# Patient Record
Sex: FEMALE | Race: BLACK OR AFRICAN AMERICAN
[De-identification: names, ages, dates, MRNs, and addresses within clinical notes are randomized per-mention and may not be internally consistent; named-entity substitution may affect disease eponyms.]

---

## 2019-06-21 ENCOUNTER — HOSPITAL ENCOUNTER (EMERGENCY)
Dept: HOSPITAL 97 - ER | Age: 24
LOS: 1 days | Discharge: HOME | End: 2019-06-22
Payer: SELF-PAY

## 2019-06-21 DIAGNOSIS — H10.9: ICD-10-CM

## 2019-06-21 DIAGNOSIS — J06.9: Primary | ICD-10-CM

## 2019-06-21 DIAGNOSIS — Z88.0: ICD-10-CM

## 2019-06-21 DIAGNOSIS — E86.9: ICD-10-CM

## 2019-06-21 DIAGNOSIS — D64.9: ICD-10-CM

## 2019-06-21 LAB — UA COMPLETE W REFLEX CULTURE PNL UR: (no result)

## 2019-06-21 PROCEDURE — 87804 INFLUENZA ASSAY W/OPTIC: CPT

## 2019-06-21 PROCEDURE — 96372 THER/PROPH/DIAG INJ SC/IM: CPT

## 2019-06-21 PROCEDURE — 81015 MICROSCOPIC EXAM OF URINE: CPT

## 2019-06-21 PROCEDURE — 81003 URINALYSIS AUTO W/O SCOPE: CPT

## 2019-06-21 PROCEDURE — 87086 URINE CULTURE/COLONY COUNT: CPT

## 2019-06-21 PROCEDURE — 81025 URINE PREGNANCY TEST: CPT

## 2019-06-21 PROCEDURE — 96374 THER/PROPH/DIAG INJ IV PUSH: CPT

## 2019-06-21 PROCEDURE — 85025 COMPLETE CBC W/AUTO DIFF WBC: CPT

## 2019-06-21 PROCEDURE — 96361 HYDRATE IV INFUSION ADD-ON: CPT

## 2019-06-21 PROCEDURE — 36415 COLL VENOUS BLD VENIPUNCTURE: CPT

## 2019-06-21 PROCEDURE — 87081 CULTURE SCREEN ONLY: CPT

## 2019-06-21 PROCEDURE — 99283 EMERGENCY DEPT VISIT LOW MDM: CPT

## 2019-06-21 PROCEDURE — 87088 URINE BACTERIA CULTURE: CPT

## 2019-06-21 PROCEDURE — 87070 CULTURE OTHR SPECIMN AEROBIC: CPT

## 2019-06-21 PROCEDURE — 80048 BASIC METABOLIC PNL TOTAL CA: CPT

## 2019-06-21 NOTE — XMS REPORT
Clinical Summary

 Created on:2019



Patient:Jayde Mcduffie

Sex:Female

:1995

External Reference #:[id number]





Demographics







 Address  [address] [place] [address]



   [address] [place] [address]

 

 Home Phone  [phone]

 

 Mobile Phone  [phone]

 

 Home Phone  [phone]

 

 Email Address  [email or web address]

 

 Preferred Language  English

 

 Marital Status  Single

 

 Worship Affiliation  Unknown

 

 Race  Black or 

 

 Ethnic Group  Not  or 









Author







 Organization  [facility]

 

 Address  [address] [place] [address]









Support







 Name  Relationship  Address  Phone

 

 Patt Peng  Unavailable  [address] [place] [address]  [phone]



     [address] [place] [address]  









Care Team Providers







 Name  Role  Phone

 

 Asked, No Pcp  Primary Care Provider  Unavailable









Allergies







 Active Allergy  Reactions  Severity  Noted Date  Comments

 

 Penicillins  Shortness Of Breath  High  2018  







Medications







 Medication  Sig  Dispensed  Refills  Start Date  End Date  Status

 

 acetaminophen-codein  Take 1 tablet by  12 tablet  0  2018  




 e (TYLENOL WITH  mouth every 6          



 CODEINE #3) 300-30  (six) hours as          



 mg per tablet  needed for          



   moderate pain for          



   up to 12 doses.          

 

 acetaminophen-codein  Take 1 tablet by  12 tablet  0  2018  




 e (TYLENOL WITH  mouth every 6          



 CODEINE #3) 300-30  (six) hours as          



 mg per tablet  needed for          



   moderate pain for          



   up to 12 doses.          







Active Problems

Not on file



Encounters







 Date  Type  Specialty  Care Team  Description

 

 2018  Emergency  Emergency Medicine  Venancio Damon,  Sprain of 
medial



       MD  collateral ligament of



         right knee, initial



         encounter (Primary Dx)



after 2018



Social History







 Tobacco Use  Types  Packs/Day  Years Used  Date

 

 Never Smoker        









 Smokeless Tobacco: Never Used      









 Alcohol Use  Drinks/Week  oz/Week  Comments

 

 Yes      Occasionally









 Sex Assigned at Birth  Date Recorded

 

 Not on file  









 Job Start Date  Occupation  Industry

 

 Not on file  Not on file  Not on file









 Travel History  Travel Start  Travel End









 No recent travel history available.







Last Filed Vital Signs







 Vital Sign  Reading  Time Taken

 

 Blood Pressure  125/68  2018 10:13 PM CDT

 

 Pulse  101  2018 10:13 PM CDT

 

 Temperature  37.1 C (98.7 F)  2018 10:13 PM CDT

 

 Respiratory Rate  18  2018 10:13 PM CDT

 

 Oxygen Saturation  99%  2018 10:13 PM CDT

 

 Inhaled Oxygen Concentration  -  -

 

 Weight  -  -

 

 Height  149.9 cm (4' 11")  2018  6:05 PM CDT

 

 Body Mass Index  -  -







Plan of Treatment







 Health Maintenance  Due Date  Last Done  Comments

 

 INFLUENZA VACCINE  2019    







Procedures







 Procedure Name  Priority  Date/Time  Associated  Comments



       Diagnosis  

 

 SPLINT APPLICATION  Routine  2018  9:40    Results for this



     PM CDT    procedure are in



         the results



         section.

 

 XR KNEE 4+ VW RIGHT  STAT  2018  6:51    Results for this



     PM CDT    procedure are in



         the results



         section.

 

 HCG QUALITATIVE,  Routine  2018  6:20    Results for this



 URINE SCREEN    PM CDT    procedure are in



         the results



         section.



after 2018



Results

SPLINT APPLICATION (2018  9:40 PM CDT)





 Narrative  Performed At

 

 Venancio Damon MD 20187:40 PM



  



 Splint Application



  



 Performed by: NEO BAUTISTA



  



 Authorized by: VENANCIO DAMON 



  



  



  



 Consent: 



  



 Consent obtained:Verbal



  



 Consent given by:Patient



  



 Risks discussed:Pain



  



 Alternatives discussed:No treatment



  



 Pre-procedure details: 



  



 Sensation:Normal



  



 Procedure details: 



  



 Laterality:Right



  



 Location:Knee



  



 Knee:R knee



  



 Splint type:Knee immobilizer



  



 Supplies:Prefabricated splint



  



 Post-procedure details: 



  



 Pain:Unchanged



  



 Sensation:Normal



  



 Patient tolerance of procedure:Tolerated well, no immediate 



  



 complications  



XR Knee 4+ Vw Right (2018  6:51 PM CDT)





 Specimen

 

 









 Narrative  Performed At

 

 EXAMINATION:XR KNEE 4VW RIGHT



  [facility]



  



  



 CLINICAL HISTORY:knee pain



  



  



  



 COMPARISON:None.



  



  



  



 FINDINGS:



  



  



  



 The bones appear intact. There is narrowing the medial and  



 patellofemoral compartments. There are no fractures visualized. There is  



 no fluid noted about the joint.



  



  



  



 IMPRESSION:



  



  



  



 There is narrowing the medial and patellofemoral joint compartments no  



 acute finding is visualized.



  



  



  



 [id number]



  



   









 Procedure Note

 

 Hm Interface, Radiology Results Incoming - 2018  7:00 PM CDT



EXAMINATION:  XR KNEE 4  VW RIGHT



 



 CLINICAL HISTORY:  knee pain



 



 COMPARISON:  None.



 



 FINDINGS:



 



 The bones appear intact. There is narrowing the medial and patellofemoral 
compartments. There are no fractures visualized. There is no fluid noted about 
the joint.



 



 IMPRESSION:



 



 There is narrowing the medial and patellofemoral joint compartments no acute 
finding is visualized.



 



 [id number]









 Performing Organization  Address  City/State/Zipcode  Phone Number

 

 NATALI KENNEDY  [address] [place] [address]  



hCG qualitative, urine screen (2018  6:20 PM CDT)





 Component  Value  Ref Range  Performed At  Pathologist



         Signature

 

 hCG qualitative,  NegativeComment:    [facility] DEPARTMENT OF  



 urine  Sensitivity of HCG    PATHOLOGY AND  



   test: 25 mIU/mL    GENOMIC MEDICINE  









 Specimen

 

 Urine









 Performing Organization  Address  City[facility]  Phone Number

 

 [facility] DEPARTMENT OF PATHOLOGY AND  [address] [place] [address]  



 GENOMIC MEDICINE      



after 2018



Insurance







 Payer  Benefit Plan /  Subscriber ID  Effective Dates  Phone  Address  Type



   Group          

 

 CVCP  CVCP BCBS  xxxxxxxxxxxx  2016-Present     [address] [facility], [address]



  



           [place] [address]  









 Guarantor Name  Account Type  Relation to  Date of Birth  Phone  Billing



     Patient      Address

 

 Jayde Mcduffie  Personal/Family  Self  1995  [phone]  [address] [place] [address]



         (Home)  [address] [place] [address] [address] [place] [address]



           [id number]







Advance Directives

Patient has advance care planning documents on file. For more information, 
please contact:[address] [place] [address]

## 2019-06-22 LAB
BUN BLD-MCNC: 11 MG/DL (ref 7–18)
GLUCOSE SERPLBLD-MCNC: 116 MG/DL (ref 74–106)
HCT VFR BLD CALC: 34.2 % (ref 36–45)
LYMPHOCYTES # SPEC AUTO: 1.3 K/UL (ref 0.7–4.9)
PMV BLD: 8.4 FL (ref 7.6–11.3)
POTASSIUM SERPL-SCNC: 3.6 MMOL/L (ref 3.5–5.1)
RBC # BLD: 3.69 M/UL (ref 3.86–4.86)

## 2019-06-22 NOTE — ER
Nurse's Notes                                                                                     

 [facility]                                                                 

Name: Jayde Mcduffie                                                                               

Age: 24 yrs                                                                                       

Sex: Female                                                                                       

: 1995                                                                                   

MRN: [id number]                                                                                   

Arrival Date: 2019                                                                          

Time: 22:22                                                                                       

Account#: [id number]                                                                            

Bed 16                                                                                            

Private MD:                                                                                       

Diagnosis: Acute upper respiratory infection, unspecified;Conjunctivitis;Volume depletion         

                                                                                                  

Presentation:                                                                                     

                                                                                             

22:29 Presenting complaint:  states: "She has had a headache for 3 days as well as     jd3 

      pink eye. today she started having cold sweats and fever off and on as well as a            

      worsening headache and sinus pressure.". Transition of care: patient was not received       

      from another setting of care. Onset of symptoms was 2019. Risk Assessment: Do      

      you want to hurt yourself or someone else? Patient reports no desire to harm self or        

      others. Initial Sepsis Screen: Does the patient meet any 2 criteria? HR > 90 bpm. No.       

      Patient's initial sepsis screen is negative. Does the patient have a suspected source       

      of infection? No. Patient's initial sepsis screen is negative. Care prior to arrival:       

      None.                                                                                       

22:29 Method Of Arrival: Ambulatory                                                           [facility] 

22:29 Acuity: ADRIAN 3                                                                           jd3 

                                                                                                  

Triage Assessment:                                                                                

                                                                                             

01:56 Headache History: The patient has had previous headaches and this one is different than jd3 

      previous episodes, and this one is more severe than previous episodes.                      

01:57 Pain: Pain began gradually, Also complains of no other associated symptoms.             jd3 

                                                                                                  

OB/GYN:                                                                                           

01:58 LMP N/A - Irregular menses                                                              jd3 

                                                                                                  

Historical:                                                                                       

- Allergies:                                                                                      

                                                                                             

22:33 PENICILLINS;                                                                            jd3 

- Home Meds:                                                                                      

22:33 Prednisone Oral [Active]; Ferrex 150 oral oral [Active]; Omeprazole Oral [Active];      jd3 

- PMHx:                                                                                           

22:33 Lupus; Anemia;                                                                          jd3 

- PSHx:                                                                                           

22:33 kidney; elbow; heart;                                                                   jd3 

                                                                                                  

- Immunization history:: Adult Immunizations up to date.                                          

- Social history:: Smoking status: Patient/guardian denies using tobacco.                         

- Ebola Screening: : Patient negative for fever greater than or equal to 101.5 degrees            

  Fahrenheit, and additional compatible Ebola Virus Disease symptoms.                             

                                                                                                  

                                                                                                  

Screenin/22                                                                                             

01:56 Abuse screen: Denies threats or abuse. Nutritional screening: No deficits noted.        jd3 

      Tuberculosis screening: No symptoms or risk factors identified. Fall Risk Ambulatory        

      Aid- None/Bed Rest/Nurse Assist (0 pts). Gait- Normal/Bed Rest/Wheelchair (0 pts)           

      Mental Status- Oriented to own ability (0 pts). Total Porter Fall Scale indicates No         

      Risk (0-24 pts).                                                                            

                                                                                                  

Assessment:                                                                                       

                                                                                             

22:30 General: Appears in no apparent distress. uncomfortable, Behavior is calm, cooperative, jb4 

      appropriate for age. Pain: Complains of pain in left eye, headache, face Pain does not      

      radiate. Pain currently is 8 out of 10 on a pain scale. Quality of pain is described as     

      stabbing, throbbing. Neuro: Level of Consciousness is awake, alert, obeys commands,         

      Oriented to person, place, time, situation. Cardiovascular: Patient's skin is warm and      

      dry. Respiratory: Airway is patent Respiratory effort is even, unlabored, Respiratory       

      pattern is regular, symmetrical. GI: No deficits noted. : No deficits noted. EENT:        

      Sclera/Cornea are reddened in outer aspect of conjuctiva of left eye, iris of left eye      

      and inner aspect of conjunctiva of left eye. Derm: Skin is intact, Skin is pink, warm \T\   

      dry. Musculoskeletal: Circulation, motion, and sensation intact.                            

23:33 Reassessment: Patient appears in no apparent distress at this time. Patient and/or      jb4 

      family updated on plan of care and expected duration. Pain level reassessed. Patient is     

      alert, oriented x 3, equal unlabored respirations, skin warm/dry/pink.                      

                                                                                             

01:00 Reassessment: Patient appears in no apparent distress at this time. Patient and/or      jb4 

      family updated on plan of care and expected duration. Pain level reassessed. Patient is     

      alert, oriented x 3, equal unlabored respirations, skin warm/dry/pink. Patient states       

      feeling better.                                                                             

01:55 Reassessment: Patient appears in no apparent distress at this time. Patient and/or      jd3 

      family updated on plan of care and expected duration. Pain level reassessed. Patient is     

      alert, oriented x 3, equal unlabored respirations, skin warm/dry/pink. Patient states       

      feeling better.                                                                             

                                                                                                  

Vital Signs:                                                                                      

                                                                                             

22:33  / 89; Pulse 113; Resp 17 S; Temp 98.4(O); Pulse Ox 98% on R/A; Weight 65.77 kg   jd3 

      (R); Height 4 ft. 11 in. (149.86 cm) (R); Pain 10/10;                                       

23:33  / 77; Pulse 110; Resp 16; Pulse Ox 99% on R/A;                                   jb4 

                                                                                             

01:00  / 70; Pulse 89; Resp 18; Pulse Ox 100% on R/A;                                   jb4 

                                                                                             

22:33 Body Mass Index 29.29 (65.77 kg, 149.86 cm)                                             j 

                                                                                                  

ED Course:                                                                                        

                                                                                             

22:22 Patient arrived in ED.                                                                  es  

22:23 Humberto Villalobos, RN is Primary Nurse.                                                     jb4 

22:28 Jeannine Madsen FNP-C is PHCP.                                                        snw 

22:28 Inocente Palumbo MD is Attending Physician.                                           snw 

22:31 Triage completed.                                                                       jd3 

22:33 Arm band placed on.                                                                     jd3 

                                                                                             

01:56 Patient has correct armband on for positive identification. Bed in low position. Call   j 

      light in reach. Side rails up X 1. Adult w/ patient.                                        

01:57 No provider procedures requiring assistance completed. IV discontinued, intact,         jd3 

      bleeding controlled, No redness/swelling at site. Pressure dressing applied.                

                                                                                                  

Administered Medications:                                                                         

                                                                                             

23:05 Not Given (other intervention used): Tobramycin Drops (0.3 %) 2 drops Ophthalmic once;  snw 

      OS                                                                                          

23:10 Not Given (patient request/): Norco 5 mg-325 mg 1 tabs PO once                          snw 

23:10 Drug: Phenergan 25 mg Route: PO;                                                        jb4 

                                                                                             

00:10 Follow up: Response: No adverse reaction                                                jd3 

                                                                                             

23:10 Drug: Gentamicin Drops 0.3 % 2 drops Route: Ophthalmic; Site: left eye;                 jb4 

                                                                                             

00:10 Follow up: Response: No adverse reaction                                                jd3 

                                                                                             

23:24 Drug: TORadol 30 mg Route: IM; Site: left gluteus;                                      jb4 

                                                                                             

00:20 Follow up: Response: No adverse reaction                                                jd3 

00:25 Drug: NS 0.9% 1000 ml Route: IV; Rate: 1 bolus; Site: left antecubital;                 jb4 

01:58 Follow up: Response: No adverse reaction; IV Status: Completed infusion; IV Intake:     jd3 

      1000ml                                                                                      

01:06 Drug: Benadryl 12.5 mg Route: IVP; Site: left antecubital;                              jb4 

01:58 Follow up: Response: No adverse reaction                                                jd3 

                                                                                                  

                                                                                                  

Intake:                                                                                           

01:58 IV: 1000ml; Total: 1000ml.                                                              jd3 

                                                                                                  

Outcome:                                                                                          

01:21 Discharge ordered by MD.                                                                delaney 

01:57 Discharged to home ambulatory, with family.                                             jd3 

01:57 Condition: stable                                                                           

01:57 Discharge instructions given to patient, family, Instructed on discharge instructions,      

      follow up and referral plans. medication usage, Demonstrated understanding of               

      instructions, follow-up care, medications, Prescriptions given X 2.                         

02:00 Patient left the ED.                                                                    jd3 

                                                                                                  

Signatures:                                                                                       

Jeannine Madsen, FNP-C                 FNP-CsnJanina Hunt James, RN                       RN   jb4                                                  

Cosme Potter RN                    RN   jd3                                                  

                                                                                                  

**************************************************************************************************

## 2019-06-22 NOTE — EDPHYS
Physician Documentation                                                                           

 [facility]                                                                 

Name: Jayde Mcduffie                                                                               

Age: 24 yrs                                                                                       

Sex: Female                                                                                       

: 1995                                                                                   

MRN: [id number]                                                                                   

Arrival Date: 2019                                                                          

Time: 22:22                                                                                       

Account#: [id number]                                                                            

Bed 16                                                                                            

Private MD:                                                                                       

ED Physician Inocente Palumbo                                                                    

HPI:                                                                                              

                                                                                             

23:06 This 24 yrs old Black Female presents to ER via Ambulatory with complaints of Nasal     snw 

      Congestion, Headache, Difficulty Swallowing.                                                

23:06 The patient or guardian reports cough, flu symptoms, low-grade fever, myalgias, no      snw 

      appetite. Onset: The symptoms/episode began/occurred suddenly, 3 day(s) ago, and became     

      persistent. Severity of symptoms: At their worst the symptoms were moderate. Associated     

      signs and symptoms: Pertinent positives: fever, rhinorrhea, sore throat. It is unknown      

      whether or not the patient has had similar symptoms in the past. It is unknown whether      

      or not the patient has recently seen a physician. taking dayquil/nyquil and using neti      

      pot.                                                                                        

                                                                                                  

OB/GYN:                                                                                           

                                                                                             

01:58 LMP N/A - Irregular menses                                                              jd3 

                                                                                                  

Historical:                                                                                       

- Allergies:                                                                                      

                                                                                             

22:33 PENICILLINS;                                                                            jd3 

- Home Meds:                                                                                      

22:33 Prednisone Oral [Active]; Ferrex 150 oral oral [Active]; Omeprazole Oral [Active];      jd3 

- PMHx:                                                                                           

22:33 Lupus; Anemia;                                                                          jd3 

- PSHx:                                                                                           

22:33 kidney; elbow; heart;                                                                   jd3 

                                                                                                  

- Immunization history:: Adult Immunizations up to date.                                          

- Social history:: Smoking status: Patient/guardian denies using tobacco.                         

- Ebola Screening: : Patient negative for fever greater than or equal to 101.5 degrees            

  Fahrenheit, and additional compatible Ebola Virus Disease symptoms.                             

                                                                                                  

                                                                                                  

ROS:                                                                                              

23:05 ENT: Negative for injury, pain, and discharge, Neck: Negative for injury, pain, and     snw 

      swelling, Cardiovascular: Negative for chest pain, palpitations, and edema,                 

      Respiratory: Negative for shortness of breath, cough, wheezing, and pleuritic chest         

      pain, Abdomen/GI: Negative for abdominal pain, nausea, vomiting, diarrhea, and              

      constipation, Back: Negative for injury and pain, : Negative for injury, bleeding,        

      discharge, and swelling, MS/Extremity: Negative for injury and deformity, Skin:             

      Negative for injury, rash, and discoloration.                                               

23:05 Constitutional: Positive for body aches, fatigue, fever, malaise.                           

23:05 Eyes: Positive for discharge, matting, redness, of the outer aspect of conjuctiva of        

      left eye and inner aspect of conjunctiva of left eye.                                       

23:05 Neuro: Positive for headache, pressure around top of head.                                  

                                                                                                  

Exam:                                                                                             

23:03 Constitutional:  This is a well developed, well nourished patient who is awake, alert,  snw 

      and in no acute distress. Head/Face:  Normocephalic, atraumatic.                            

23:03 ENT:  Nares patent. No nasal discharge, no septal abnormalities noted.  Tympanic            

      membranes are normal and external auditory canals are clear.  Oropharynx with no            

      redness, swelling, or masses, exudates, or evidence of obstruction, uvula midline.          

      Mucous membranes moist. Neck:  Trachea midline, no thyromegaly or masses palpated, and      

      no cervical lymphadenopathy.  Supple, full range of motion without nuchal rigidity, or      

      vertebral point tenderness.  No Meningismus. Chest/axilla:  Normal chest wall               

      appearance and motion.  Nontender with no deformity.  No lesions are appreciated.           

23:03 Respiratory:  Lungs have equal breath sounds bilaterally, clear to auscultation and         

      percussion.  No rales, rhonchi or wheezes noted.  No increased work of breathing, no        

      retractions or nasal flaring. Abdomen/GI:  Soft, non-tender, with normal bowel sounds.      

      No distension or tympany.  No guarding or rebound.  No evidence of tenderness               

      throughout. Back:  No spinal tenderness.  No costovertebral tenderness.  Full range of      

      motion. Skin:  Warm, dry with normal turgor.  Normal color with no rashes, no lesions,      

      and no evidence of cellulitis. MS/ Extremity:  Pulses equal, no cyanosis.                   

      Neurovascular intact.  Full, normal range of motion.                                        

23:03 Eyes: Periorbital structures: appear normal, Pupils: no acute changes, Extraocular          

      movements: no acute changes, Conjunctiva: exudate, in the left eye, injected, in the        

      left eye, Corneas: are normal, Sclera: no appreciated abnormality.                          

23:03 Cardiovascular: Rate: tachycardic, Rhythm: regular.                                         

23:03 Neuro: Orientation: appropriate for stated age, Mentation: is normal, Sensation: is         

      normal, seizure activity, is not displayed by the patient.                                  

                                                                                                  

Vital Signs:                                                                                      

22:33  / 89; Pulse 113; Resp 17 S; Temp 98.4(O); Pulse Ox 98% on R/A; Weight 65.77 kg   jd3 

      (R); Height 4 ft. 11 in. (149.86 cm) (R); Pain 10/10;                                       

23:33  / 77; Pulse 110; Resp 16; Pulse Ox 99% on R/A;                                   jb4 

                                                                                             

01:00  / 70; Pulse 89; Resp 18; Pulse Ox 100% on R/A;                                   jb4 

                                                                                             

22:33 Body Mass Index 29.29 (65.77 kg, 149.86 cm)                                             jd3 

                                                                                                  

MDM:                                                                                              

                                                                                             

22:29 Patient medically screened.                                                             w 

                                                                                             

01:25 Data reviewed: vital signs, nurses notes, lab test result(s). Data interpreted: Pulse   snw 

      oximetry: on room air is 100 %. Interpretation: normal. Counseling: I had a detailed        

      discussion with the patient and/or guardian regarding: the historical points, exam          

      findings, and any diagnostic results supporting the discharge/admit diagnosis, lab          

      results, the need for outpatient follow up, to return to the emergency department if        

      symptoms worsen or persist or if there are any questions or concerns that arise at          

      home. Response to treatment: the patient's symptoms have markedly improved after            

      treatment. Special discussion: Based on the history and exam findings, there is no          

      indication for further emergent testing or inpatient evaluation. I discussed with the       

      patient/guardian the need to see the primary care provider for further evaluation of        

      the symptoms.                                                                               

                                                                                                  

                                                                                             

22:52 Order name: Flu; Complete Time: 23:57                                                   snw 

                                                                                             

22:52 Order name: Strep; Complete Time: 23:57                                                 snw 

                                                                                             

22:52 Order name: Urine Culture                                                               snw 

                                                                                             

22:52 Order name: Urine Microscopic Only; Complete Time: 00:29                                snw 

                                                                                             

23:21 Order name: Urine Dipstick--Ancillary (enter results); Complete Time: 23:57             ar5 

                                                                                             

23:21 Order name: Urine Pregnancy--Ancillary (enter results); Complete Time: 23:57            ar5 

                                                                                             

23:53 Order name: Throat Culture                                                              EDMS

                                                                                             

00:03 Order name: CBC with Diff; Complete Time: 00:44                                         jb4 

                                                                                             

00:03 Order name: Basic Metabolic Panel; Complete Time: 00:44                                 4 

                                                                                             

22:52 Order name: Urine Pregnancy Test (obtain specimen); Complete Time: 23:29                snw 

                                                                                             

22:52 Order name: Urine Dipstick-Ancillary (obtain specimen); Complete Time: 23:29            snw 

                                                                                             

00:03 Order name: IV Saline Lock; Complete Time: 00:27                                        4 

                                                                                             

00:03 Order name: Labs collected and sent; Complete Time: 00:25                               [facility] 

                                                                                                  

Administered Medications:                                                                         

                                                                                             

23:05 Not Given (other intervention used): Tobramycin Drops (0.3 %) 2 drops Ophthalmic once;  snw 

      OS                                                                                          

23:10 Not Given (patient request/): Norco 5 mg-325 mg 1 tabs PO once                          snw 

23:10 Drug: Phenergan 25 mg Route: PO;                                                        [facility] 

                                                                                             

00:10 Follow up: Response: No adverse reaction                                                [facility] 

                                                                                             

23:10 Drug: Gentamicin Drops 0.3 % 2 drops Route: Ophthalmic; Site: left eye;                 [facility] 

                                                                                             

00:10 Follow up: Response: No adverse reaction                                                [facility] 

                                                                                             

23:24 Drug: TORadol 30 mg Route: IM; Site: left gluteus;                                      [facility] 

                                                                                             

00:20 Follow up: Response: No adverse reaction                                                j 

00:25 Drug: NS 0.9% 1000 ml Route: IV; Rate: 1 bolus; Site: left antecubital;                 4 

01:58 Follow up: Response: No adverse reaction; IV Status: Completed infusion; IV Intake:     jd3 

      1000ml                                                                                      

01:06 Drug: Benadryl 12.5 mg Route: IVP; Site: left antecubital;                              4 

01:58 Follow up: Response: No adverse reaction                                                jd3 

                                                                                                  

                                                                                                  

Disposition:                                                                                      

02:59 Co-signature as Attending Physician, Inocente Palumbo MD.                               ma2 

                                                                                                  

Disposition:                                                                                      

19 01:21 Discharged to Home. Impression: Acute upper respiratory infection, unspecified,    

  Conjunctivitis, Volume depletion.                                                               

- Condition is Stable.                                                                            

- Discharge Instructions: Bacterial Conjunctivitis, Upper Respiratory Infection, Adult,           

  Cool Mist Vaporizer, Rehydration, Adult.                                                        

- Prescriptions for Nasonex 50 mcg/actuation Nasal spray,non- aerosol - spray 2 spray             

  by INTRANASAL route once daily; 1 Cartridge. Polytrim 10,000 unit- 1 mg/mL Ophthalmic           

  drops - instill 1 drop by OPHTHALMIC route every 4 hours; 1 bottle.                             

- Work release form, Medication Reconciliation Form, Thank You Letter, Antibiotic                 

  Education, Prescription Opioid Use form.                                                        

- Follow up: Private Physician; When: 2 - 3 days; Reason: Recheck today's complaints,             

  Continuance of care, Re-evaluation by your physician. Follow up: Emergency                      

  Department; When: As needed; Reason: Worsening of condition.                                    

                                                                                                  

                                                                                                  

                                                                                                  

Signatures:                                                                                       

Dispatcher [employer]                           EDMS                                                 

Jeannine Madsen, GRETEL-C                 FNP-Csnw                                                  

Humberto Villalobos, RN                       RN   jb4                                                  

Cosme Potter RN                    RN   jd3                                                  

Inocente Palumbo MD MD   ma2                                                  

                                                                                                  

Corrections: (The following items were deleted from the chart)                                    

02:00 01:21 2019 01:21 Discharged to Home. Impression: Acute upper respiratory          jd3 

      infection, unspecified; Conjunctivitis; Volume depletion. Condition is Stable. Forms        

      are Medication Reconciliation Form, Thank You Letter, Antibiotic Education,                 

      Prescription Opioid Use. Follow up: Private Physician; When: 2 - 3 days; Reason:            

      Recheck today's complaints, Continuance of care, Re-evaluation by your physician.           

      Follow up: Emergency Department; When: As needed; Reason: Worsening of condition. snw       

                                                                                                  

**************************************************************************************************

## 2020-07-20 ENCOUNTER — HOSPITAL ENCOUNTER (INPATIENT)
Dept: HOSPITAL 97 - ER | Age: 25
LOS: 9 days | Discharge: HOME | DRG: 683 | End: 2020-07-29
Attending: FAMILY MEDICINE | Admitting: FAMILY MEDICINE
Payer: SELF-PAY

## 2020-07-20 VITALS — BODY MASS INDEX: 26.9 KG/M2

## 2020-07-20 DIAGNOSIS — E83.51: ICD-10-CM

## 2020-07-20 DIAGNOSIS — E87.2: ICD-10-CM

## 2020-07-20 DIAGNOSIS — N18.6: ICD-10-CM

## 2020-07-20 DIAGNOSIS — Z20.828: ICD-10-CM

## 2020-07-20 DIAGNOSIS — M32.9: ICD-10-CM

## 2020-07-20 DIAGNOSIS — N17.9: Primary | ICD-10-CM

## 2020-07-20 DIAGNOSIS — M32.14: ICD-10-CM

## 2020-07-20 DIAGNOSIS — N25.81: ICD-10-CM

## 2020-07-20 DIAGNOSIS — R41.82: ICD-10-CM

## 2020-07-20 DIAGNOSIS — D63.1: ICD-10-CM

## 2020-07-20 DIAGNOSIS — R05: ICD-10-CM

## 2020-07-20 DIAGNOSIS — Z88.0: ICD-10-CM

## 2020-07-20 DIAGNOSIS — D69.6: ICD-10-CM

## 2020-07-20 DIAGNOSIS — R56.9: ICD-10-CM

## 2020-07-20 DIAGNOSIS — D63.8: ICD-10-CM

## 2020-07-20 DIAGNOSIS — I12.0: ICD-10-CM

## 2020-07-20 DIAGNOSIS — T78.3XXA: ICD-10-CM

## 2020-07-20 DIAGNOSIS — R74.0: ICD-10-CM

## 2020-07-20 DIAGNOSIS — R06.02: ICD-10-CM

## 2020-07-20 LAB
ALBUMIN SERPL BCP-MCNC: 2.7 G/DL (ref 3.4–5)
ALP SERPL-CCNC: 57 U/L (ref 45–117)
ALT SERPL W P-5'-P-CCNC: 11 U/L (ref 12–78)
ANISOCYTOSIS BLD QL: (no result)
AST SERPL W P-5'-P-CCNC: 15 U/L (ref 15–37)
BUN BLD-MCNC: 79 MG/DL (ref 7–18)
GLUCOSE SERPLBLD-MCNC: 99 MG/DL (ref 74–106)
HCT VFR BLD CALC: 9 % (ref 36–45)
INR BLD: 0.97
LYMPHOCYTES # SPEC AUTO: 1.5 K/UL (ref 0.7–4.9)
MAGNESIUM SERPL-MCNC: 1.7 MG/DL (ref 1.8–2.4)
MORPHOLOGY BLD-IMP: (no result)
NT-PROBNP SERPL-MCNC: 8398 PG/ML (ref ?–125)
PMV BLD: 8.2 FL (ref 7.6–11.3)
POLYCHROMASIA BLD QL SMEAR: (no result)
POTASSIUM SERPL-SCNC: 4.3 MMOL/L (ref 3.5–5.1)
RBC # BLD: < 1.05 M/UL (ref 3.86–4.86)
TROPONIN (EMERG DEPT USE ONLY): < 0.02 NG/ML (ref 0–0.04)
TSH SERPL DL<=0.05 MIU/L-ACNC: 2.19 UIU/ML (ref 0.36–3.74)
UA COMPLETE W REFLEX CULTURE PNL UR: (no result)
URATE SERPL-MCNC: 11.4 MG/DL (ref 2.6–6)

## 2020-07-20 PROCEDURE — 70551 MRI BRAIN STEM W/O DYE: CPT

## 2020-07-20 PROCEDURE — 83880 ASSAY OF NATRIURETIC PEPTIDE: CPT

## 2020-07-20 PROCEDURE — 86706 HEP B SURFACE ANTIBODY: CPT

## 2020-07-20 PROCEDURE — 95816 EEG AWAKE AND DROWSY: CPT

## 2020-07-20 PROCEDURE — 86850 RBC ANTIBODY SCREEN: CPT

## 2020-07-20 PROCEDURE — 87081 CULTURE SCREEN ONLY: CPT

## 2020-07-20 PROCEDURE — 81015 MICROSCOPIC EXAM OF URINE: CPT

## 2020-07-20 PROCEDURE — 71045 X-RAY EXAM CHEST 1 VIEW: CPT

## 2020-07-20 PROCEDURE — 93306 TTE W/DOPPLER COMPLETE: CPT

## 2020-07-20 PROCEDURE — 87389 HIV-1 AG W/HIV-1&-2 AB AG IA: CPT

## 2020-07-20 PROCEDURE — 81003 URINALYSIS AUTO W/O SCOPE: CPT

## 2020-07-20 PROCEDURE — 84156 ASSAY OF PROTEIN URINE: CPT

## 2020-07-20 PROCEDURE — 82805 BLOOD GASES W/O2 SATURATION: CPT

## 2020-07-20 PROCEDURE — 86038 ANTINUCLEAR ANTIBODIES: CPT

## 2020-07-20 PROCEDURE — 83605 ASSAY OF LACTIC ACID: CPT

## 2020-07-20 PROCEDURE — 82746 ASSAY OF FOLIC ACID SERUM: CPT

## 2020-07-20 PROCEDURE — 86225 DNA ANTIBODY NATIVE: CPT

## 2020-07-20 PROCEDURE — 86021 WBC ANTIBODY IDENTIFICATION: CPT

## 2020-07-20 PROCEDURE — 94760 N-INVAS EAR/PLS OXIMETRY 1: CPT

## 2020-07-20 PROCEDURE — 30233N1 TRANSFUSION OF NONAUTOLOGOUS RED BLOOD CELLS INTO PERIPHERAL VEIN, PERCUTANEOUS APPROACH: ICD-10-PCS

## 2020-07-20 PROCEDURE — 85610 PROTHROMBIN TIME: CPT

## 2020-07-20 PROCEDURE — 84165 PROTEIN E-PHORESIS SERUM: CPT

## 2020-07-20 PROCEDURE — 83615 LACTATE (LD) (LDH) ENZYME: CPT

## 2020-07-20 PROCEDURE — 84439 ASSAY OF FREE THYROXINE: CPT

## 2020-07-20 PROCEDURE — 86430 RHEUMATOID FACTOR TEST QUAL: CPT

## 2020-07-20 PROCEDURE — 80069 RENAL FUNCTION PANEL: CPT

## 2020-07-20 PROCEDURE — 86900 BLOOD TYPING SEROLOGIC ABO: CPT

## 2020-07-20 PROCEDURE — 82550 ASSAY OF CK (CPK): CPT

## 2020-07-20 PROCEDURE — 83970 ASSAY OF PARATHORMONE: CPT

## 2020-07-20 PROCEDURE — 99285 EMERGENCY DEPT VISIT HI MDM: CPT

## 2020-07-20 PROCEDURE — 86922 COMPATIBILITY TEST ANTIGLOB: CPT

## 2020-07-20 PROCEDURE — 80048 BASIC METABOLIC PNL TOTAL CA: CPT

## 2020-07-20 PROCEDURE — 96360 HYDRATION IV INFUSION INIT: CPT

## 2020-07-20 PROCEDURE — 82728 ASSAY OF FERRITIN: CPT

## 2020-07-20 PROCEDURE — 83735 ASSAY OF MAGNESIUM: CPT

## 2020-07-20 PROCEDURE — 86704 HEP B CORE ANTIBODY TOTAL: CPT

## 2020-07-20 PROCEDURE — 36430 TRANSFUSION BLD/BLD COMPNT: CPT

## 2020-07-20 PROCEDURE — 87070 CULTURE OTHR SPECIMN AEROBIC: CPT

## 2020-07-20 PROCEDURE — 76770 US EXAM ABDO BACK WALL COMP: CPT

## 2020-07-20 PROCEDURE — 82607 VITAMIN B-12: CPT

## 2020-07-20 PROCEDURE — 84550 ASSAY OF BLOOD/URIC ACID: CPT

## 2020-07-20 PROCEDURE — 87340 HEPATITIS B SURFACE AG IA: CPT

## 2020-07-20 PROCEDURE — 84100 ASSAY OF PHOSPHORUS: CPT

## 2020-07-20 PROCEDURE — 85025 COMPLETE CBC W/AUTO DIFF WBC: CPT

## 2020-07-20 PROCEDURE — 84484 ASSAY OF TROPONIN QUANT: CPT

## 2020-07-20 PROCEDURE — 86901 BLOOD TYPING SEROLOGIC RH(D): CPT

## 2020-07-20 PROCEDURE — 70450 CT HEAD/BRAIN W/O DYE: CPT

## 2020-07-20 PROCEDURE — 83520 IMMUNOASSAY QUANT NOS NONAB: CPT

## 2020-07-20 PROCEDURE — 83540 ASSAY OF IRON: CPT

## 2020-07-20 PROCEDURE — 86870 RBC ANTIBODY IDENTIFICATION: CPT

## 2020-07-20 PROCEDURE — 82652 VIT D 1 25-DIHYDROXY: CPT

## 2020-07-20 PROCEDURE — 36415 COLL VENOUS BLD VENIPUNCTURE: CPT

## 2020-07-20 PROCEDURE — 87086 URINE CULTURE/COLONY COUNT: CPT

## 2020-07-20 PROCEDURE — 82570 ASSAY OF URINE CREATININE: CPT

## 2020-07-20 PROCEDURE — 82274 ASSAY TEST FOR BLOOD FECAL: CPT

## 2020-07-20 PROCEDURE — 93005 ELECTROCARDIOGRAM TRACING: CPT

## 2020-07-20 PROCEDURE — 76000 FLUOROSCOPY <1 HR PHYS/QHP: CPT

## 2020-07-20 PROCEDURE — 80053 COMPREHEN METABOLIC PANEL: CPT

## 2020-07-20 PROCEDURE — 87088 URINE BACTERIA CULTURE: CPT

## 2020-07-20 PROCEDURE — 86160 COMPLEMENT ANTIGEN: CPT

## 2020-07-20 PROCEDURE — 80076 HEPATIC FUNCTION PANEL: CPT

## 2020-07-20 PROCEDURE — 85044 MANUAL RETICULOCYTE COUNT: CPT

## 2020-07-20 PROCEDURE — 96361 HYDRATE IV INFUSION ADD-ON: CPT

## 2020-07-20 PROCEDURE — 84443 ASSAY THYROID STIM HORMONE: CPT

## 2020-07-20 PROCEDURE — 84466 ASSAY OF TRANSFERRIN: CPT

## 2020-07-20 PROCEDURE — 81025 URINE PREGNANCY TEST: CPT

## 2020-07-20 PROCEDURE — 90935 HEMODIALYSIS ONE EVALUATION: CPT

## 2020-07-20 PROCEDURE — 87522 HEPATITIS C REVRS TRNSCRPJ: CPT

## 2020-07-20 PROCEDURE — 83010 ASSAY OF HAPTOGLOBIN QUANT: CPT

## 2020-07-20 PROCEDURE — 86317 IMMUNOASSAY INFECTIOUS AGENT: CPT

## 2020-07-20 RX ADMIN — HEPARIN SODIUM SCH UNIT: 5000 INJECTION, SOLUTION INTRAVENOUS; SUBCUTANEOUS at 18:43

## 2020-07-20 RX ADMIN — Medication SCH ML: at 21:48

## 2020-07-20 RX ADMIN — SODIUM CHLORIDE SCH: 0.9 INJECTION, SOLUTION INTRAVENOUS at 16:00

## 2020-07-20 NOTE — XMS REPORT
Clinical Summary

                            Created on:2020



Patient:Jayde Mcduffie

Sex:Female

:1995

External Reference #:[id number]





Demographics







                          Address                   [address] 



                                                    [place] [address]

 

                          Home Phone                [phone]

 

                          Work Phone                [phone]

 

                          Mobile Phone              [phone]

 

                          Preferred Language        ENG

 

                          Marital Status            Single

 

                          Islam Affiliation     Unknown

 

                          Race                      Unknown

 

                          Ethnic Group              Unknown









Author







                          Organization              [facility]

ict

 

                          Address                   [facility] [place] [address]









Support







                Name            Relationship    Address         Phone

 

                Patt Peng    Unavailable     [address] [facility] [address]  [phone]



                                                [place] [address] 









Care Team Providers







                    Name                Role                Phone

 

                    Unavailable         Primary Care Provider Unavailable









Allergies







             Active Allergy Reactions    Severity     Noted Date   Comments

 

             Amoxicillin-Pot Clavulanate                           2009   

 

             Penicillins                            2009   







Medications







          Medication Sig       Dispensed Refills   Start Date End Date  Status

 

          hydroxychloroquine Take 200 mg by           0                         

    Active



          (PLAQUENIL) 200 mg tablet mouth daily.                                

         

 

          ferrous sulfate 325 mg Take 325 mg by           0                     

        Active



          (65 mg iron) tablet mouth daily                                       

  



                    (with                                             



                    breakfast).                                         

 

          aspirin (ASPIRIN) 81 mg Chew and            0                         

    Active



          chewable tablet swallow 81 mg                                         



                    by mouth                                          



                    daily.                                            

 

          sertraline (ZOLOFT) 50 mg Take 50 mg by           0                   

          Active



          tablet    mouth daily.                                         

 

          atovaquone (MEPRON) 750 Take 10 mL by 210 mL    0         10/08/2017  

         Active



          mg/5 mL oral suspension mouth daily.                                  

       

 

          folic acid (FOLVITE) 1 mg Take 1 tablet 90 tablet 3         10/08/2017

           Active



          tablet    by mouth                                          



                    daily.                                            

 

          sennosides-docusate Take 1 tablet 30 tablet 3         10/08/2017      

     Active



          sodium (SENNA PLUS) by mouth                                          



          8.6-50 mg tablet daily.                                            

 

          hydroxychloroquine Take 1 tablet 30 tablet 3         10/08/2017       

    Active



          (PLAQUENIL) 200 mg tablet by mouth                                    

      



                    daily.                                            

 

          omeprazole (PRILOSEC) 20 Take 2    60 capsule 3         10/08/2017    

       Active



          mg delayed release capsules by                                        

 



          capsule   mouth daily.                                         

 

          mycophenolate (CELLCEPT) Take 3 tablets 90 tablet 3         10/08/2017

           Active



          500 mg tabletIndications: by mouth 2                                  

       



          Acquired hemolytic times daily.                                       

  



          anemia, Systemic lupus                                                

   



          erythematosus with                                                   



          glomerular disease,                                                   



          unspecified SLE type                                                  

 

 

                          predniSONE (DELTASONE) 20 Take 3 tablets by mouth alma

y Take 60mg (3 tablets) 

daily for 4 weeks 90 tablet    2            10/08/2017                Active



          mg tablet Take 50mg (2.5 tablets) daily for 2 weeks                   

                      



                    Then take 40mg (2 tablets) daily until seen in clinic.      

                                   

 

          ondansetron (ZOFRAN) 4 mg Take 1 tablet 20 tablet 0         10/08/2017

           Active



          tablet    by mouth every                                         



                    8 hours as                                         



                    needed for up                                         



                    to 5 doses for                                         



                    Nausea.                                           







Active Problems







                          Problem                   Noted Date

 

                          Thyroid lesion            10/01/2017

 

                          Lymphadenopathy, cervical 2017

 

                          Anemia                    2017

 

                          Systemic lupus erythematosus with glomerular disease 0

2017

 

                          Cold agglutinin disease   2017

 

                          Menorrhagia with regular cycle 

 

                          Uterine leiomyoma         

 

                          Acquired hemolytic anemia 

 

                          Current use of steroid medication 

 

                          High risk medication use  

 

                          Lupus nephritis           

 

                          Proteinuria               







Family History







                Medical History Relation        Name            Comments

 

                Heart failure   Father                          

 

                Diabetes        Maternal Grandfather                 

 

                Heart failure   Maternal Grandfather                 

 

                Stomach cancer  Maternal Grandmother                 

 

                Cerebral aneurysm Mother                          

 

                Heart failure   Mother                          

 

                Hyperthyroidism Mother                          









                Relation        Name            Status          Comments

 

                Father                                          

 

                Maternal Grandfather                                 

 

                Maternal Grandmother                                 

 

                Mother                                          







Social History







             Tobacco Use  Types        Packs/Day    Years Used   Date

 

             Never Smoker                                        









                Smokeless Tobacco: Never Used                                 









                Alcohol Use     Drinks/Week     oz/Week         Comments

 

                No                                              









                          Sex Assigned at Birth     Date Recorded

 

                          Not on file               









                    Job Start Date      Occupation          Industry

 

                    Not on file         Not on file         Not on file









                    Travel History      Travel Start        Travel End









                                        No recent travel history available.







Last Filed Vital Signs

Not on file



Plan of Treatment







                Health Maintenance Due Date        Last Done       Comments

 

                Cervical Cancer Scrn (3 Yrs) 2015                      

 

                IMM Influenza Seasonal Oct to March (>/= 19 yrs) 10/01/2020     

                 







Results

Not on fileafter 2019



Insurance







          Payer     Benefit Plan / Subscriber ID Effective Dates Phone     Addre

ss   Type



                    Group                                             

 

          [facility]      SELF-PAY  xxxxxxxxx 2017-Prese [phone] [id number] 



           SELF-PAY   UNSCREENED            [address]          [place], [place] 



                                                            [id number]     









           Guarantor Name Account Type Relation to Date of    Phone      Billing



                                 Patient    Birth                 Address

 

           Jayde Mcduffie Personal/Family Self       1995 [phone] [address]









             [address]                                             (Home)



                                        [place], [place]



                                                       [phone]



                                                       (Work)     







Advance Directives







                Code Status     Date Activated  Date Inactivated Comments

 

                Full Code       2017 12:28 PM 10/8/2017  8:31 PM

## 2020-07-20 NOTE — P.HP
Certification for Inpatient


With expected LOS: >2 Midnights


Patient will require the following post-hospital care: None


Practitioner: I am a practitioner with admitting privileges, knowledge of 

patient current condition, hospital course, and medical plan of care.


Services: Services provided to patient in accordance with Admission requirements

found in Title 42 Section 412.3 of the Code of Federal Regulations





<Patrick Stiles - Last Filed: 07/20/20 15:47>





Patient History


Date of Service: 07/20/20


Primary Care Provider: [facility]


Reason for admission: Acute renal failure/lupus/anemia


History of Present Illness: 





25-year-old  female with past medical history of lupus, anemia 

that presents to the emergency room complaining of shortness of breath, sore 

throat, low-grade fever that started approximately a month ago and has 

progressively worsened.  Patient states that she also has been having an inter

mittent cough.  Patient denies any contact with a known Covid positive person.  

Patient states that in 2008 she was diagnosed with lupus.  At that time she was 

also told she had congestive heart failure and underwent a pericardial window.  

She was seeing a rheumatologist in [facility] but moved to [place] several 

years ago and has not followed up with anyone.  States that she has been on 

prednisone 10 mg daily for the last 10-12 years.  She also had a kidney biopsy-

date unknown. 





In the emergency room patient is found to be in acute renal failure and with 

severe anemia with a creatinine level of 9.55, BUN of 79, an H&H of 3.3/9.  Her 

proBNP is 8398.  She is not requiring O2 support at this time.  She is afebrile 

with a temperature reading of 98.6 on arrival to ED.  Vitals were also stable 

with a blood pressure of 117/68, a pulse of 103, respiratory rate of 18 and 

oxygen saturations of 100% on room air.  Her pregnancy test is negative.  Urine 

is negative.





On physical exam patient is calm.  She is in no distress. Is not having cough, 

does not feel febrile and is doing well after some IV fluids and initial 

treatment in the ED.  ED will initiate 2 units of PRBC blood transfusion.  P

atient will be admitted to hospital and further evaluated.


Home medications list reviewed: No





- Past Medical/Surgical History


Diabetic: No


-: Lupus


-: Acute renal failure


-: Congestive heart failure status post pericardial window in 2008


-: Anemia


-: Right kidney biopsy


-: Pericardial window


Psychosocial/ Personal History: .  Lives at home with .  No 

children





- Family History


  ** Mother


-: Heart disease, Kidney disease





- Social History


Smoking Status: Never smoker


Alcohol use: No


CD- Drugs: No


Caffeine use: No


Place of Residence: Home





<RoberthmaximomayurPatrick sapp - Last Filed: 07/20/20 15:47>


Date of Service: 07/20/20





<YiimFreddie Malachi - Last Filed: 07/20/20 16:44>





Review of Systems


General: Fever, Malaise, As per HPI


Eyes: Vision Change (Complaining of decreased visual acuity after watching TV or

computer screen for a few hr.)


ENT: Unremarkable


Respiratory: Cough, Shortness of Breath, SOB with Excertion


Cardiovascular: Unremarkable


Gastrointestinal: Nausea, Vomiting


Genitourinary: Unremarkable


Musculoskeletal: Unremarkable


Integumentary: Unremarkable


Neurological: Weakness





<RoberthmaximoPatrick vera - Last Filed: 07/20/20 15:47>





Physical Examination





- Vital Signs


Temperature: 98.6 F


Blood Pressure: 103/67


Pulse: 100


Respirations: 18


Pulse Ox (%): 98 (RA)





- Physical Exam


General: Alert, In no apparent distress, Oriented x3


HEENT: Atraumatic, Normocephalic, PERRLA


Neck: Supple, No Thyromegaly, Other (Trachea midline)


Respiratory: Clear to auscultation bilaterally, Normal air movement


Cardiovascular: No edema, Normal pulses, Normal S1 S2


Capillary refill: <2 Seconds


Gastrointestinal: Normal bowel sounds, Soft and benign, Non-distended


Musculoskeletal: No clubbing, No swelling, No contractures


Integumentary: No rashes, No breakdown, No significant lesion, No 

tenderness/swelling


Neurological: Normal gait, Normal speech, Normal strength at 5/5 x4 extr, Normal

tone


Other Physical/Emotional Findings: Pleasant and cooperative





- Studies


Laboratory Data (last 24 hrs)





07/20/20 11:57: PT 11.5, INR 0.97


07/20/20 11:57: WBC 3.9 L, Hgb 3.3 L*, Hct 9.0 L*, Plt Count 125 L


07/20/20 11:57: Sodium 141, Potassium 4.3, BUN 79 H, Creatinine 9.55 H*, Glucose

99, Magnesium 1.7 L, Total Bilirubin 1.3 H, AST 15, ALT 11 L, Alkaline 

Phosphatase 57





Microbiology Data (last 24 hrs): 








07/20/20 12:02   Throat   Group A Streptococcus Rapid Screen - Final








<Patrick Stiles - Last Filed: 07/20/20 15:47>





- Studies


Laboratory Data (last 24 hrs)





07/20/20 11:57: PT 11.5, INR 0.97


07/20/20 11:57: WBC 3.9 L, Hgb 3.3 L*, Hct 9.0 L*, Plt Count 125 L


07/20/20 11:57: Sodium 141, Potassium 4.3, BUN 79 H, Creatinine 9.55 H*, Glucose

99, Magnesium 1.7 L, Total Bilirubin 1.3 H, AST 15, ALT 11 L, Alkaline 

Phosphatase 57





Microbiology Data (last 24 hrs): 








07/20/20 12:02   Throat   Group A Streptococcus Rapid Screen - Final








<Freddie Geller - Last Filed: 07/20/20 16:44>





Assessment and Plan





- Plan





Impression:


Acute kidney failure complicated by a history of lupus, chronic anemia and 

possible nephritis:


History of lupus:


Severe Anemia of chronic disease:


Congestive heart failure status post pericardial window in 2008:





Plan:


Acute kidney failure complicated by a history of lupus, chronic anemia and 

possible nephritis:  Patient noted to have a significantly elevated creatinine 

level of 9.5 on admission.  This is likely secondary to a history of lupus, 

chronic anemia and nephritis.  Patient has not seen a rheumatologist and few 

years.  States she has been taking prednisone 10 mg daily dose for greater than 

10 years.  Prescription given by PCP at the [facility].  UA showing 3+ 

protein and 1+ blood consistent with nephritis.  Patient already started on IV 

fluids.  Will consult Nephrology Dr. Longoria.  Will monitor daily labs and 

await recommendations.


History of lupus:  The patient was diagnosed with lupus in 2008. She was seeing 

a rheumatologist in [facility] but moved to [facility] and has not seen a 

rheumatologist in many years.  Patient will likely benefit following up with the

rheumatologist on discharge.


Severe Anemia of chronic disease:  Patient noted to have the H&H of 3.3/9.0 on 

admission.  ER started patient on 2 units PRBCs.  Will continue monitoring H&H. 

Patient denies GI bleeding and there is no indication that the patient is 

bleeding.  Admits to a long history of chronic anemia secondary to her lupus. 


Congestive heart failure status post pericardial window in 2008:  Patient states

that she was told she had congestive heart failure in 2008 when she was 

diagnosed with lupus.  States that at that time she was noted to have a 

pericardial effusion and underwent pericardial window which resolve her issue.  

On admission it is noted that her BNP is elevated at 8398.  Will order 

echocardiogram for further evaluation.  Will consider cardiology consultation 

based on findings of echocardiogram.  Will place patient on telemetry and 

monitor. 





Discharge Plan: Home


Plan to discharge in: 72 Hours





- Advance Directives


Does patient have a Living Will: No


Does patient have a Durable POA for Healthcare: No





- Code Status/Comfort Care


Code Status Assessed: Yes


Time Spent Managing Pts Care (In Minutes): 55





<Patrick Stiles - Last Filed: 07/20/20 15:47>

## 2020-07-20 NOTE — EDPHYS
Physician Documentation                                                                           

 [facility] [place]                                                                 

Name: Jayde Mcduffie                                                                               

Age: 25 yrs                                                                                       

Sex: Female                                                                                       

: 1995                                                                                   

MRN: [id number]                                                                                   

Arrival Date: 2020                                                                          

Time: 09:45                                                                                       

Account#: [id number]                                                                            

Bed 13                                                                                            

Private MD:                                                                                       

ED Physician Camron Paris                                                                         

HPI:                                                                                              

                                                                                             

12:40 This 25 yrs old Black Female presents to ER via Ambulatory with complaints of Shortness snw 

      Of Breath, Eye Pain, Sore Throat.                                                           

12:40 The patient has shortness of breath at rest. Onset: The symptoms/episode began/occurred snw 

      1 month(s) ago, and became worse 1 week(s) ago, and became persistent. Duration: The        

      symptoms are continuous, and are steadily getting worse. Associated signs and symptoms:     

      Pertinent positives: palpitations. Severity of symptoms: At their worst the symptoms        

      were moderate in the emergency department the symptoms are unchanged. It is unknown         

      whether or not the patient has had similar symptoms in the past. The patient has been       

      recently seen by a physician: with similar presenting complaints. pt has Lupus and          

      thought she was having s/s of that. She does not see an endo and has been taking 10mg       

      prednisone daily for 10+ years.                                                             

                                                                                                  

OB/GYN:                                                                                           

10:05 LMP 2020                                                                           em  

                                                                                                  

Historical:                                                                                       

- Allergies:                                                                                      

10:05 PENICILLINS;                                                                            em  

- PMHx:                                                                                           

10:05 Lupus; Anemia;                                                                          em  

- PSHx:                                                                                           

10:05 elbow;                                                                                  em  

10:05 kidney biopsy;                                                                          em  

                                                                                                  

- Immunization history:: Adult Immunizations up to date.                                          

- Social history:: Smoking status: Patient denies any tobacco usage or history of.                

                                                                                                  

                                                                                                  

ROS:                                                                                              

12:44 Neck: Negative for injury, pain, and swelling.                                          snw 

12:44 Abdomen/GI: Negative for abdominal pain, nausea, vomiting, diarrhea, and constipation,      

      Back: Negative for injury and pain, : Negative for injury, bleeding, discharge, and       

      swelling, MS/Extremity: Negative for injury and deformity, Skin: Negative for injury,       

      rash, and discoloration, Neuro: Negative for headache, weakness, numbness, tingling,        

      and seizure.                                                                                

12:44 Constitutional: Positive for body aches, fatigue, malaise.                                  

12:44 Eyes: Positive for pain.                                                                    

12:44 ENT: Positive for sore throat.                                                              

12:44 Cardiovascular: Positive for palpitations.                                                  

12:44 Respiratory: Positive for dyspnea on exertion, shortness of breath.                         

                                                                                                  

Exam:                                                                                             

12:39 Head/Face:  Normocephalic, atraumatic.                                                  snw 

12:39 ENT:  Nares patent. No nasal discharge, no septal abnormalities noted.  Tympanic            

      membranes are normal and external auditory canals are clear.  Oropharynx with no            

      redness, swelling, or masses, exudates, or evidence of obstruction, uvula midline.          

      Mucous membranes moist. Neck:  Trachea midline, no thyromegaly or masses palpated, and      

      no cervical lymphadenopathy.  Supple, full range of motion without nuchal rigidity, or      

      vertebral point tenderness.  No Meningismus. Chest/axilla:  Normal chest wall               

      appearance and motion.  Nontender with no deformity.  No lesions are appreciated.           

12:39 Abdomen/GI:  Soft, non-tender, with normal bowel sounds.  No distension or tympany.  No     

      guarding or rebound.  No evidence of tenderness throughout. Back:  No spinal                

      tenderness.  No costovertebral tenderness.  Full range of motion. Skin:  Warm, dry with     

      normal turgor.  Normal color with no rashes, no lesions, and no evidence of cellulitis.     

      MS/ Extremity:  Pulses equal, no cyanosis.  Neurovascular intact.  Full, normal range       

      of motion. Neuro:  Awake and alert, GCS 15, oriented to person, place, time, and            

      situation.  Cranial nerves II-XII grossly intact.  Motor strength 5/5 in all                

      extremities.  Sensory grossly intact.  Cerebellar exam normal.  Normal gait. Psych:         

      Awake, alert, with orientation to person, place and time.  Behavior, mood, and affect       

      are within normal limits.                                                                   

12:39 Constitutional: The patient appears alert, awake, pale.                                     

12:39 Eyes: Conjunctiva: pale, bilaterally.                                                       

12:39 Cardiovascular: Rate: tachycardic, Rhythm: regular.                                         

12:39 Respiratory: the patient does not display signs of respiratory distress,  Respirations:     

      shallow respirations, tachypnea, Breath sounds: are clear throughout.                       

                                                                                                  

Vital Signs:                                                                                      

10:01  / 68; Pulse 103; Resp 18; Temp 98.6; Pulse Ox 100% on R/A; Weight 60.33 kg;      em  

      Height 4 ft. 11 in. (149.86 cm); Pain 0/10;                                                 

12:20  / 71; Pulse 111; Resp 20 S; Pulse Ox 100% on R/A;                                ca1 

13:10  / 67; Pulse 100; Resp 18 S; Pulse Ox 98% on R/A;                                 ca1 

13:59  / 64; Pulse 91; Resp 18 S; Pulse Ox 100% on R/A;                                 ca1 

15:14  / 60; Pulse 103; Resp 18 S; Pulse Ox 100% on R/A;                                ca1 

16:00 BP 94 / 55; Pulse 99; Resp 18 S; Pulse Ox 100% on R/A;                                  ca1 

16:29  / 55; Pulse 100; Resp 19 S; Pulse Ox 100% on R/A;                                ca1 

10:01 Body Mass Index 26.86 (60.33 kg, 149.86 cm)                                             em  

                                                                                                  

MDM:                                                                                              

11:28 Patient medically screened.                                                             snw 

13:39 Data reviewed: vital signs, nurses notes. Data interpreted: Pulse oximetry: on room air snw 

      is 98 %. Interpretation: normal. Counseling: I had a detailed discussion with the           

      patient and/or guardian regarding: the historical points, exam findings, and any            

      diagnostic results supporting the discharge/admit diagnosis, lab results, the need for      

      further work-up and treatment in the hospital. Physician consultation: Patrick KO[facility] was called at 13:41, was contacted at 13:41, regarding admission, to the telemetry       

      unit. would like consultation with Dr. Longoria.                                            

                                                                                                  

                                                                                             

11:27 Order name: Basic Metabolic Panel; Complete Time: 13:18                                 snw 

                                                                                             

11:27 Order name: CBC with Diff; Complete Time: 13:58                                         snw 

                                                                                             

11:27 Order name: LFT's; Complete Time: 13:18                                                 snw 

                                                                                             

11:27 Order name: Magnesium; Complete Time: 13:18                                             snw 

                                                                                             

11:27 Order name: NT PRO-BNP; Complete Time: 13:18                                            snw 

                                                                                             

11:27 Order name: PT-INR; Complete Time: 12:32                                                snw 

                                                                                             

11:27 Order name: Troponin (emerg Dept Use Only); Complete Time: 13:18                        snw 

                                                                                             

11:27 Order name: TSH; Complete Time: 13:18                                                   snw 

                                                                                             

11:27 Order name: TS                                                                          snw 

                                                                                             

11:27 Order name: Strep; Complete Time: 12:50                                                 snw 

                                                                                             

11:27 Order name: Urine Culture                                                               snw 

                                                                                             

11:27 Order name: Urine Microscopic Only; Complete Time: 12:14                                [facility] 

                                                                                             

11:28 Order name: Lactate; Complete Time: 12:32                                               [facility] 

                                                                                             

11:36 Order name: Urine Dipstick--Ancillary (enter results); Complete Time: 12:14             mt  

                                                                                             

11:27 Order name: XRAY Chest (1 view); Complete Time: 12:50                                   [facility] 

                                                                                             

11:27 Order name: EKG; Complete Time: 11:29                                                   [facility] 

                                                                                             

11:27 Order name: Cardiac monitoring; Complete Time: 12:08                                    [facility] 

                                                                                             

11:36 Order name: Urine Pregnancy--Ancillary (enter results); Complete Time: 12:14            mt  

                                                                                             

12:47 Order name: Bb Add On                                                                   [facility] 

                                                                                             

12:49 Order name: Throat Culture                                                              [facility]

                                                                                             

13:16 Order name: Packed RBC Leukored                                                         [facility]

                                                                                             

13:43 Order name: Manual Differential; Complete Time: 13:58                                   [facility]

                                                                                             

15:29 Order name: CONS Physician Consult                                                      [facility]

                                                                                             

16:29 Order name: NT PRO-BNP; Complete Time: 16:31                                            [facility]

                                                                                             

16:38 Order name: T4 Free; Complete Time: 16:45                                               [facility]

                                                                                             

11:27 Order name: EKG - Nurse/Tech; Complete Time: 12:08                                      [facility] 

                                                                                             

11:27 Order name: IV Saline Lock; Complete Time: 12:09                                        [facility] 

                                                                                             

11:27 Order name: Labs collected and sent; Complete Time: 12:09                               [facility] 

                                                                                             

11:27 Order name: O2 Per Protocol; Complete Time: 12:09                                       [facility] 

                                                                                             

11:27 Order name: O2 Sat Monitoring; Complete Time: 12:09                                     [facility] 

                                                                                             

11:27 Order name: Urine Pregnancy Test (obtain specimen); Complete Time: 11:34                [facility] 

                                                                                             

11:27 Order name: Urine Dipstick-Ancillary (obtain specimen); Complete Time: 11:34            [facility] 

                                                                                             

14:42 Order name: Urine Pregnancy Test (obtain specimen); Complete Time: 15:03                snw 

                                                                                                  

Administered Medications:                                                                         

      Discontinued: NS 0.9% 1000 ml IV at 125 ml/hr continuous                                    

11:50 Drug: NS 0.9% 1000 ml Route: IV; Rate: 125 ml/hr; Site: right antecubital;              ca1 

14:00 Follow up: Response: No adverse reaction; IV Status: Infusion continued upon admission  ca1 

14:30 Drug: D5W with Sodium Bicarbonate 100 mEq/L 1000 ml Route: IV; Rate: 125 ml/hr; Site:   ca1 

      right antecubital;                                                                          

15:15 Follow up: Response: Adverse reaction, Physician notified; IV Status: Infusion          ca1 

      continued upon admission                                                                    

                                                                                                  

                                                                                                  

Disposition:                                                                                      

18:26 Co-signature as Attending Physician, Camron Paris MD.                                    rn  

                                                                                                  

Disposition:                                                                                      

20 13:43 Hospitalization ordered by Patrick Stiles for Inpatient Admission.              

  Preliminary diagnosis are Acute renal failure, Lupus erythematosus, Anemia in                   

  other chronic diseases classified elsewhere.                                                    

- Bed requested for Telemetry/MedSurg (Inpatient).                                                

- Status is Inpatient Admission.                                                              ca1 

- Condition is Stable.                                                                            

- Problem is an acute exacerbation.                                                               

- Symptoms have worsened.                                                                         

                                                                                                  

                                                                                                  

                                                                                                  

Signatures:                                                                                       

Dispatcher [employer]                           EDMS                                                 

Kristi Yarbrough Shelly, FNP-C                   FNP-Csnw                                                  

Simone Calles, RN                        RN   em                                                   

Camron Paris MD MD   rn                                                   

AcobNiki RN                        RN   ca1                                                  

                                                                                                  

Corrections: (The following items were deleted from the chart)                                    

13:16 12:48 PACKED RBC LEUKORED [id number]-[id number] ordered. EDMS                                 EDMS

13:16 12:48 ABO/RH typing ordered. EDMS                                                       EDMS

13:16 12:48 Antibody Screen ordered. EDMS                                                     EDMS

16:00 13:43 Hospitalization Ordered by Patrick KO[facility] for Inpatient Admission.           bd  

      Preliminary diagnosis is Acute renal failure; Lupus erythematosus; Anemia in other          

      chronic diseases classified elsewhere. Bed requested for Telemetry/MedSurg (Inpatient).     

      Status is Inpatient Admission. Condition is Stable. Problem is an acute exacerbation.       

      Symptoms have worsened. snw                                                                 

17:36 16:00 2020 13:43 Hospitalization Ordered by Patrick KO[facility] for Inpatient     ca1 

      Admission. Preliminary diagnosis is Acute renal failure; Lupus erythematosus; Anemia in     

      other chronic diseases classified elsewhere. Bed requested for Telemetry/MedSurg            

      (Inpatient). Status is Inpatient Admission. Condition is Stable. Problem is an acute        

      exacerbation. Symptoms have worsened. bd                                                    

                                                                                                  

**************************************************************************************************

## 2020-07-20 NOTE — ER
Nurse's Notes                                                                                     

 [facility]                                                                 

Name: Jayde Mcduffie                                                                               

Age: 25 yrs                                                                                       

Sex: Female                                                                                       

: 1995                                                                                   

MRN: [id number]                                                                                   

Arrival Date: 2020                                                                          

Time: 09:45                                                                                       

Account#: [id number]                                                                            

Bed 13                                                                                            

Private MD:                                                                                       

Diagnosis: Acute renal failure;Lupus erythematosus;Anemia in other chronic diseases classified    

  elsewhere                                                                                       

                                                                                                  

Presentation:                                                                                     

                                                                                             

10:01 Chief complaint: Patient states: shortness of breath, sore throat and yvonne. eye pain     em  

      that started 1 month ago, reports low grade fever, +N -V-D, right sided flank pain.         

      Coronavirus screen: Patient reports a cough. Patient reports shortness of breath or         

      difficulty breathing. Patient reports a measured and/or subjective temperature greater      

      than 100.4F. Patient denies travel on a cruise ship or to a country the [facility] currently       

      lists as an affected area. Patient denies contact with known and/or suspected case of       

      COVID-19. Ebola Screen: Patient negative for fever greater than or equal to 101.5           

      degrees Fahrenheit, and additional compatible Ebola Virus Disease symptoms Patient          

      denies exposure to infectious person. Patient denies travel to an Ebola-affected area       

      in the 21 days before illness onset. No symptoms or risks identified at this time.          

      Initial Sepsis Screen: Does the patient meet any 2 criteria? HR > 90 bpm. No. Patient's     

      initial sepsis screen is negative. Does the patient have a suspected source of              

      infection? No. Patient's initial sepsis screen is negative. Risk Assessment: Do you         

      want to hurt yourself or someone else? Patient reports no desire to harm self or            

      others. Onset of symptoms was 2020.                                                

10:01 Method Of Arrival: Ambulatory                                                           em  

10:01 Acuity: ADRIAN 3                                                                           em  

                                                                                                  

OB/GYN:                                                                                           

10:05 LMP 2020                                                                           em  

                                                                                                  

Historical:                                                                                       

- Allergies:                                                                                      

10:05 PENICILLINS;                                                                            em  

- PMHx:                                                                                           

10:05 Lupus; Anemia;                                                                          em  

- PSHx:                                                                                           

10:05 elbow;                                                                                  em  

10:05 kidney biopsy;                                                                          em  

                                                                                                  

- Immunization history:: Adult Immunizations up to date.                                          

- Social history:: Smoking status: Patient denies any tobacco usage or history of.                

                                                                                                  

                                                                                                  

Screenin:30 Abuse screen: Denies threats or abuse. Denies injuries from another. Nutritional        ca1 

      screening: No deficits noted. Tuberculosis screening: No symptoms or risk factors           

      identified. Fall Risk IV access (20 points).                                                

                                                                                                  

Assessment:                                                                                       

11:30 General: Appears in no apparent distress. comfortable, Behavior is calm, cooperative,   ca1 

      appropriate for age. Pain: Denies pain. Neuro: Level of Consciousness is awake, alert,      

      obeys commands, Oriented to person, place, time, situation. Cardiovascular: Heart tones     

      S1 S2 present Capillary refill < 3 seconds Patient's skin is warm and dry. Rhythm is        

      sinus rhythm. Respiratory: Reports shortness of breath on exertion Airway is patent         

      Respiratory effort is even, unlabored, Respiratory pattern is regular, symmetrical,         

      Breath sounds are clear bilaterally. GI: Abdomen is flat, non-distended, Bowel sounds       

      present X 4 quads. Abd is soft and non tender X 4 quads. : No signs and/or symptoms       

      were reported regarding the genitourinary system. EENT: No signs and/or symptoms were       

      reported regarding the EENT system. Derm: Skin is intact, is healthy with good turgor,      

      Skin is pink, warm \T\ dry. Musculoskeletal: Circulation, motion, and sensation intact.     

      Capillary refill < 3 seconds.                                                               

12:18 Reassessment: PER LAB, HGB GROSSLY ABNORMAL. PROVIDER NOTIFIED.                         bp  

12:20 Reassessment: Patient appears in no apparent distress at this time. No changes from     ca1 

      previously documented assessment. Patient and/or family updated on plan of care and         

      expected duration. Pain level reassessed. Patient is alert, oriented x 3, equal             

      unlabored respirations, skin warm/dry/pink.                                                 

13:10 Reassessment: Patient appears in no apparent distress at this time. Patient and/or      ca1 

      family updated on plan of care and expected duration. Pain level reassessed. Patient is     

      alert, oriented x 3, equal unlabored respirations, skin warm/dry/pink.                      

13:59 Reassessment: Patient appears in no apparent distress at this time. Patient and/or      ca1 

      family updated on plan of care and expected duration. Pain level reassessed. Patient is     

      alert, oriented x 3, equal unlabored respirations, skin warm/dry/pink.                      

15:14 Reassessment: Patient appears in no apparent distress at this time. Patient and/or      ca1 

      family updated on plan of care and expected duration. Pain level reassessed. Patient is     

      alert, oriented x 3, equal unlabored respirations, skin warm/dry/pink.                      

16:23 Reassessment: Patient appears in no apparent distress at this time. Patient and/or      ca1 

      family updated on plan of care and expected duration. Pain level reassessed. Patient is     

      alert, oriented x 3, equal unlabored respirations, skin warm/dry/pink. Called for           

      report. Nurse will call back.                                                               

                                                                                                  

Vital Signs:                                                                                      

10:01  / 68; Pulse 103; Resp 18; Temp 98.6; Pulse Ox 100% on R/A; Weight 60.33 kg;      em  

      Height 4 ft. 11 in. (149.86 cm); Pain 0/10;                                                 

12:20  / 71; Pulse 111; Resp 20 S; Pulse Ox 100% on R/A;                                ca1 

13:10  / 67; Pulse 100; Resp 18 S; Pulse Ox 98% on R/A;                                 ca1 

13:59  / 64; Pulse 91; Resp 18 S; Pulse Ox 100% on R/A;                                 ca1 

15:14  / 60; Pulse 103; Resp 18 S; Pulse Ox 100% on R/A;                                ca1 

16:00 BP 94 / 55; Pulse 99; Resp 18 S; Pulse Ox 100% on R/A;                                  ca1 

16:29  / 55; Pulse 100; Resp 19 S; Pulse Ox 100% on R/A;                                ca1 

10:01 Body Mass Index 26.86 (60.33 kg, 149.86 cm)                                             em  

                                                                                                  

ED Course:                                                                                        

09:45 Patient arrived in ED.                                                                  bp1 

09:46 Jeannine Cuevas FNP-C is PHCP.                                                          snw 

09:46 Camron Paris MD is Attending Physician.                                                snw 

10:04 Triage completed.                                                                       em  

10:05 Arm band placed on.                                                                     em  

11:02 Niki Balderas, RN is Primary Nurse.                                                      ca1 

11:30 Patient has correct armband on for positive identification. Placed in gown. Bed in low  ca1 

      position. Call light in reach. Side rails up X2. Cardiac monitor on. Pulse ox on. NIBP      

      on. Warm blanket given.                                                                     

11:47 Initial lab(s) drawn, by me, sent to lab. Strep swab sent to lab. Inserted saline lock: ca1 

      20 gauge in right antecubital area, using aseptic technique. Blood collected.               

12:35 XRAY Chest (1 view) In Process Unspecified.                                             EDMS

12:45 Repeat lab(s) drawn. sent to lab.                                                       ca1 

13:42 Patrick Stiles PA is Hospitalizing Provider.                                        snw 

15:11 Inserted saline lock: 18 gauge in left antecubital area, using aseptic technique.       4 

16:24 No provider procedures requiring assistance completed. Patient admitted, IV remains in  ca1 

      place.                                                                                      

                                                                                                  

Administered Medications:                                                                         

      Discontinued: NS 0.9% 1000 ml IV at 125 ml/hr continuous                                    

11:50 Drug: NS 0.9% 1000 ml Route: IV; Rate: 125 ml/hr; Site: right antecubital;              ca1 

14:00 Follow up: Response: No adverse reaction; IV Status: Infusion continued upon admission  ca1 

14:30 Drug: D5W with Sodium Bicarbonate 100 mEq/L 1000 ml Route: IV; Rate: 125 ml/hr; Site:   ca1 

      right antecubital;                                                                          

15:15 Follow up: Response: Adverse reaction, Physician notified; IV Status: Infusion          ca1 

      continued upon admission                                                                    

                                                                                                  

                                                                                                  

Outcome:                                                                                          

13:43 Decision to Hospitalize by Provider.                                                    snw 

17:05 Admitted to Med/surg accompanied by tech, via wheelchair, room 228, with chart, Report  ca1 

      called to  TREVOR Husain                                                                         

17:05 Condition: stable                                                                           

17:05 Instructed on the need for admit.                                                           

17:36 Patient left the ED.                                                                    ca1 

                                                                                                  

Signatures:                                                                                       

Dispatcher [employer]                           EDMS                                                 

Jeannine Cuevas, FNP-C                   FNP-Csnw                                                  

Simone Calles RN RN em Peltier, Brian, RN RN bp Acob, Cheryl, RN RN   ca1                                                  

Austin Stein                                 [facility]                                                  

Santa Hale                           [facility]                                                  

                                                                                                  

Corrections: (The following items were deleted from the chart)                                    

10:04 10:01 Pulse 103bpm; Resp 18bpm; Pulse Ox 100% RA; Temp 98.6F; 60.33 kg; Height 4 ft. 11 em  

      in.; BMI: 26.8; Pain 0/10; em                                                               

16:32 16:23 BP 94 / 55; Pulse 99bpm; Resp 18bpm; Spontaneous; Pulse Ox 100% RA; ca1           ca1 

                                                                                                  

**************************************************************************************************

## 2020-07-20 NOTE — XMS REPORT
Clinical Summary

                            Created on:2020



Patient:Jayde Mcduffie

Sex:Female

:1995

External Reference #:[id number]





Demographics







                          Address                   [address] [place] [address]



                                                    [address] [place] [id number]

 

                          Home Phone                [phone]

 

                          Mobile Phone              [phone]

 

                          Home Phone                [phone]

 

                          Email Address             [email or web address]

 

                          Preferred Language        English

 

                          Marital Status            Single

 

                          Pentec[employer]alism Affiliation     Unknown

 

                          Race                      Black or 

 

                          Ethnic Group              Not  or 









Author







                          Organization              [employer]

 

                          Address                   [address] [place] [address]









Support







                Name            Relationship    Address         Phone

 

                Patt Peng    Unavailable     [address] [place] [address] [phone]



                                                [address] [place] [id number] 









Care Team Providers







                    Name                Role                Phone

 

                    Asked, No Pcp       Primary Care Provider Unavailable









Allergies







             Active Allergy Reactions    Severity     Noted Date   Comments

 

             Penicillins  Shortness Of Breath High         2018   







Medications

No known medications



Active Problems

Not on file



Social History







             Tobacco Use  Types        Packs/Day    Years Used   Date

 

             Never Smoker                                        









                Smokeless Tobacco: Never Used                                 









                Alcohol Use     Drinks/Week     oz/Week         Comments

 

                Yes                                             Occasionally









                          Sex Assigned at Birth     Date Recorded

 

                          Not on file               









                    Job Start Date      Occupation          Industry

 

                    Not on file         Not on file         Not on file









                    Travel History      Travel Start        Travel End









                                        No recent travel history available.







Last Filed Vital Signs

Not on file



Plan of Treatment







                Health Maintenance Due Date        Last Done       Comments

 

                CERVICAL CANCER SCREENING 2015                      

 

                INFLUENZA VACCINE 2020                      







Results

Not on fileafter 2019



Insurance







          Payer     Benefit Plan / Subscriber ID Effective Dates Phone     Addre

ss   Type



                    Group                                             

 

          [employer]      [employer] xxxxxxxxxxxx 2016-Present           [address]



                                        



                                                            [place] [address] 









           Guarantor Name Account Type Relation to Date of Birth Phone      Bill

ing



                                 Patient                          Address

 

           Jayde Mcduffie Personal/Family Self       1995 [phone] [address] [place] [address]



                                                       ([place])     [address] [place] [address]







                                                                  [address] [place] [address]



                                                                  [id number]







Advance Directives

For more information, please contact: [phone]





                Type            Date Recorded   Patient Representative Explanati

on

 

                Advance Directives, 2016 11:51 PM                 



                Living Will and                                 



                Medical Power of                                 



                                                        

 

                Advance Directives, 11/10/2016  1:17 AM                 



                Living Will and                                 



                Medical Power of

## 2020-07-20 NOTE — RAD REPORT
EXAM DESCRIPTION:  RAD - Chest Single View - 7/20/2020 12:35 pm

 

CLINICAL HISTORY:  DYSPNEA, sore throat, low-grade fever

 

COMPARISON:  None

 

TECHNIQUE:  AP portable chest image was obtained 7/20/2020 12:35 pm .

 

FINDINGS:  Lung volumes are clear. No peripheral mass or consolidation. Heart and vasculature are nor
mal. No measurable pleural effusion and no pneumothorax. No acute bony abnormality seen. No acute aor
tic findings suspected.

 

IMPRESSION:  No acute cardiopulmonary process.

## 2020-07-20 NOTE — XMS REPORT
Continuity of Care Document

                            Created on:2020



Patient:NELLIE JOLLY

Sex:Female

:1995

External Reference #:[id number]





Demographics







                          Address                   [address] [address]



                                                    [place] [address]

 

                          Home Phone                [phone]

 

                          Work Phone                [phone]

 

                          Mobile Phone              [phone]

 

                          Email Address             [email or web address]

 

                          Preferred Language        English

 

                          Marital Status            Unknown

 

                          Yarsani Affiliation     Unknown

 

                          Race                      Unknown

 

                          Additional Race(s)        Unavailable



                                                    Unavailable



                                                    White



                                                    Black or 

 

                          Ethnic Group              Not  or 









Author







                          Organization              [facility]

t

 

                          Address                   [address]



                                                    [place] [address]

 

                          Phone                     [phone]









Support







                Name            Relationship    Address         Phone

 

                Yeny Tay  Life Partner    [address] [phone]





                                                [place] [address] 

 

                [employer] leon   Other           [address] [phone]



                                                         



                                                [place] [address] 

 

                [employer]   Aunt            [address]   [phone]



                                                [place] [address] 









Care Team Providers







                    Name                Role                Phone

 

                    Asked, No Pcp       Primary Care Physician Unavailable

 

                    David NP,  G       Attending Clinician [phone]

 

                    Doctor Unassigned,  Name Attending Clinician Unavailable









Problems







       Condition Condition Condition Status Onset  Resolution Last   Treating Co

mments 

Source



       Name   Details Category        Date   Date   Treatment Clinician        



                                                 Date                 

 

       Thyroid Thyroid Disease Active 2017                             [facility]



       lesion lesion               0                               Health



                                   00:00:                             



                                   00                                 

 

       Lymphadeno Lymphadeno Disease Active                              H

bharat starks,                                              Health



       cervical cervical               00:00:                             



                                   00                                 

 

       Anemia Anemia Disease Active                              [facility]



                                                                  Health



                                   00:00:                             



                                   00                                 

 

       Systemic Systemic Disease Active                              [facility] lorenzo



       lupus  lupus                                               Health



       erythemato erythemato               00:00:                             



       blanche with blanche with               00                                 



       glomerular glomerular                                                  



       disease disease                                                  

 

       Cold   Cold   Disease Active                              [facility]



       agglutinin agglutinin                                              He

alth



       disease disease               00:00:                             



                                   00                                 

 

       Menorrhagi Menorrhagi Disease Active                                    H

myriamis



       a with a with                                                  Health



       regular regular                                                  



       cycle  cycle                                                   

 

       Uterine Uterine Disease Active                                    [facility]



       leiomyoma leiomyoma                                                  [facility]

th

 

       Acquired Acquired Disease Active                                    [facility] l[facility]



       hemolytic hemolytic                                                  [facility]

th



       anemia anemia                                                  

 

       Current Current Disease Active                                    [facility]



       use of use of                                                  Health



       steroid steroid                                                  



       medication medication                                                  

 

       High risk High risk Disease Active                                    [facility]

[facility]



       medication medication                                                  He

alth



       use    use                                                     

 

       Lupus  Lupus  Disease Active                                    [place]



       nephritis nephritis                                                  [facility]

th

 

       Proteinuri Proteinuri Disease Active                                    [facility]

[facility]



       a      a                                                       Health







Allergies, Adverse Reactions, Alerts







       Allergy Allergy Status Severity Reaction(s) Onset  Inactive Treating Comm

ents 

Source



       Name   Type                        Date   Date   Clinician        

 

       Penicill Propensi Active        Shortness Of                       

[place]



       ins    ty to                Breath                         [facility]i



              adverse                      00:00:                      st



              reaction                      00                          



              s to                                                    



              drug                                                    

 

       Amoxicil Propensi Active                                     [place]



       hansa-Pot ty to                                               Health



       Clavulan adverse                      00:00:                      



       ate    reaction                      00                          



              s to                                                    



              drug                                                    

 

       Penicill Propensi Active                                     [place]



       ins    ty to                                               Health



              adverse                      00:00:                      



              reaction                      00                          



              s to                                                    



              drug                                                    







Family History







           Family Member Diagnosis  Comments   Start Date Stop Date  Source

 

           Natural father Heart failure                                  [facility]

 

           Maternal grandfather Diabetes                                    [facility]

 

           Maternal grandfather Heart failure                                  H

[facility]

 

           Maternal grandmother Stomach cancer                                  

[facility]

 

           Natural mother Cerebral aneurysm                                  [facility]

 

           Natural mother Heart failure                                  [facility]

 

           Natural mother Hyperthyroidism                                  [facility]

[facility]







Social History







           Social Habit Start Date Stop Date  Quantity   Comments   Source

 

           Sex Assigned At                                             [place] [facility]

alth



           Birth                                                  

 

           Alcohol Comment 2018 Occasionally            [place]



                      00:00:00   00:00:00                         [facility]

 

           Alcohol intake 2017-10-01 2017-10-01 Current non-drinker            H

[facility] [employer]



                      00:00:00   00:00:00   of alcohol            



                                            (finding)             









                Smoking Status  Start Date      Stop Date       Source

 

                Never smoker                                    [facility]







Medications







       Ordered Filled Start  Stop   Current Ordering Indication Dosage Frequency

 Signature

                    Comments            Components          Source



     Medication Medication Date Date Medication? Clinician                (SIG) 

          



     Name Name                                                   

 

     hydroxychlo      2017      Yes            200mg QD   Take 200           [facility]

[facility]



     roquine      0-08                               mg by           [facility]



     (PLAQUENIL)      18:25:                               mouth           



     200 mg      57                                 daily.           



     tablet                                                        

 

     ferrous      2017      Yes            325mg QD   Take 325           [facility]

s



     sulfate 325      0-08                               mg by           [facility]



     mg (65 mg      18:25:                               mouth           



     iron)      57                                 daily           



     tablet                                         (with           



                                                  breakfast)           



                                                  .              

 

     aspirin      2017      Yes            81mg QD   Chew and           [facility]



     (ASPIRIN)      0-08                               swallow 81           [facility]

th



     81 mg      18:25:                               mg by           



     chewable      57                                 mouth           



     tablet                                         daily.           

 

     sertraline      2017      Yes            50mg QD   Take 50 mg           H

arrketan



     (ZOLOFT) 50      0-08                               by mouth           [facility]

th



     mg tablet      18:25:                               daily.           



               57                                                

 

     atovaquone      2017      Yes            1500mg QD   Take 10 mL          

 [facility]



     (MEPRON)      0-08                               by mouth           Health



     750 mg/5 mL      00:00:                               daily.           



     oral      00                                                



     suspension                                                        

 

     folic acid      2017      Yes            1mg  QD   Take 1           [facility]

s



     (FOLVITE) 1      0-08                               tablet by           [facility]

lth



     mg tablet      00:00:                               mouth           



               00                                 daily.           

 

     sennosides-      2017      Yes            1{tbl} QD   Take 1           [facility]

[facility]



     docusate      0-08                               tablet by           Health



     sodium      00:00:                               mouth           



     (SENNA      00                                 daily.           



     PLUS)                                                        



     8.6-50 mg                                                        



     tablet                                                        

 

     hydroxychlo      2017      Yes            200mg QD   Take 1           [facility]

ris



     roquine      0-08                               tablet by           [facility]



     (PLAQUENIL)      00:00:                               mouth           



     200 mg      00                                 daily.           



     tablet                                                        

 

     omeprazole      2017      Yes            40mg QD   Take 2           [facility]

s



     (PRILOSEC)      0-08                               capsules           Healt

h



     20 mg      00:00:                               by mouth           



     delayed      00                                 daily.           



     release                                                        



     capsule                                                        

 

     mycophenola      2017      Yes       Systemic 1500mg Q.5D Take 3         

  [facility]



     te        0-08                lupus           tablets by           [facility]



     (CELLCEPT)      00:00:                erythematos           mouth 2        

   



     500 mg      00                  us with           times           



     tablet                          glomerular           daily.           



                                   disease,                          



                                   unspecified                          



                                   SLE type                          

 

     predniSONE      2017      Yes            60mg QD   Take 3           [facility]

s



     (DELTASONE)      0-08                               tablets by           He

alth



     20 mg      00:00:                               mouth           



     tablet      00                                 daily Take           



                                                  60mg (3           



                                                  tablets)           



                                                  daily for           



                                                  4              



                                                  weeksTake           



                                                  50mg (2.5           



                                                  tablets)           



                                                  daily for           



                                                  2              



                                                  weeksThen           



                                                  take 40mg           



                                                  (2             



                                                  tablets)           



                                                  daily           



                                                  until seen           



                                                  in clinic.           

 

     ondansetron      2017      Yes            4mg       Take 1           [facility]

is



     (ZOFRAN) 4      0-08                               tablet by           [facility]

th



     mg tablet      00:00:                               mouth           



               00                                 every 8           



                                                  hours as           



                                                  needed for           



                                                  up to 5           



                                                  doses for           



                                                  Nausea.           







Procedures

This patient has no known procedures.



Plan of Care







             Planned Activity Planned Date Details      Comments     Source

 

             Future Scheduled 2020-10-01   IMM Influenza              [facility]



             Test         00:00:00     Seasonal Oct to              



                                       March (>/= 19 yrs)              



                                       [code = IMM               



                                       Influenza Seasonal              



                                       Oct to March (>/= 19              



                                       yrs)]                     

 

             Future Scheduled 2020   INFLUENZA VACCINE              [facility]



             Test         00:00:00     [code = INFLUENZA              



                                       VACCINE]                  

 

             Future Scheduled 2015   Screening for              [facility]

thodist



             Test         00:00:00     malignant neoplasm              



                                       of cervix                 



                                       (procedure) [code =              



                                       950792934]                

 

             Future Scheduled 2015   Screening for              [facility]

lt



             Test         00:00:00     malignant neoplasm              



                                       of cervix                 



                                       (procedure) [code =              



                                       355430931]                







Encounters







        Start   End     Encounter Admission Attending Care    Care    Encounter 

Source



        Date/Time Date/Time Type    Type    Clinicians Facility Department ID   

   

 

        2020 Emergency         [facility]    [id number]

[id number] 



        17:16:57 20:21:00                 Caryl Cox [id number]         



                                                [place] [id number]         



                                                [place]  [phone]         



                                                        084             

 

        2020 Orders          Doctor  CARMELA    [id number]

40 



        00:00:00 00:00:00 Only            Unassigned, MALIKA   [id number]       

  



                                        [place] [id number]         



                                                        [phone]         



                                                        009             

 

        2017-11-10 2017-11-10 Outpatient                 [facility]     [id number]

72 [place]



        00:00:00 00:00:00                                                 Health

 

        2017-10-03 2017-10-03 Outpatient                 [facility]     [id number]

78 [place]



        07:37:31 07:37:31                                                 Health

 

        2017-10-01 2017-10-01 Outpatient                 [facility]     [id number]

67 [place]



        07:21:26 07:21:26                                                 Health

 

        2017 Outpatient                 [facility]     [id number]

80 [place]



        15:25:08 15:25:08                                                 Health

 

        2017 Outpatient                 [facility]     [id number]

51 [place]



        10:38:27 10:38:27                                                 Health

 

        2017 Emergency                 [facility]     [id number]

2 [place]



        04:19:42 04:19:42                                                 Health

 

        2017 [facility]patient                 [facility]     [id number]

0 [place]



        00:52:05 00:52:05                                                 [facility]

 

        2017 Emergency                 [facility]     [id number]

4 [place]



        18:41:35 18:41:35                                                 Health







Results

This patient has no known results.

## 2020-07-21 LAB
ALBUMIN SERPL BCP-MCNC: 2.4 G/DL (ref 3.4–5)
ALP SERPL-CCNC: 50 U/L (ref 45–117)
ALT SERPL W P-5'-P-CCNC: 8 U/L (ref 12–78)
AST SERPL W P-5'-P-CCNC: 14 U/L (ref 15–37)
BUN BLD-MCNC: 74 MG/DL (ref 7–18)
COHGB MFR BLDA: 3.6 % (ref 0–1.5)
GLUCOSE SERPLBLD-MCNC: 79 MG/DL (ref 74–106)
HCT VFR BLD CALC: 8.1 % (ref 36–45)
LYMPHOCYTES # SPEC AUTO: 1.5 K/UL (ref 0.7–4.9)
MAGNESIUM SERPL-MCNC: 2 MG/DL (ref 1.8–2.4)
OXYHGB MFR BLDA: 94.8 % (ref 94–97)
PMV BLD: 8.5 FL (ref 7.6–11.3)
POTASSIUM SERPL-SCNC: 4.5 MMOL/L (ref 3.5–5.1)
RBC # BLD: < 1.05 M/UL (ref 3.86–4.86)
SAO2 % BLDA: 99.6 % (ref 92–98.5)

## 2020-07-21 RX ADMIN — Medication SCH ML: at 09:55

## 2020-07-21 RX ADMIN — Medication SCH ML: at 22:24

## 2020-07-21 RX ADMIN — IRON SUPPLEMENT SCH MG: 325 TABLET ORAL at 15:32

## 2020-07-21 RX ADMIN — ACETAMINOPHEN PRN MG: 500 TABLET, FILM COATED ORAL at 09:27

## 2020-07-21 RX ADMIN — SODIUM CHLORIDE SCH: 0.45 INJECTION, SOLUTION INTRAVENOUS at 06:00

## 2020-07-21 RX ADMIN — SODIUM BICARBONATE TAB 325 MG SCH MG: 325 TAB at 22:22

## 2020-07-21 RX ADMIN — IRON SUPPLEMENT SCH MG: 325 TABLET ORAL at 22:22

## 2020-07-21 RX ADMIN — SODIUM CHLORIDE SCH MLS: 0.45 INJECTION, SOLUTION INTRAVENOUS at 15:38

## 2020-07-21 RX ADMIN — ONDANSETRON PRN MG: 2 INJECTION INTRAMUSCULAR; INTRAVENOUS at 16:49

## 2020-07-21 RX ADMIN — HEPARIN SODIUM SCH UNIT: 5000 INJECTION, SOLUTION INTRAVENOUS; SUBCUTANEOUS at 00:24

## 2020-07-21 RX ADMIN — SODIUM CHLORIDE SCH MLS: 0.9 INJECTION, SOLUTION INTRAVENOUS at 00:16

## 2020-07-21 NOTE — EKG
Test Date:    2020-07-20               Test Time:    12:06:42

Technician:   CA                                     

                                                     

MEASUREMENT RESULTS:                                       

Intervals:                                           

Rate:         94                                     

FL:           146                                    

QRSD:         70                                     

QT:           374                                    

QTc:          467                                    

Axis:                                                

P:            40                                     

FL:           146                                    

QRS:          62                                     

T:            49                                     

                                                     

INTERPRETIVE STATEMENTS:                                       

                                                     

Normal sinus rhythm

Normal ECG

No previous ECG available for comparison



Electronically Signed On 07-21-20 10:14:02 CDT by Brice Nuñez

## 2020-07-21 NOTE — CON
Date of Consultation:  07/20/2020



Chief Complaint:  Acute kidney injury.



History Of Present Illness:  Acute kidney injury, severe, associated with acute and chronic anemia.  
The patient presented to the hospital because of generalized weakness.  The patient was found to have
 severe hyperazotemia, BUN 79, creatinine 9.55.  Electrolytes as follows; sodium 141, potassium 4.3, 
chloride 116, CO2 of 16, glucose 99, and calcium 7.1.  The patient has history of lupus.  She has bee
n seen by rheumatologist at [place], [place].  She recently moved to [place].  Blood work done on June 22
, 2019, showed BUN of 11 and creatinine of 0.85.  The patient has severe acute kidney injury.  The britney strauss was found to have anemia, hemoglobin level is 3.3 and hematocrit is 9.0.  The patient will have
 blood transfusion.  Previously on June 22, 2019, hemoglobin was 11.6.  The patient is admitted to [facility].  She will have blood transfusion today.  She was found to have severe acute kidney injury
.  Plan is to monitor urine output.  The patient has history of SLE and chronic anemia.  The patient 
is 25-year-old  female with past medical history of SLE, anemia, and she presented to
 the hospital because of shortness of breath, sore throat, low-grade fever.  She became ill and short
 of breath over last several days.  The patient was complaining of intermittent cough.  She denied co
ntact with COVID positive person.  She was diagnosed with lupus back in 2008.  She at that time had c
ongestive heart failure and underwent pericardial window for pericardial effusion with tamponade.  Roberth pro was seen by rheumatologist in [place], [place] and recently was on prednisone 10 mg daily and was treate
d with prednisone over last 10 to 12 years.  She had kidney biopsy done, but she does not recall resu
lts of the kidney biopsy.  In the emergency room, she was evaluated and was found to have creatinine 
of 9.55, BUN 79, H and H of 3.3 and 9.  BNP was 8398.  She did not require O2 support, temperature wa
s 98.6, blood pressure was 117/68, heart rate 103 and respiratory rate 18, SpO2 of 100%.  IV fluids w
ere initiated in the emergency room for acute kidney injury and the patient is to have 2 units of pac
ked red blood cell transfusion.



Past Medical History:  SLE, acute kidney injury, congestive heart failure, status post pericardial wi
ndow in 2008, chronic anemia, right kidney biopsy.



Family History:  Mother; heart disease, kidney disease.



Social History:  Denies tobacco, alcohol, or illicit drugs.



Review of Systems:

General:  Complains of fever and malaise. 

Eyes:  Denies vision changes, was complaining of some decreased vision over last several months. 

Respiratory:  Was complaining of nonproductive cough, shortness of breath with exacerbation. 

Cardiovascular:  Denies chest pain or syncope. 

Gastrointestinal:  Was complaining of nausea and vomiting. 

:  Denies hematuria or dysuria. 

Musculoskeletal:  Denies muscle aches or joint swelling. 

All other systems reviewed and all are negative.



Physical Examination:

Vital Signs:  Blood pressure 103/67, heart rate 100, respiratory rate 18, and SpO2 of 98%. 

Eyes:  Anicteric sclerae.  EOMI. 

Ears, Nose, Mouth and Throat:  Oral mucosa moist.  No pallor. 

Neck:  Supple, no bruits. 

Lungs:  Diminished breath sounds at bases. 

Heart:  S1 and S2. 

Abdomen:  Soft, benign, obese, nontender.  No rebound.  No guarding. 

Extremities:  No edema.  No clubbing.  No cyanosis. 

Neurological:  Moving extremities.  Cranial nerves intact. 

Psychiatric:  Alert and oriented x3.  Normal affect.



Laboratory Data:  WBC 3.9, hemoglobin 3.3, hematocrit 9.0, and platelet count 125,000.  PT 11.5, INR 
0.97, sodium 141, potassium 4.3, BUN 79, creatinine 9.55, glucose 99, magnesium 1.7, total bilirubin 
1.3, AP 57, AST 15, ALT 11.



Impression And Plan:  

1.Severe acute kidney injury.  The patient will have IV fluids to prevent renal hypoperfusion.  Feliz
mmend to avoid nonsteroidal anti-inflammatory medication.  The patient has history of lupus and she h
ad kidney biopsy done at [place], [place].  Plan is to obtain medical records from her rheumatologist.  In
 2019, blood work showed relatively stable kidney function without kidney insufficiency.

2.Severe anemia, acute on chronic.  The patient will have blood transfusion.  The patient needs a wo
rkup for acute and chronic anemia.

3.Monitor urine output.  The patient has severe acute kidney injury.  Check CK level to rule out rha
bdomyolysis.  The patient is undergoing workup to rule out infection including COVID infection, the p
atient will have antibiotics for pneumonia.

4.Plan is to check renal ultrasound to rule out obstructive uropathy and check vasculitis panel to r
ule out rapidly progressive glomerulonephritis.  The patient may need renal biopsy done during this a
dmission if renal function does not improve.





KEN/CRAIG

DD:  07/20/2020 19:55:06Voice ID:  [id number]

DT:  07/21/2020 00:31:45Report ID:  [id number]

## 2020-07-21 NOTE — P.PN
Subjective


Date of Service: 07/21/20


Primary Care Provider: [facility]


Chief Complaint: Acute renal failure/lupus/anemia


Subjective: No new changes, No C/O voiced





<Pj Marquez - Last Filed: 07/21/20 10:32>


Date of Service: 07/21/20





<Freddie Geller - Last Filed: 07/21/20 14:33>





Review of Systems


Unremarkable





<Pj Marquez - Last Filed: 07/21/20 10:32>





Physical Examination





- Vital Signs


Temperature: 99.4 F


Blood Pressure: 103/55


Pulse: 100


Respirations: 18


Pulse Ox (%): 99





- Physical Exam


General: Alert, In no apparent distress


HEENT: Atraumatic, PERRLA, EOMI


Neck: Supple, JVD not distended


Respiratory: Clear to auscultation bilaterally, Normal air movement


Cardiovascular: Regular rate/rhythm, Normal S1 S2


Gastrointestinal: Normal bowel sounds, No tenderness


Musculoskeletal: No tenderness


Integumentary: No rashes


Neurological: Normal speech, Normal tone, Normal affect


Lymphatics: No axilla or inguinal lymphadenopathy


Other Physical/Emotional Findings: Pleasant and cooperative





- Studies


Laboratory Data (last 24 hrs)





07/20/20 11:57: PT 11.5, INR 0.97


07/20/20 11:57: WBC 3.9 L, Hgb 3.3 L*, Hct 9.0 L*, Plt Count 125 L


07/20/20 11:57: Sodium 141, Potassium 4.3, BUN 79 H, Creatinine 9.55 H*, Glucose

99, Magnesium 1.7 L, Total Bilirubin 1.3 H, AST 15, ALT 11 L, Alkaline 

Phosphatase 57





Microbiology Data (last 24 hrs): 








07/20/20 12:02   Throat   Group A Streptococcus Rapid Screen - Final





Medications List Reviewed: Yes





<Pj Marquez - Last Filed: 07/21/20 10:32>





- Studies


Microbiology Data (last 24 hrs): 








07/20/20 12:02   Throat   Group A Streptococcus Rapid Screen - Final








<Freddie Geller - Last Filed: 07/21/20 14:33>





Assessment & Plan


Discharge Plan: Home


Plan to discharge in: Greater than 2 days





- Code Status/Comfort Care


Code Status Assessed: Yes (Patient is full code)


Physician Review Additional Text: 





Impression:


Acute kidney failure complicated by a history of lupus, chronic anemia and 

possible nephritis:


History of lupus:


Severe Anemia of chronic disease:


Congestive heart failure status post pericardial window in 2008:





Plan:


Acute kidney failure complicated by a history of lupus, chronic anemia and 

possible nephritis:  Patient noted to have a significantly elevated creatinine 

level of 9.5 on admission.  This is likely secondary to a history of lupus, 

chronic anemia and nephritis.  Patient has not seen a rheumatologist and few 

years.  States she has been taking prednisone 10 mg daily dose for greater than 

10 years.  Prescription given by PCP at the [facility].  UA showing 3+ 

protein and 1+ blood consistent with nephritis.  Patient already started on IV 

fluids.  Patient's creatinine has decreased to 8.8.  Will continue with IV 

fluids, nephrology will continue to follow patient.  Patient may need to have 

renal biopsy.  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 

outpatient basis but states that she never went back.  Patient need to follow up

with Nephrology and rheumatology at discharge.


History of lupus:  The patient was diagnosed with lupus in 2008. She was seeing 

a rheumatologist in [facility] but moved to [facility] and has not seen a 

rheumatologist in many years.  Patient will likely benefit following up with the

rheumatologist on discharge.


Severe Anemia of chronic disease:  Patient currently being transfused, upon unit

1 of 2. Will repeat H&H after 2nd unit.  Will continue to transfuse to a 

hemoglobin of 7-8.


Congestive heart failure status post pericardial window in 2008:  Patient states

that she was told she had congestive heart failure in 2008 when she was 

diagnosed with lupus.  States that at that time she was noted to have a 

pericardial effusion and underwent pericardial window which resolve her issue.  

On admission it is noted that her BNP is elevated at 8398.  Awaiting results 

from echocardiogram, Will consider cardiology consultation based on findings of 

echocardiogram.  Will place patient on telemetry and monitor.  Will give Lasix 

with blood transfusion.


Critical Care: No


Time Spent Managing Pts Care (In Minutes): 55





<Pj Marquez - Last Filed: 07/21/20 10:32>


Physician Review Additional Text: 





Patient was seen and examined


Agree with the above  assessment plan





<Freddie Geller - Last Filed: 07/21/20 14:33>

## 2020-07-21 NOTE — P.PN
Subjective


Date of Service: 07/21/20


Primary Care Provider: [facility]


Chief Complaint: Acute renal failure/lupus/anemia





Subjective 


Pt with Hx of SLE, pericardial effusion in 2008 S/P window, CKD had biopsy at 

age ~12 , was on cellcept and hydroxychloroquine 


pt had no blood w/u or Rhumatology and nephrology F/U for more than 6yrs


presented with symptomatic anemia 


Cr ~0.9 , HB <4.0 








Today 


no over nigth events 


plan for 2 PRBC 


Rhumatologist office contacted for old records


pending serology and US results 


will satrt epogen and PO bicarb 


if US showed echogenic kidneys and old records compatible with CKD then pt 

inlikely to benefit from biopsy and will need to start/prepare for HD this 

admission , otherwise will proceed with Biopy once Hb stable 











Physical exam 


general: AAOX3, NAD , obese


Neck; Supple, No elevated JVD 


hear: RRR, normal S1,2 no murmur or rub


Chest: CTAB, no rlaes or wheezes  


Abdomen: Soft , Nt 


Extremities No edema or ulcer











A/P 





TALI vs CKD V 


due to lupus nephritis 


pending US and serology 


renal dose meds 


if US showed echogenic kidneys and old records compatible with CKD then pt 

inlikely to benefit from biopsy and will need to start/prepare for HD this 

admission , otherwise will proceed with Biopsy once Hb stable 











Symptomatic anemia 


likely due to CKD and lupus 


transfuse to keep Hb >7.0 


will start epogen 











SLE 


hold cellcept due to anemia 


cont plaquinel 


F/U markers


will start on steroids if shows active disease








HAGMA 


will start po bicarb








total time spent 45 min








Physical Examination





- Vital Signs


Temperature: 99.4 F


Blood Pressure: 103/55


Pulse: 100


Respirations: 18


Pulse Ox (%): 99





- Physical Exam


Other Physical/Emotional Findings: Pleasant and cooperative





- Studies


Microbiology Data (last 24 hrs): 








07/20/20 12:02   Throat   Group A Streptococcus Rapid Screen - Final





Medications List Reviewed: Yes

## 2020-07-21 NOTE — RAD REPORT
EXAM DESCRIPTION:  US - Renal Ultrasound-Complete - 7/21/2020 6:09 pm

 

CLINICAL HISTORY:  arf

Flank pain

 

COMPARISON:  No comparisons

 

FINDINGS:  Both kidneys are echogenic.

 

The right kidney measures 11.8 x 5.3 x 4.9 cm. No hydronephrosis, focal mass or perinephric fluid.

 

The left kidney measures 10.5 x 6.1 x 5.2 cm. No hydronephrosis, focal mass or perinephric fluid.

 

The urinary bladder is incompletely distended without gross abnormality seen.

 

IMPRESSION:  Bilateral echogenic kidneys are present compatible with underlying medical renal disease
.

## 2020-07-22 LAB
ALBUMIN SERPL BCP-MCNC: 2.4 G/DL (ref 3.4–5)
ALP SERPL-CCNC: 61 U/L (ref 45–117)
ALT SERPL W P-5'-P-CCNC: 9 U/L (ref 12–78)
ANISOCYTOSIS BLD QL: (no result)
AST SERPL W P-5'-P-CCNC: 13 U/L (ref 15–37)
BLD SMEAR INTERP: (no result)
BUN BLD-MCNC: 76 MG/DL (ref 7–18)
CREAT UR-SCNC: 80 MG/DL (ref 20–320)
GLUCOSE SERPLBLD-MCNC: 181 MG/DL (ref 74–106)
HCT VFR BLD CALC: 14.4 % (ref 36–45)
LYMPHOCYTES # SPEC AUTO: 0.6 K/UL (ref 0.7–4.9)
MORPHOLOGY BLD-IMP: (no result)
PMV BLD: 8.5 FL (ref 7.6–11.3)
POLYCHROMASIA BLD QL SMEAR: (no result)
POTASSIUM SERPL-SCNC: 4.7 MMOL/L (ref 3.5–5.1)
PROT UR-MCNC: 252 MG/DL (ref ?–11.9)
RBC # BLD: 1.25 M/UL (ref 3.86–4.86)

## 2020-07-22 RX ADMIN — Medication SCH ML: at 08:17

## 2020-07-22 RX ADMIN — VENLAFAXINE SCH MG: 75 TABLET ORAL at 08:19

## 2020-07-22 RX ADMIN — SODIUM CHLORIDE SCH MLS: 0.45 INJECTION, SOLUTION INTRAVENOUS at 00:44

## 2020-07-22 RX ADMIN — SODIUM CHLORIDE SCH MLS: 0.45 INJECTION, SOLUTION INTRAVENOUS at 16:36

## 2020-07-22 RX ADMIN — Medication SCH: at 21:00

## 2020-07-22 RX ADMIN — IRON SUPPLEMENT SCH MG: 325 TABLET ORAL at 16:36

## 2020-07-22 RX ADMIN — IRON SUPPLEMENT SCH MG: 325 TABLET ORAL at 08:18

## 2020-07-22 RX ADMIN — IRON SUPPLEMENT SCH MG: 325 TABLET ORAL at 21:09

## 2020-07-22 RX ADMIN — HYDROXYCHLOROQUINE SULFATE SCH MG: 200 TABLET, FILM COATED ORAL at 08:18

## 2020-07-22 RX ADMIN — SODIUM BICARBONATE TAB 325 MG SCH MG: 325 TAB at 08:18

## 2020-07-22 RX ADMIN — SODIUM BICARBONATE TAB 325 MG SCH MG: 325 TAB at 21:09

## 2020-07-22 NOTE — PN
Date of Progress Note:  07/22/2020



Subjective:  The patient was admitted with renal failure manifesting as chronic 
disease.  The patient has background history of lupus.  Apparently, the patient 
had lupus nephritis, been treated on CellCept.  We still do not have the 
records, but ultrasound showing significant scarring on kidney.  Kidney function
did not improve on current plain hydration and steroid.  Serology is still 
pending.



Physical Examination:

Vital Signs:  Blood pressure 142/83, pulse of 77.  The patient had urine output 
of 1100. 

Chest:  Clear to auscultation. 

Heart:  S1, S2.  Regular.  Systolic murmur. 

Abdomen:  Soft, nontender. 

Extremities:  No edema. 

Neurologic:  Alert.  No focal.



Laboratory Data:  WBC 1.7, H and H 5.2/14.4, platelets 130.  Sodium 143, 
potassium 4.7, bicarb 18, chloride 114, BUN 76, creatinine of 9, calcium 7.5.  
Albumin 2.4, corrected calcium is 8.7.  Urinalysis; specific gravity of 1.020, 
PC ratio of 3.  Serology is still pending.  Renal ultrasound showing 11.8 x 10.5
with echogenicity.



Current Medications:  The patient on include;

1.   Cefazolin.

2.   Solu-Medrol.

3.   Epogen.

4.   Effexor.

5.   Tylenol.

6.   Lasix.

7.   Sodium bicarb 13 b.i.d.





Assessment And Plan:  

1.   Lupus nephritis.  I do not see the activity of the nephritis, but there is 
chronicity of the presentation.  The patient had acidosis, had elevation in BUN 
with mild uremic symptoms.  I had long discussion with the patient that with the
finding on her lab the patient mostly has chronic kidney disease currently and I
do not see activity on the UA to treat it as lupus nephritis right now, but the 
patient need to be initiated on renal replacement therapy.  We discussed the 
modality of the renal replacement therapy.  The patient given the financial 
status of her and being uninsured, unable to proceed with hemodialysis 
currently.  We will consult surgery to place PermCath and we will start the 
patient on hemodialysis as in-house.  I am going to go ahead and send for full 
serology to evaluate if there is any other systemic manifestation of her lupus 
giving the finding of leukopenia and thrombocytopenia and pancytopenia.

2.   Lupus, manifested with exacerbation, doubted to be nephritis with renal 
exacerbation.  I going to go ahead and send for the serology.  The patient is 
already started on Solu-Medrol.  I am going to send for LDH and haptoglobin to 
evaluate for any hemolysis.  I agree with the Solu-Medrol for the time being.

3.   Acidosis, non-anion gap metabolic acidosis, secondary to renal failure, 
questionable renal tubular acidosis, secondary to lupus.  We will start the 
patient on oral sodium bicarb.

4.   Hypocalcemia, mostly on the presence of secondary hyperparathyroidism.  We 
will send for PTH, we will send for vitamin D, and we will follow up.

5.   Hypertension, controlled, optimal.  We will continue to monitor.

6.   Anemia, not symptomatic.  We will transfuse gradually and we will follow 
up.  Differential is chronic kidney disease/hemolytic anemia on the presence of 
lupus.

7.   Thrombocytopenia, secondary to lupus.





MA/CRAIG

DD:  07/22/2020 15:01:02   Voice ID:  [id number]

DT:  07/22/2020 17:47:09   Report ID:  [id number]

HENOK

## 2020-07-22 NOTE — P.PN
Subjective


Date of Service: 07/22/20


Primary Care Provider: [facility]


Chief Complaint: Acute renal failure/lupus/anemia





Review of Systems


Unremarkable





Physical Examination





- Vital Signs


Temperature: 97.2 F


Blood Pressure: 148/79


Pulse: 72


Respirations: 18


Pulse Ox (%): 100





- Physical Exam


General: Alert, In no apparent distress


HEENT: Atraumatic, PERRLA, EOMI


Neck: Supple, JVD not distended


Respiratory: Clear to auscultation bilaterally, Normal air movement


Cardiovascular: Regular rate/rhythm, Normal S1 S2


Gastrointestinal: Normal bowel sounds, No tenderness


Musculoskeletal: No tenderness


Integumentary: No rashes


Neurological: Normal speech, Normal tone, Normal affect


Lymphatics: No axilla or inguinal lymphadenopathy


Other Physical/Emotional Findings: Pleasant and cooperative





- Studies


Microbiology Data (last 24 hrs): 








07/20/20 12:02   Throat   Culture & Sensitivity - Final


                            NORMAL UPPER RESPIRATORY YONAS GROWN.


07/20/20 11:29   Clean Catch Urine   [place] Count - Final


07/20/20 11:29   Clean Catch Urine    - Final


                            No growth.





Medications List Reviewed: Yes





Assessment & Plan


Discharge Plan: Home


Plan to discharge in: Greater than 2 days





- Code Status/Comfort Care


Code Status Assessed: Yes (full code)


Physician Review Additional Text: 





Impression:


Acute kidney failure complicated by a history of lupus, chronic anemia and 

possible nephritis:


History of lupus:


Severe Anemia of chronic disease:


Congestive heart failure status post pericardial window in 2008:





Plan:


Acute kidney failure complicated by a history of lupus, chronic anemia and 

possible nephritis:  Patient kidney function has not improved with hydration, 

nephrology is recommending dialysis, General Surgery consulted for placement of 

Tessio, most likely will be completed tomorrow morning. Will need to arrange for

outpatient HD. 


History of lupus:  The patient was diagnosed with lupus in 2008. She was seeing 

a rheumatologist in [facility] but moved to [facility] and has not seen a 

rheumatologist in many years.  Patient will likely benefit following up with the

rheumatologist on discharge.


Severe Anemia of chronic disease:  Patient given two units yesterday, HGB up to 

5. Will transfuse another unit today. 


Congestive heart failure status post pericardial window in 2008:  Patient states

that she was told she had congestive heart failure in 2008 when she was 

diagnosed with lupus.  States that at that time she was noted to have a 

pericardial effusion and underwent pericardial window which resolve her issue.  

On admission it is noted that her BNP is elevated at 8398.  Awaiting results 

from echocardiogram, Will consider cardiology consultation based on findings of 

echocardiogram.  Will place patient on telemetry and monitor.  Will give Lasix 

with blood transfusion.


Time Spent Managing Pts Care (In Minutes): 55

## 2020-07-22 NOTE — ECHO
HEIGHT: 4 ft 11 in   WEIGHT: 134 lb 3.2 oz   DATE OF STUDY: 2/21/2020   REFER DR: Patrick Stiles

2-DIMENSIONAL: YES

     M.MODE: YES

 DOPPLER: YES

COLOR FLOW: YES



                    TDS:  NO

PORTABLE: NO

 DEFINITY:  NO

BUBBLE STUDY: NO





DIAGNOSIS:  CONGESTIVE HEART FAILURE



CARDIAC HISTORY:  

CATHERIZATION: NO

SURGERY: NO

PROSTHETIC VALVE: NO

PACEMAKER: NO





MEASUREMENTS (cm)

    DIASTOLIC (NORMALS)                 SYSTOLIC (NORMALS)

IVSd                 1.0 (0.6-1.2)                    LA Diam 3.6 (1.9-4.0)     LVEF       
  66%  

LVIDd               3.4 (3.5-5.7)                        LVIDs      2.2 (2.0-3.5)     %FS  
        36%

LVPWd             1.0 (0.6-1.2)

Ao Diam           2.6 (2.0-3.7)



2 DIMENSIONAL ASSESSMENT:

RIGHT ATRIUM:                   NORMAL

LEFT ATRIUM:       NORMAL



RIGHT VENTRICLE:            NORMAL

LEFT VENTRICLE: NORMAL



TRICUSPID VALVE:             NORMAL

MITRAL VALVE:     NORMAL



PULMONIC VALVE:             NORMAL

AORTIC VALVE:     NORMAL



PERICARDIAL EFFUSION: NONE

AORTIC ROOT:      NORMAL





LEFT VENTRICULAR WALL MOTION:     NORMAL.



DOPPLER/COLOR FLOW:     TRACE OF TRICUSPID REGURGITATION.



COMMENTS:      TRACE OF TRICUSPID REGURGITATION. NORMAL LEFT VENTRICULAR SIZE AND 
FUNCTION. NO WALL MOTION ABNORMALITY.



TECHNOLOGIST:   ARIE JAMES

## 2020-07-23 LAB
ALBUMIN SERPL BCP-MCNC: 2.3 G/DL (ref 3.4–5)
ALP SERPL-CCNC: 50 U/L (ref 45–117)
ALT SERPL W P-5'-P-CCNC: 7 U/L (ref 12–78)
AST SERPL W P-5'-P-CCNC: 10 U/L (ref 15–37)
BUN BLD-MCNC: 77 MG/DL (ref 7–18)
FERRITIN SERPL-MCNC: 711.4 NG/ML (ref 8–388)
GLUCOSE SERPLBLD-MCNC: 124 MG/DL (ref 74–106)
HCT VFR BLD CALC: 11.5 % (ref 36–45)
IRON SERPL-MCNC: 95 UG/DL (ref 50–170)
LYMPHOCYTES # SPEC AUTO: 0.9 K/UL (ref 0.7–4.9)
PMV BLD: 8.3 FL (ref 7.6–11.3)
POTASSIUM SERPL-SCNC: 3.9 MMOL/L (ref 3.5–5.1)
RBC # BLD: 0.91 M/UL (ref 3.86–4.86)
TRANSFERRIN SERPL-MCNC: 149 MG/DL (ref 200–360)

## 2020-07-23 PROCEDURE — B5181ZA FLUOROSCOPY OF SUPERIOR VENA CAVA USING LOW OSMOLAR CONTRAST, GUIDANCE: ICD-10-PCS

## 2020-07-23 PROCEDURE — 02HV33Z INSERTION OF INFUSION DEVICE INTO SUPERIOR VENA CAVA, PERCUTANEOUS APPROACH: ICD-10-PCS

## 2020-07-23 RX ADMIN — SODIUM BICARBONATE TAB 325 MG SCH MG: 325 TAB at 20:38

## 2020-07-23 RX ADMIN — SODIUM BICARBONATE TAB 325 MG SCH MG: 325 TAB at 14:43

## 2020-07-23 RX ADMIN — HEPARIN SODIUM ONE UNIT: 5000 INJECTION, SOLUTION INTRAVENOUS; SUBCUTANEOUS at 11:10

## 2020-07-23 RX ADMIN — Medication SCH ML: at 09:00

## 2020-07-23 RX ADMIN — SODIUM CHLORIDE SCH: 0.45 INJECTION, SOLUTION INTRAVENOUS at 02:00

## 2020-07-23 RX ADMIN — Medication SCH ML: at 20:39

## 2020-07-23 RX ADMIN — HEPARIN SODIUM ONE UNIT: 1000 INJECTION, SOLUTION INTRAVENOUS; SUBCUTANEOUS at 09:15

## 2020-07-23 RX ADMIN — IRON SUPPLEMENT SCH: 325 TABLET ORAL at 09:00

## 2020-07-23 RX ADMIN — IRON SUPPLEMENT SCH: 325 TABLET ORAL at 21:00

## 2020-07-23 RX ADMIN — CALCITRIOL CAPSULES 0.25 MCG SCH: 0.25 CAPSULE ORAL at 10:00

## 2020-07-23 RX ADMIN — SODIUM CHLORIDE SCH: 0.45 INJECTION, SOLUTION INTRAVENOUS at 10:48

## 2020-07-23 RX ADMIN — LIDOCAINE HYDROCHLORIDE ONE ML: 10 INJECTION, SOLUTION EPIDURAL; INFILTRATION; INTRACAUDAL; PERINEURAL at 10:49

## 2020-07-23 RX ADMIN — EPOETIN ALFA-EPBX SCH UNIT: 4000 INJECTION, SOLUTION INTRAVENOUS; SUBCUTANEOUS at 20:38

## 2020-07-23 RX ADMIN — HEPARIN SODIUM ONE: 1000 INJECTION, SOLUTION INTRAVENOUS; SUBCUTANEOUS at 09:17

## 2020-07-23 RX ADMIN — SODIUM BICARBONATE TAB 325 MG SCH: 325 TAB at 21:00

## 2020-07-23 RX ADMIN — LIDOCAINE HYDROCHLORIDE ONE ML: 10 INJECTION, SOLUTION EPIDURAL; INFILTRATION; INTRACAUDAL; PERINEURAL at 09:15

## 2020-07-23 RX ADMIN — VENLAFAXINE SCH MG: 75 TABLET ORAL at 14:38

## 2020-07-23 RX ADMIN — SODIUM CHLORIDE ONE MLS: 9 INJECTION, SOLUTION INTRAMUSCULAR; INTRAVENOUS; SUBCUTANEOUS at 14:39

## 2020-07-23 RX ADMIN — IRON SUPPLEMENT SCH MG: 325 TABLET ORAL at 14:39

## 2020-07-23 RX ADMIN — SODIUM CHLORIDE SCH MLS: 0.45 INJECTION, SOLUTION INTRAVENOUS at 00:27

## 2020-07-23 RX ADMIN — IRON SUPPLEMENT SCH MG: 325 TABLET ORAL at 20:39

## 2020-07-23 RX ADMIN — HEPARIN SODIUM ONE UNIT: 5000 INJECTION, SOLUTION INTRAVENOUS; SUBCUTANEOUS at 09:16

## 2020-07-23 RX ADMIN — HEPARIN SODIUM ONE UNIT: 5000 INJECTION, SOLUTION INTRAVENOUS; SUBCUTANEOUS at 10:07

## 2020-07-23 RX ADMIN — SODIUM CHLORIDE ONE MLS: 9 INJECTION, SOLUTION INTRAMUSCULAR; INTRAVENOUS; SUBCUTANEOUS at 14:40

## 2020-07-23 RX ADMIN — ONDANSETRON PRN MG: 2 INJECTION INTRAMUSCULAR; INTRAVENOUS at 18:30

## 2020-07-23 RX ADMIN — HYDROXYCHLOROQUINE SULFATE SCH MG: 200 TABLET, FILM COATED ORAL at 14:42

## 2020-07-23 NOTE — P.PN
Subjective


Date of Service: 07/23/20


Primary Care Provider: [facility]


Chief Complaint: Acute renal failure/lupus/anemia


Subjective: No new changes





<Juan MiguellarsPj - Last Filed: 07/23/20 08:09>


Date of Service: 07/23/20





<YimiFreddie Harringtonaskar - Last Filed: 07/23/20 13:50>





Review of Systems


Unremarkable





<Pj Marquez - Last Filed: 07/23/20 08:09>





Physical Examination





- Vital Signs


Temperature: 97.7 F


Blood Pressure: 134/82


Pulse: 96


Respirations: 18


Pulse Ox (%): 99





- Physical Exam


General: Alert, In no apparent distress


HEENT: Atraumatic, PERRLA, EOMI


Neck: Supple, JVD not distended


Respiratory: Clear to auscultation bilaterally, Normal air movement


Cardiovascular: Regular rate/rhythm, Normal S1 S2


Gastrointestinal: Normal bowel sounds, No tenderness


Musculoskeletal: No tenderness


Integumentary: No rashes


Neurological: Normal speech, Normal tone, Normal affect


Lymphatics: No axilla or inguinal lymphadenopathy


Other Physical/Emotional Findings: Pleasant and cooperative





- Studies


Microbiology Data (last 24 hrs): 








07/20/20 12:02   Throat   Culture & Sensitivity - Final


                            NORMAL UPPER RESPIRATORY YONAS GROWN.


07/20/20 11:29   Clean Catch Urine   [place] Count - Final


07/20/20 11:29   Clean Catch Urine    - Final


                            No growth.





Medications List Reviewed: Yes





<Juan MiguellarsPj - Last Filed: 07/23/20 08:09>





Assessment & Plan


Discharge Plan: Home


Plan to discharge in: Greater than 2 days





- Code Status/Comfort Care


Code Status Assessed: Yes (Patient is full code)


Physician Review Additional Text: 





Impression:


Acute kidney failure complicated by a history of lupus, chronic anemia and possi

ble nephritis:


History of lupus:


Severe Anemia of chronic disease:


Congestive heart failure status post pericardial window in 2008:





Plan:


Acute kidney failure complicated by a history of lupus, chronic anemia and 

possible nephritis:  Patient kidney function has not improved with hydration, 

nephrology is recommending dialysis, General Surgery consulted for placement of 

Tessio, today's patient's hemoglobin is 4.4, patient with antibodies which is 

delaying transfusion.  Will discuss with General Surgery, Tessio insertion may 

need to be delayed.


History of lupus:  The patient was diagnosed with lupus in 2008. She was seeing 

a rheumatologist in [facility] but moved to [facility] and has not seen a 

rheumatologist in many years.  Patient will likely benefit following up with the

rheumatologist on discharge.


Severe Anemia of chronic disease:  Patient given two units yesterday, HGB up to 

5.  Unable to transfuse yesterday as patient as many antibodies, will attempt to

transfuse today. 


Congestive heart failure status post pericardial window in 2008:  Patient states

that she was told she had congestive heart failure in 2008 when she was 

diagnosed with lupus.  States that at that time she was noted to have a 

pericardial effusion and underwent pericardial window which resolve her issue.  

On admission it is noted that her BNP is elevated at 8398.  Awaiting results 

from echocardiogram, Will consider cardiology consultation based on findings of 

echocardiogram.  Will place patient on telemetry and monitor.  Will give Lasix 

with blood transfusion.


Time Spent Managing Pts Care (In Minutes): 55





<Pj Marquez - Last Filed: 07/23/20 08:09>


Physician Review Additional Text: 





Patient was seen and examined and findings were discussed 


agree with the assessment and plan as documented by YSABEL 





<Freddie Geller - Last Filed: 07/23/20 13:50>

## 2020-07-23 NOTE — RAD REPORT
EXAM DESCRIPTION:  RAD - Fluoroscopy <1 Hour - 7/23/2020 11:24 am

 

CLINICAL HISTORY:  Venous catheter insertion.

TESSIO CATH PLACEMENT

 

COMPARISON:  No comparisons

 

FINDINGS:  Fluoroscopic imaging is submitted from placement of a venous catheter.  Details of the pro
cedure not available.

 

Fluoroscopy time: 0.1 minutes

## 2020-07-23 NOTE — RAD REPORT
EXAM DESCRIPTION:  RAD - Chest Single View - 7/23/2020 12:02 pm

 

CLINICAL HISTORY:  S/P Tesio

Chest pain.

 

COMPARISON:  Chest Single View dated 7/20/2020

 

FINDINGS:  Portable technique limits examination quality.

 

Right-sided venous catheter has tip in the SVC. No postprocedure pneumothorax. Mild interstitial pulm
onary edema with small pleural effusions. The heart is moderately enlarged.

 

IMPRESSION:  No postprocedure pneumothorax.

## 2020-07-23 NOTE — PREOPCON
Date of Consultation:  07/22/2020



Reason:  The patient needs dialysis.



History Of Present Illness:  The patient is a 25-year-old female, who came in with significant anemia
 with hemoglobin around 3, complaining of shortness of breath, sore throat, low-grade fever, which st
arted about a month ago and has progressed.  She has lupus.  She has been on steroid therapy and on h
er workup she was found to have significant uremia and requiring dialysis soon.  So, I was consulted 
for catheter placement.  She is awake, alert.  No complaints.  No fever or chills currently.



Review of Systems:

Otherwise unremarkable.



Past Medical History:  Significant for lupus, congestive heart failure, status post pericardial windo
w in 2008, acute renal failure and anemia.



Past Surgical History:  Right kidney biopsy, pericardial window.



Allergies:  PENICILLIN.



Social History:  The patient does not smoke or drink.



Family History:  Noncontributory.



Physical Examination:

Vital Signs:  Stable, afebrile. 

General:  Awake, alert and oriented x3. 

Head and Neck:  No masses. 

Chest:  Clear. 

Heart:  S1 and S2. 

Abdomen:  Soft. 

Extremities:  Neurovascularly intact. 

Neuro:  Nonfocal.



Laboratory Data:  Reviewed.  Her white count is 5.3 today.  H and H is 4.4 and 11.5.  She is currentl
y receiving blood.  Her retic count is 10.18.  Platelets are 118.  INR is within normal limits.  BUN 
is 77, creatinine is 8.61.



Assessment:  Acute renal failure.  The patient with complicated medical history including lupus and s
ignificant anemia.



Recommendations:  We will place a Tesio catheter.  The patient understands the risks, benefits, and a
lternatives and agrees to procedure.





AS/MODL

DD:  07/23/2020 10:20:46Voice ID:  [id number]

DT:  07/23/2020 10:45:08Report ID:  [id number]

## 2020-07-23 NOTE — PN
Date of Progress Note:  07/23/2020



Subjective:  The patient was admitted with uremic symptoms, elevated BUN and creatinine.  The patient
 was started on prednisone.  The patient was severely anemic, received transfusion.  After the predni
sone started, the pancytopenia started improving.



Physical Examination:

Vital Signs:  When I saw the patient, blood pressure 134/82, pulse of 96, afebrile.  The patient had 
good urine output of 1100. 

Chest:  Clear to auscultation. 

Heart:  S1, S2.  Regular.  No pericardial rub. 

Abdomen:  Soft, nontender.  No organomegaly. 

Extremities:  No edema. 

Neurologic:  Alert.  No tremor.



Laboratory Data:  WBC 5.3, H and H 4.4/11.5, platelets 118.  Sodium 141, potassium 3.9, bicarb 22, BU
N 77, creatinine 8.6, GFR of 7, calcium 7.1, phos 5.2.  Iron saturation 45, ferritin 711, .  S
george protein electrophoresis is still pending.  P/C ratio is 3.5.  Serology still pending.  COVID was
 negative.  .



Assessment And Plan:  

1.Chronic kidney disease.  Its look to me with the finding on the PTH and ultrasound that in support
 of chronic kidney disease, advanced, progression to end-stage renal disease.  The patient will need 
to initiate dialysis.  The patient planned for PermCath today, then after that she is going to be sta
rted dialysis.  We informed the  that the patient needs placement as outpatient for dial
ysis.  We will dialyze the patient today and tomorrow.  Then, hopefully we can switch her to TTS.

2.We will follow up the serology marker to evaluate if the patient down in the road will benefit fro
m kidney biopsy or not if there is any activity.

3.Lupus/lupus nephritis.  I do not see any activity on the urine as bland urine, but the patient has
 proteinuria with nephrotic range of proteinuria.  The patient will benefit from staging of the kidne
y and the patient is going to need for further workup.  We sent all the serology to evaluate the acti
vity of the lupus.  If it is involving other organ, for the time being I am going to go ahead and sta
rt the patient on Plaquenil 200 daily and continue the prednisone.

4.We will follow up haptoglobin.  LDH has been elevated, which is in favor of hemolysis, which can e
xplain other activity of her lupus.

5.Pancytopenia as above.  The patient has a possibility of hemolysis secondary to lupus activity.  T
he patient already started on prednisone.  The patient is status post transfusion.  We will start the
 patient on Plaquenil and we will follow up serology.

6.Anemia, multifactorial, chronic kidney disease and hemolysis.  Started on prednisone and Retacrit.


7.Acidosis, recovered.  Going to be corrected completely with dialysis.  I am going to go ahead and 
discontinue bicarb drip and continue oral bicarb which will be discontinued when we start the patient
 on dialysis.

8.Secondary hyperparathyroidism.  We will start the patient on calcitriol.  We will follow up.  I do
 not see the need for any binder for the time being.

9.Hypocalcemia secondary to secondary hyperparathyroidism.  Corrected calcium is 8.  I do not see th
e need for calcium supplements.





INES

DD:  07/23/2020 10:56:53Voice ID:  [id number]

DT:  07/23/2020 11:22:53Report ID:  [id number]

## 2020-07-23 NOTE — P.OP
Preoperative diagnosis: ARF


Postoperative diagnosis: ARF


Primary procedure: RIJ Tesio


Secondary procedure: Fluoroscopy


Anesthesia: General


Estimated blood loss: min


Specimen: none


Findings: normal anatomy


Complications: None


Transferred to: Recovery Room


Condition: Good

## 2020-07-24 LAB
ALBUMIN SERPL BCP-MCNC: 2.5 G/DL (ref 3.4–5)
ALP SERPL-CCNC: 59 U/L (ref 45–117)
ALT SERPL W P-5'-P-CCNC: 9 U/L (ref 12–78)
AST SERPL W P-5'-P-CCNC: 16 U/L (ref 15–37)
BUN BLD-MCNC: 53 MG/DL (ref 7–18)
GLUCOSE SERPLBLD-MCNC: 87 MG/DL (ref 74–106)
HCT VFR BLD CALC: 26 % (ref 36–45)
LYMPHOCYTES # SPEC AUTO: 1.1 K/UL (ref 0.7–4.9)
PMV BLD: 8.3 FL (ref 7.6–11.3)
POTASSIUM SERPL-SCNC: 3.7 MMOL/L (ref 3.5–5.1)
RBC # BLD: 2.43 M/UL (ref 3.86–4.86)

## 2020-07-24 PROCEDURE — 5A1D70Z PERFORMANCE OF URINARY FILTRATION, INTERMITTENT, LESS THAN 6 HOURS PER DAY: ICD-10-PCS

## 2020-07-24 RX ADMIN — VENLAFAXINE SCH MG: 75 TABLET ORAL at 08:59

## 2020-07-24 RX ADMIN — EPOETIN ALFA-EPBX SCH UNIT: 4000 INJECTION, SOLUTION INTRAVENOUS; SUBCUTANEOUS at 12:01

## 2020-07-24 RX ADMIN — HYDROXYCHLOROQUINE SULFATE SCH MG: 200 TABLET, FILM COATED ORAL at 08:59

## 2020-07-24 RX ADMIN — IRON SUPPLEMENT SCH: 325 TABLET ORAL at 13:58

## 2020-07-24 RX ADMIN — SODIUM BICARBONATE TAB 325 MG SCH MG: 325 TAB at 08:59

## 2020-07-24 RX ADMIN — ONDANSETRON PRN MG: 2 INJECTION INTRAMUSCULAR; INTRAVENOUS at 00:25

## 2020-07-24 RX ADMIN — HYDRALAZINE HYDROCHLORIDE PRN MG: 20 INJECTION INTRAMUSCULAR; INTRAVENOUS at 12:14

## 2020-07-24 RX ADMIN — SODIUM CHLORIDE SCH MG: 0.9 INJECTION, SOLUTION INTRAVENOUS at 09:59

## 2020-07-24 RX ADMIN — Medication SCH ML: at 09:00

## 2020-07-24 RX ADMIN — ACETAMINOPHEN PRN MG: 500 TABLET, FILM COATED ORAL at 08:59

## 2020-07-24 RX ADMIN — ACETAMINOPHEN PRN MG: 500 TABLET, FILM COATED ORAL at 00:25

## 2020-07-24 RX ADMIN — IRON SUPPLEMENT SCH MG: 325 TABLET ORAL at 08:59

## 2020-07-24 RX ADMIN — Medication SCH ML: at 20:39

## 2020-07-24 RX ADMIN — IRON SUPPLEMENT SCH MG: 325 TABLET ORAL at 20:38

## 2020-07-24 NOTE — P.PN
Subjective


Date of Service: 07/24/20


Primary Care Provider: [facility]


Chief Complaint: Acute renal failure/lupus/anemia


Subjective: Improving





<Pj Marquez - Last Filed: 07/24/20 10:31>


Date of Service: 07/24/20





<YimiFreddieaskar - Last Filed: 07/24/20 12:27>





Review of Systems


Unremarkable





<Pj Marquez - Last Filed: 07/24/20 10:31>





Physical Examination





- Vital Signs


Temperature: 97.6 F


Blood Pressure: 160/85


Pulse: 73


Respirations: 18


Pulse Ox (%): 97





- Physical Exam


General: Alert, In no apparent distress


HEENT: Atraumatic, PERRLA, EOMI


Neck: Supple, JVD not distended


Respiratory: Clear to auscultation bilaterally, Normal air movement


Cardiovascular: Regular rate/rhythm, Normal S1 S2


Gastrointestinal: Normal bowel sounds, No tenderness


Musculoskeletal: No tenderness


Integumentary: No rashes


Neurological: Normal speech, Normal tone, Normal affect


Lymphatics: No axilla or inguinal lymphadenopathy


Other Physical/Emotional Findings: Pleasant and cooperative





- Studies


Medications List Reviewed: Yes





<Pj Marquez - Last Filed: 07/24/20 10:31>





Assessment & Plan


Discharge Plan: Home


Plan to discharge in: Greater than 2 days





- Code Status/Comfort Care


Code Status Assessed: Yes


Physician Review Additional Text: 





Impression:


Acute kidney failure complicated by a history of lupus, chronic anemia and 

possible nephritis:


History of lupus:


Severe Anemia of chronic disease:


Congestive heart failure status post pericardial window in 2008:





Plan:


Acute kidney failure complicated by a history of lupus, chronic anemia and 

possible nephritis:  Tessio was inserted yesterday, patient completed dialysis 

yesterday.  No complications.  Hemoglobin has improved significantly.  Patient 

now waiting outpatient dialysis chair placement.  This will likely not be placed

until Monday.  Will continue to monitor closely.


History of lupus:  The patient was diagnosed with lupus in 2008. She was seeing 

a rheumatologist in [facility] but moved to [facility] and has not seen a 

rheumatologist in many years.  Patient will likely benefit following up with the

rheumatologist on discharge.


Severe Anemia of chronic disease:  Hemoglobin up to 9, significant improvement. 

Will continue to closely monitor.  Patient reports that she had a dark stool 

yesterday, occult blood test ordered, await results.


Congestive heart failure status post pericardial window in 2008:  Patient states

that she was told she had congestive heart failure in 2008 when she was 

diagnosed with lupus.  States that at that time she was noted to have a 

pericardial effusion and underwent pericardial window which resolve her issue.  

On admission it is noted that her BNP is elevated at 8398.  Awaiting results 

from echocardiogram, Will consider cardiology consultation based on findings of 

echocardiogram.  Will place patient on telemetry and monitor.  Will give Lasix 

with blood transfusion.


Critical Care: No


Time Spent Managing Pts Care (In Minutes): 55





<Pj Marquez - Last Filed: 07/24/20 10:31>


Physician Review Additional Text: 





Patient was seen and examined and findings were discussed


Hemoglobin improved


Started on dialysis


Appreciate help from the consultants


Agree with the assessment and plan as documented by the YSABEL 





<Freddie Geller - Last Filed: 07/24/20 12:27>

## 2020-07-24 NOTE — P.PN
Subjective


Date of Service: 07/24/20


Primary Care Provider: [facility]


Chief Complaint: Acute renal failure/lupus/anemia





Subjective 


Pt with Hx of SLE, pericardial effusion in 2008 S/P window, CKD had biopsy at 

age ~12 , was on cellcept and hydroxychloroquine 


pt had no blood w/u or Rhumatology and nephrology F/U for more than 6yrs


presented with symptomatic anemia 


Cr ~9.0 , HB <4.0 








Today 


Pt have nausea and vomiting , started on antiemetic 


HD today and tomorrow 


will cont prednisione 


Op dialysis arrangement 


pending Complements and Anti-DS results 











Physical exam 


general: AAOX3, NAD , obese


Neck; Supple, No elevated JVD 


hear: RRR, normal S1,2 no murmur or rub


Chest: CTAB, no rlaes or wheezes  


Abdomen: Soft , Nt 


Extremities No edema or ulcer











A/P 





ESRD  


due to lupus nephritis 


US with echogenic kidneys 


renal dose meds 


started on HD 








Symptomatic anemia 


likely due to CKD and lupus 


transfuse to keep Hb >7.0 


cont epogen 


F/U on inflammatory markers 


LDH mildly elevated 


High retic count , f/u haptoglobulin 





SLE 


hold cellcept due to anemia 


cont plaquinel 


F/U markers


cont prednsione 


if inflammatory markers 








HAGMA 


will correct with HD 








total time spent 45 min








Physical Examination





- Vital Signs


Temperature: 97.6 F


Blood Pressure: 160/85


Pulse: 73


Respirations: 18


Pulse Ox (%): 97





- Physical Exam


Other Physical/Emotional Findings: Pleasant and cooperative





- Studies


Medications List Reviewed: Yes

## 2020-07-25 LAB
ALBUMIN SERPL BCP-MCNC: 2.4 G/DL (ref 3.4–5)
ALP SERPL-CCNC: 59 U/L (ref 45–117)
ALT SERPL W P-5'-P-CCNC: 9 U/L (ref 12–78)
ANISOCYTOSIS BLD QL: (no result)
AST SERPL W P-5'-P-CCNC: 15 U/L (ref 15–37)
BASO STIPL BLD QL SMEAR: (no result)
BLD SMEAR INTERP: (no result)
BUN BLD-MCNC: 34 MG/DL (ref 7–18)
GLUCOSE SERPLBLD-MCNC: 76 MG/DL (ref 74–106)
HCT VFR BLD CALC: 28.2 % (ref 36–45)
LYMPHOCYTES # SPEC AUTO: 1 K/UL (ref 0.7–4.9)
MACROCYTES BLD QL: (no result)
MORPHOLOGY BLD-IMP: (no result)
PMV BLD: 8.4 FL (ref 7.6–11.3)
POTASSIUM SERPL-SCNC: 3.8 MMOL/L (ref 3.5–5.1)
RBC # BLD: 2.68 M/UL (ref 3.86–4.86)

## 2020-07-25 RX ADMIN — EPOETIN ALFA-EPBX SCH UNIT: 4000 INJECTION, SOLUTION INTRAVENOUS; SUBCUTANEOUS at 21:36

## 2020-07-25 RX ADMIN — VENLAFAXINE SCH: 75 TABLET ORAL at 09:00

## 2020-07-25 RX ADMIN — HYDROXYCHLOROQUINE SULFATE SCH MG: 200 TABLET, FILM COATED ORAL at 09:32

## 2020-07-25 RX ADMIN — ACETAMINOPHEN PRN MG: 500 TABLET, FILM COATED ORAL at 21:34

## 2020-07-25 RX ADMIN — IRON SUPPLEMENT SCH MG: 325 TABLET ORAL at 21:36

## 2020-07-25 RX ADMIN — Medication SCH ML: at 09:00

## 2020-07-25 RX ADMIN — ACETAMINOPHEN PRN MG: 500 TABLET, FILM COATED ORAL at 09:33

## 2020-07-25 RX ADMIN — IRON SUPPLEMENT SCH MG: 325 TABLET ORAL at 13:25

## 2020-07-25 RX ADMIN — SODIUM CHLORIDE SCH MG: 0.9 INJECTION, SOLUTION INTRAVENOUS at 09:33

## 2020-07-25 RX ADMIN — Medication SCH ML: at 21:37

## 2020-07-25 RX ADMIN — IRON SUPPLEMENT SCH MG: 325 TABLET ORAL at 09:32

## 2020-07-25 RX ADMIN — CALCITRIOL CAPSULES 0.25 MCG SCH MCG: 0.25 CAPSULE ORAL at 09:32

## 2020-07-25 NOTE — P.PN
Subjective


Date of Service: 07/25/20


Primary Care Provider: [facility]


Chief Complaint: Acute renal failure/lupus/anemia


Subjective: No new changes, Improving





<Pj Marquez - Last Filed: 07/25/20 08:52>


Date of Service: 07/25/20





<YimiFreddieaskar - Last Filed: 07/25/20 12:58>





Review of Systems


Unremarkable





<Pj Marquez - Last Filed: 07/25/20 08:52>





Physical Examination





- Vital Signs


Temperature: 98.9 F


Blood Pressure: 149/83


Pulse: 76


Respirations: 18


Pulse Ox (%): 96





- Physical Exam


General: Alert, In no apparent distress, Oriented x3


HEENT: Atraumatic, Normocephalic, Mucous membr. moist/pink


Neck: Supple


Respiratory: Clear to auscultation bilaterally, Normal air movement


Cardiovascular: Normal pulses, Regular rate/rhythm, Normal S1 S2


Capillary refill: <2 Seconds


Gastrointestinal: Normal bowel sounds, Soft and benign


Musculoskeletal: No contractures, No erythema, No tenderness


Integumentary: No significant lesion, No tenderness/swelling, No erythema


Neurological: Normal gait, Normal speech, Normal strength at 5/5 x4 extr, Normal

tone


Urinary: Dialysis catheter


Other Physical/Emotional Findings: Pleasant and cooperative





- Studies


Medications List Reviewed: Yes





<Pj Marquez - Last Filed: 07/25/20 08:52>





Assessment & Plan


Discharge Plan: Home


Plan to discharge in: Greater than 2 days





- Code Status/Comfort Care


Code Status Assessed: Yes


Physician Review Additional Text: 





Impression:


Acute kidney failure complicated by a history of lupus, chronic anemia and 

possible nephritis:


History of lupus:


Severe Anemia of chronic disease:


Congestive heart failure status post pericardial window in 2008:





Plan:


Acute kidney failure complicated by a history of lupus, chronic anemia and 

possible nephritis:  Patient had dialysis the last 2 days.  Patient being 

followed closely by nephrology, current arrangements are for patient to receive 

outpatient hemodialysis.  This will likely not take place until Monday.


History of lupus:  The patient was diagnosed with lupus in 2008. She was seeing 

a rheumatologist in [facility] but moved to [facility] and has not seen a 

rheumatologist in many years.  Patient will likely benefit following up with the

rheumatologist on discharge.


Severe Anemia of chronic disease:  Hemoglobin up to 9, significant improvement. 

Will continue to closely monitor.  Patient reports that she had a dark stool 

yesterday, occult blood test ordered, await results.


History of Congestive heart failure status post pericardial window in 2008:  

Current echocardiogram shows ejection fraction of 66%.  Recommend follow up with

Cardiology on outpatient basis for 


Critical Care: No


Time Spent Managing Pts Care (In Minutes): 55





<Pj Marquez - Last Filed: 07/25/20 08:52>


Physician Review Additional Text: 





Agree with the assessment and Plan as documented by YSABEL 





<Freddie Geller - Last Filed: 07/25/20 12:58>

## 2020-07-25 NOTE — P.PN
Subjective


Date of Service: 07/26/20


Primary Care Provider: [facility]


Chief Complaint: Acute renal failure/lupus/anemia





Subjective 


Pt with Hx of SLE, pericardial effusion in 2008 S/P window, CKD had biopsy at 

age ~12 , was on cellcept and hydroxychloroquine 


pt had no blood w/u or Rhumatology and nephrology F/U for more than 6yrs


presented with symptomatic anemia 


Cr ~9.0 , HB <4.0 








Today 


nausea and vomiting resolved 


HD Monday 


will increase  prednisione 


Op dialysis arrangement 


pending Complements and Anti-DS results 











Physical exam 


general: AAOX3, NAD , obese


Neck; Supple, No elevated JVD 


hear: RRR, normal S1,2 no murmur or rub


Chest: CTAB, no rlaes or wheezes  


Abdomen: Soft , Nt 


Extremities No edema or ulcer











A/P 





ESRD  


due to lupus nephritis 


US with echogenic kidneys 


renal dose meds 


started on HD 








Symptomatic anemia 


likely due to CKD and lupus 


transfuse to keep Hb >7.0 


cont epogen 


F/U on inflammatory markers 


LDH mildly elevated 


High retic count , f/u haptoglobulin 





SLE 


off  cellcept due to anemia 


cont plaquinel 


F/U markers


cont prednsione 


if inflammatory markers 








=








total time spent 45 min








Physical Examination





- Vital Signs


Temperature: 98.9 F


Blood Pressure: 149/83


Pulse: 76


Respirations: 18


Pulse Ox (%): 96





- Physical Exam


Other Physical/Emotional Findings: Pleasant and cooperative





- Studies


Medications List Reviewed: Yes

## 2020-07-26 LAB
ALBUMIN SERPL BCP-MCNC: 2.6 G/DL (ref 3.4–5)
BUN BLD-MCNC: 48 MG/DL (ref 7–18)
GLUCOSE SERPLBLD-MCNC: 86 MG/DL (ref 74–106)
HCT VFR BLD CALC: 29.2 % (ref 36–45)
LYMPHOCYTES # SPEC AUTO: 1.2 K/UL (ref 0.7–4.9)
MAGNESIUM SERPL-MCNC: 2.1 MG/DL (ref 1.8–2.4)
PMV BLD: 8.9 FL (ref 7.6–11.3)
POTASSIUM SERPL-SCNC: 3.7 MMOL/L (ref 3.5–5.1)
RBC # BLD: 2.77 M/UL (ref 3.86–4.86)

## 2020-07-26 RX ADMIN — HYDRALAZINE HYDROCHLORIDE PRN MG: 20 INJECTION INTRAMUSCULAR; INTRAVENOUS at 16:59

## 2020-07-26 RX ADMIN — CALCITRIOL CAPSULES 0.25 MCG SCH MCG: 0.25 CAPSULE ORAL at 09:30

## 2020-07-26 RX ADMIN — HYDROXYCHLOROQUINE SULFATE SCH MG: 200 TABLET, FILM COATED ORAL at 09:30

## 2020-07-26 RX ADMIN — Medication SCH ML: at 20:39

## 2020-07-26 RX ADMIN — Medication SCH ML: at 09:00

## 2020-07-26 RX ADMIN — SODIUM CHLORIDE SCH MG: 0.9 INJECTION, SOLUTION INTRAVENOUS at 09:31

## 2020-07-26 RX ADMIN — IRON SUPPLEMENT SCH MG: 325 TABLET ORAL at 20:37

## 2020-07-26 RX ADMIN — IRON SUPPLEMENT SCH MG: 325 TABLET ORAL at 09:30

## 2020-07-26 RX ADMIN — ACETAMINOPHEN PRN MG: 500 TABLET, FILM COATED ORAL at 02:24

## 2020-07-26 RX ADMIN — IRON SUPPLEMENT SCH MG: 325 TABLET ORAL at 14:00

## 2020-07-26 RX ADMIN — VENLAFAXINE SCH: 75 TABLET ORAL at 09:00

## 2020-07-26 NOTE — P.PN
Subjective


Date of Service: 07/26/20


Primary Care Provider: [facility]


Chief Complaint: Acute renal failure/lupus/anemia


Subjective: C/O voiced (Patient with lower lip swelling, appears to be 

angioedema.)





<AlmaPj - Last Filed: 07/26/20 08:42>


Date of Service: 07/26/20





<YimiFreddie Malachi - Last Filed: 07/26/20 11:15>





Review of Systems


10-point ROS is otherwise unremarkable


ENT: Other (Lower lip swelling)





<Pj Marquez - Last Filed: 07/26/20 08:42>





Physical Examination





- Vital Signs


Temperature: 98.9 F


Blood Pressure: 149/83


Pulse: 76


Respirations: 18


Pulse Ox (%): 96





- Physical Exam


General: Alert, In no apparent distress, Oriented x3


HEENT: Atraumatic, Normocephalic, Other (Swelling of the lower lip present)


Neck: Supple, JVD not distended


Respiratory: Clear to auscultation bilaterally, Normal air movement


Cardiovascular: Regular rate/rhythm, Normal S1 S2


Gastrointestinal: Normal bowel sounds, No tenderness


Musculoskeletal: No tenderness


Integumentary: No rashes


Neurological: Normal speech, Normal tone, Normal affect


Other Physical/Emotional Findings: Pleasant and cooperative





- Studies


Medications List Reviewed: Yes





<Pj Marquez - Last Filed: 07/26/20 08:42>





Assessment & Plan


Discharge Plan: Home


Plan to discharge in: Greater than 2 days





- Code Status/Comfort Care


Code Status Assessed: Yes (Patient is full code)


Physician Review Additional Text: 





Impression:


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


History of lupus:


Severe Anemia of chronic disease-resolved


Congestive heart failure status post pericardial window in 2008:





Plan:


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 

with nephrology recommendations.  Patient awaiting outpatient dialysis chair.  

Further workup still taking place.  Patient complained of lower lip swelling 

that began last night after the injection of medication.  Will review medication

and hold.  Will discuss further with nephrology.


History of lupus:  Patient need to follow up with rheumatology on outpatient 

basis after discharge.


Severe Anemia of chronic disease-resolved:  Hemoglobin up to 9, significant 

improvement.  Will continue to closely monitor.  Patient reports that she had a 

dark stool 2 days ago, occult blood test ordered, await results.  Platelet count

is down to 94 today, will discuss this with nephrology as well. 


History of Congestive heart failure status post pericardial window in 2008:  

Current echocardiogram shows ejection fraction of 66%.  Recommend follow up with

Cardiology on outpatient basis for  


Critical Care: No


Time Spent Managing Pts Care (In Minutes): 55





<Pj Marquez - Last Filed: 07/26/20 08:42>


Physician Review Additional Text: 





Patient seen and examined and findings were discussed


Has swelling on the lower lids


Benadryl was given


Will add on a short course of steroid


Monitor closely


Agree with the assessment and plan as documented by the YSABEL





<Freddie Geller - Last Filed: 07/26/20 11:15>

## 2020-07-26 NOTE — P.PN
Subjective


Date of Service: 07/26/20


Primary Care Provider: [facility]


Chief Complaint: Acute renal failure/lupus/anemia





Subjective 


Pt with Hx of SLE, pericardial effusion in 2008 S/P window, CKD had biopsy at 

age ~12 , was on cellcept and hydroxychloroquine 


pt had no blood w/u or Rhumatology and nephrology F/U for more than 6yrs


presented with symptomatic anemia 


Cr ~9.0 , HB <4.0 








Today 


asymptomatic 


Low C3 and C4, will cont on steroids 


HD Monday 


Op dialysis arrangement 


pending  Anti-DS results 











Physical exam 


general: AAOX3, NAD , obese


Neck; Supple, No elevated JVD 


hear: RRR, normal S1,2 no murmur or rub


Chest: CTAB, no rlaes or wheezes  


Abdomen: Soft , Nt 


Extremities No edema or ulcer











A/P 





ESRD  


due to lupus nephritis 


US with echogenic kidneys 


renal dose meds 


started on HD 








Symptomatic anemia 


likely due to CKD and lupus 


transfuse to keep Hb >7.0 


cont epogen 








SLE 


off  cellcept due to anemia 


cont plaquinel 


Low C3,C4 


cont prednsione 








total time spent 45 min








Physical Examination





- Vital Signs


Temperature: 98.9 F


Blood Pressure: 149/83


Pulse: 76


Respirations: 18


Pulse Ox (%): 96





- Physical Exam


Other Physical/Emotional Findings: Pleasant and cooperative





- Studies


Medications List Reviewed: Yes

## 2020-07-27 LAB
ALBUMIN SERPL BCP-MCNC: 2.8 G/DL (ref 3.4–5)
BUN BLD-MCNC: 55 MG/DL (ref 7–18)
GLUCOSE SERPLBLD-MCNC: 98 MG/DL (ref 74–106)
HCT VFR BLD CALC: 33.1 % (ref 36–45)
LYMPHOCYTES # SPEC AUTO: 1.2 K/UL (ref 0.7–4.9)
PMV BLD: 8.3 FL (ref 7.6–11.3)
POTASSIUM SERPL-SCNC: 3.8 MMOL/L (ref 3.5–5.1)
RBC # BLD: 3.12 M/UL (ref 3.86–4.86)

## 2020-07-27 RX ADMIN — Medication SCH: at 09:00

## 2020-07-27 RX ADMIN — ACETAMINOPHEN PRN MG: 500 TABLET, FILM COATED ORAL at 06:03

## 2020-07-27 RX ADMIN — VENLAFAXINE SCH: 75 TABLET ORAL at 09:00

## 2020-07-27 RX ADMIN — SODIUM CHLORIDE SCH MG: 0.9 INJECTION, SOLUTION INTRAVENOUS at 10:29

## 2020-07-27 RX ADMIN — IRON SUPPLEMENT SCH MG: 325 TABLET ORAL at 14:03

## 2020-07-27 RX ADMIN — IRON SUPPLEMENT SCH MG: 325 TABLET ORAL at 10:29

## 2020-07-27 RX ADMIN — ACETAMINOPHEN PRN MG: 500 TABLET, FILM COATED ORAL at 20:08

## 2020-07-27 RX ADMIN — HYDROXYCHLOROQUINE SULFATE SCH MG: 200 TABLET, FILM COATED ORAL at 10:29

## 2020-07-27 RX ADMIN — Medication SCH ML: at 20:08

## 2020-07-27 RX ADMIN — IRON SUPPLEMENT SCH MG: 325 TABLET ORAL at 20:08

## 2020-07-27 NOTE — P.PN
Subjective


Date of Service: 07/27/20


Primary Care Provider: [facility]


Chief Complaint: Acute renal failure/lupus/anemia


Subjective: New changes





<Pj Marquez - Last Filed: 07/27/20 09:28>


Date of Service: 07/27/20





<Freddie Geller - Last Filed: 07/27/20 15:21>





Review of Systems


10-point ROS is otherwise unremarkable


Neurological: Other (Headache)





<Pj Marquez - Last Filed: 07/27/20 09:28>





Physical Examination





- Vital Signs


Temperature: 97.5 F


Blood Pressure: 130/84


Pulse: 84


Respirations: 18


Pulse Ox (%): 100





- Physical Exam


General: Alert, In no apparent distress, Oriented x3


HEENT: Atraumatic, Normocephalic, PERRLA, Other (Angioedema lower lip with some 

blistering.)


Neck: Supple


Respiratory: Clear to auscultation bilaterally


Cardiovascular: Regular rate/rhythm, Normal S1 S2


Capillary refill: <2 Seconds


Gastrointestinal: Normal bowel sounds, Soft and benign


Musculoskeletal: No contractures, No erythema


Integumentary: No tenderness/swelling, No erythema


Neurological: Normal speech, Normal strength at 5/5 x4 extr, Normal tone


Other Physical/Emotional Findings: Pleasant and cooperative





- Studies


Medications List Reviewed: Yes





<Pj Marquez - Last Filed: 07/27/20 09:28>





Assessment & Plan


Discharge Plan: Home


Plan to discharge in: 24 Hours





- Code Status/Comfort Care


Code Status Assessed: Yes (Patient is full code)


Physician Review Additional Text: 





Impression:


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


Transient episode of altered mental status with fall, of abnormal head CT


History of lupus:


Severe Anemia of chronic disease-resolved


Congestive heart failure status post pericardial window in 2008:





Plan:


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 

with nephrology recommendations.  Patient awaiting outpatient dialysis chair.  

Further workup still taking place.  Patient complained of lower lip swelling 

that began on the morning of the 26th after the injection of medication.  Will 

review medication and discuss further with nephrology. 


Transient episode of altered mental status with fall, of abnormal head CT:  Was 

notified that this morning patient was found on the floor by laboratory staff.  

At that time patient was confused, disoriented.  Patient was brought down for a 

stat CT head.  CT revealed small focus of subcortical hypodensity in the right 

parietal lobe that is nonspecific.  Will order MRI without contrast and consult 

Neurology.  Patient is currently back at baseline states she is feeling well.  

Patient believes that she was having a bad dream and jumped out of bed having a 

panic attack.  I believe that this is the most likely cause.


History of lupus:  Patient need to follow up with rheumatology on outpatient 

basis after discharge.


Severe Anemia of chronic disease-resolved:  Hemoglobin up to 11, significant 

improvement.  Will continue to closely monitor.  Patient reports that she had a 

dark stool 2 days ago, occult blood test ordered, await results.  Platelet count

is 96 today which is stable, will discuss this with nephrology as well. 


History of Congestive heart failure status post pericardial window in 2008:  

Current echocardiogram shows ejection fraction of 66%.  Recommend follow up with

Cardiology on outpatient basis for  further evaluation.


Critical Care: No


Time Spent Managing Pts Care (In Minutes): 55





<Pj Marquez - Last Filed: 07/27/20 09:28>


Physician Review Additional Text: 





Patient was seen and examined and findings were discussed


Agree with the assessment and plan as documented by the YSABEL





<Freddie Geller - Last Filed: 07/27/20 15:21>

## 2020-07-27 NOTE — RAD REPORT
EXAM DESCRIPTION:  MRI - Brain Wo Cont - 7/27/2020 12:07 pm

 

CLINICAL HISTORY:  s/p fall

Fall, head injury, headache, drowsiness

 

COMPARISON:  Head Brain Wo Cont dated 7/27/2020; Renal Ultrasound-Complete dated 7/20/2020

 

TECHNIQUE:  Multi-sequence, multiplanar MR imaging of the brain was performed without contrast.

 

FINDINGS:  Areas of abnormal cortical and subcortical T2 and FLAIR signal seen along the posterosuper
ior parietal lobes bilaterally, greater on the right. These regions do not demonstrate elevated nonco
ntrast T1 signal. No abnormal diffusion-weighted imaging seen in these regions. Recent CT head imagin
g shows no evidence of blood products in these locations. No hydrocephalus, acute bleed or midline sh
ift.

 

Moderate mucoperiosteal thickening is present in the left maxillary antrum. The paranasal sinuses and
 mastoids are otherwise clear.

 

No depressed calvarial fracture.

 

IMPRESSION:  Nonspecific areas of T2 and FLAIR hyperintensity in the cortical and subcortical regions
 of the posterosuperior parietal lobes identified, greater on the right. No acute hemorrhage or evide
nce of acute CVA seen. These findings may be related to previous infarction or trauma/infection.

 

Advise post-contrast sequences through the brain to evaluate for potential enhancement of these regio
ns.

## 2020-07-27 NOTE — CON
Reason For Consultation:  Consultation called because the patient passed out.



History Of Present Illness:  Ms. Mcduffie is a 25-year-old right-handed  patient with hi
story of lupus, acute renal failure, and chronic renal failure, now on kidney dialysis and congestive
 heart failure, who apparently fell out of bed earlier today.  The patient said she was not sure what
 was happening, perhaps she was dreaming, but she thought she swung around and sat on her bed, then t
he next memory was being on the floor, having bitten her tongue, and was disoriented.  The event was 
not witnessed.  The patient does say in retrospect, she has had episodes of loss of time and staring 
off, but has not had any tonic or clonic activity.  She was never worked up for epilepsy.  Her head C
T scan revealed no acute ischemic or hemorrhagic changes, and a subsequent brain MRI done earlier tod
ay showed no acute stroke, but nonspecific T2 and FLAIR areas consistent with small vessel ischemic d
isease, likely secondary to hypertension and chronic renal failure. 



She has not received antiepileptic medications and has not had additional episodes while hospitalized
.



Past Medical History:  Significant as indicated for lupus, acute renal failure, congestive heart fail
ure, status post pericardial window in 2008, chronic anemia.



Past Surgical History:  Right kidney biopsy with pericardial window, hemodialysis.



Allergies:  PENICILLIN.



Medications:  She is receiving Feosol 325 mg 3 times daily.  She did receive Lasix earlier today, Km
altrol 0.5 mcg every 48 hours, Retacrit 6000 units on Tuesday, Thursday, Saturday.  She has Zofran as
 needed and Phenergan along with heparin for DVT prophylaxis.



Family History:  Mother with heart disease and kidney disease.



Social History:  No alcohol, tobacco, or IV drug use.  She lives in a single family residence.



Review of Systems:

She denies any recent fevers or chills, nausea, vomiting, myalgias, arthralgias, headache, weight alfonzo
nge, or rash, any acute issues aside from mentioned.



Physical Examination:

Vital Signs:  Blood pressure 133/86, pulse 79, respiratory rate 18, temperature 97.4, oxygen saturati
on 98%.  Weight 131 pounds, height 4 feet 11 inches, BMI 26. 

General:  Ms. Mcduffie is resting in bed.  She is in no acute distress.  She is normocephalic, atraumati
c.  Sclerae anicteric.  Oropharynx is moist and pink. 

Neck:  Supple. 

Chest:  Clear. 

Heart:  Regular. 

Extremities:  No edema, cyanosis, or clubbing. 

Neurological:  She is alert and oriented to situation, place, and person.  She has no cranial nerve d
eficits.  She has no focal motor, sensory, coordination or gait deficits.  Reflexes are 2+ in upper a
nd lower extremities.



Laboratory Studies:  Complete blood count with differential shows white blood cell count 6.1, hemoglo
bin 11.4, platelets are 96.  Her creatinine today is 6.58.  She had a hemodialysis earlier today, whi
ch was after blood draw.  Sodium 142, potassium 3.8, chloride 108, albumin 2.4.  Parathyroid hormone 
elevated at 640.  Her calcium is normal at 8.9.  She has pending vitamin D levels and serum protein e
lectrophoresis.  Thyroid function studies show T4 of 0.95 and TSH 2.190.  Her hepatitis panel is pend
ing.  She has negative rheumatoid factor.  ANN pending.  Complement C3 is low at 38, complement C4 lo
w at 5.  Anti-proteinase 3 is less than 1.  Anti-myeloperoxidase is less than 1.  Double-stranded DNA
 antibodies pending.  Glomerular basement membrane HCG is pending.  She is negative for COVID-19.



Assessment:  Ms. Mcduffie is a 25-year-old patient with possible seizures and history of seizure-like ac
tivity, that is uncharacterized.  Brain MRI shows small vessel ischemic disease.  She has no focal ne
urological deficits.  She does have electrolyte derangements and has had hemodialysis earlier today.



Plan:  

1.Ambulatory video EEG monitoring for event characterization.

2.The patient is asked to maintain an event diary.

3.May consider Keppra at low dose with blood level check given renal failure.

4.The patient will be discharged home and follow up in Dr. Garnica's clinic by calling the office a
fter discharge to set up an appointment within 2 weeks.





KURT/CRAIG

DD:  07/27/2020 18:17:39Voice ID:  [id number]

DT:  07/27/2020 21:37:54Report ID:  [id number]

## 2020-07-27 NOTE — RAD REPORT
EXAM DESCRIPTION:  CT - Head Brain Wo Cont - 7/27/2020 6:15 am

 

 

******** ADDENDUM #1 ********

Findings communicated to Annabella Lai RN, by radiology support staff at 0624 hours on 7/27/2020.

Electronically signed by:   Corrie Locke DO   7/27/2020 6:28 AM CDT Workstation: [id number]

*** End of Addendum ***

CT HEAD WITHOUT CONTRAST

 

CLINICAL HISTORY:  Confusion

 

COMPARISON:  None.

 

TECHNIQUE:  Axial unenhanced CT imaging of the brain. Reformatted coronal and sagittal images obtaine
d.

This examination was performed according to our departmental dose optimization program, which include
s automated exposure control, adjustment of the mA and/or kV according to patient size and/or use of 
iterative reconstruction technique.

 

FINDINGS:  There is a faint area of relative subcortical white matter hypodensity within the right pa
rietal lobe. There is no hemorrhage, mass, or midline shift. There is normal ventricle size and conto
ur. Extra-axial fluid spaces appear normal.

Normal cerebellum and vermis. Prepontine cisterns are not effaced. Normal sella contents.

Intraorbital contents appear normal. There is mucosal thickening in the right ethmoid air cells. Mast
oid air cells are clear bilaterally. Skull base and calvarium are intact. Unremarkable scalp soft tis
sues.

 

IMPRESSION:  1. Small focus of subcortical hypodensity in the right parietal lobe is nonspecific. Thi
s could be sequela of remote chronic microvascular change, cerebritis, or small acute subacute infarc
t. No discrete mass.

2. Ethmoid sinus mucosal disease.

 

Electronically signed by:   Corrie Locke DO   7/27/2020 6:06 AM CDT Workstation: [id number]

 

 

 

Due to temporary technical issues with the PACS/Fluency reporting system, reports are being signed by
 the in house radiologist without review as a courtesy to ensure prompt reporting. The interpreting r
adiologist is fully responsible for the content of the report.

## 2020-07-28 LAB
ANISOCYTOSIS BLD QL: (no result)
BLD SMEAR INTERP: (no result)
HCT VFR BLD CALC: 30.5 % (ref 36–45)
LYMPHOCYTES # SPEC AUTO: 0.9 K/UL (ref 0.7–4.9)
MACROCYTES BLD QL: (no result)
MORPHOLOGY BLD-IMP: (no result)
PMV BLD: 9.3 FL (ref 7.6–11.3)
POLYCHROMASIA BLD QL SMEAR: (no result)
RBC # BLD: 2.92 M/UL (ref 3.86–4.86)

## 2020-07-28 RX ADMIN — IRON SUPPLEMENT SCH MG: 325 TABLET ORAL at 09:19

## 2020-07-28 RX ADMIN — HYDROXYCHLOROQUINE SULFATE SCH MG: 200 TABLET, FILM COATED ORAL at 09:19

## 2020-07-28 RX ADMIN — Medication SCH ML: at 09:19

## 2020-07-28 RX ADMIN — LEVETIRACETAM SCH MLS: 100 INJECTION, SOLUTION, CONCENTRATE INTRAVENOUS at 20:43

## 2020-07-28 RX ADMIN — HYDRALAZINE HYDROCHLORIDE PRN MG: 20 INJECTION INTRAMUSCULAR; INTRAVENOUS at 16:14

## 2020-07-28 RX ADMIN — IRON SUPPLEMENT SCH MG: 325 TABLET ORAL at 20:44

## 2020-07-28 RX ADMIN — Medication SCH: at 21:00

## 2020-07-28 RX ADMIN — IRON SUPPLEMENT SCH MG: 325 TABLET ORAL at 13:00

## 2020-07-28 RX ADMIN — EPOETIN ALFA-EPBX SCH UNIT: 4000 INJECTION, SOLUTION INTRAVENOUS; SUBCUTANEOUS at 20:50

## 2020-07-28 RX ADMIN — VENLAFAXINE SCH MG: 75 TABLET ORAL at 09:19

## 2020-07-28 RX ADMIN — ACETAMINOPHEN PRN MG: 500 TABLET, FILM COATED ORAL at 23:38

## 2020-07-28 RX ADMIN — Medication SCH ML: at 20:51

## 2020-07-28 RX ADMIN — SODIUM CHLORIDE SCH MG: 0.9 INJECTION, SOLUTION INTRAVENOUS at 09:19

## 2020-07-28 NOTE — P.PN
Subjective


Date of Service: 07/28/20


Primary Care Provider: [facility]


Chief Complaint: Acute renal failure/lupus/anemia


Subjective: No new changes, Doing well





<GoPatrick - Last Filed: 07/28/20 10:37>


Date of Service: 07/29/20





<Freddie Geller - Last Filed: 07/29/20 16:40>





Review of Systems


General: Unremarkable


Eyes: Unremarkable


ENT: Unremarkable


Respiratory: Unremarkable


Cardiovascular: Unremarkable


Gastrointestinal: Unremarkable


Musculoskeletal: Unremarkable


Integumentary: Unremarkable





<Patrick Stiles - Last Filed: 07/28/20 10:37>





Physical Examination





- Vital Signs


Temperature: 97.5 F


Blood Pressure: 158/83


Pulse: 65


Respirations: 16


Pulse Ox (%): 100





- Physical Exam


General: Alert, In no apparent distress, Oriented x3, Cooperative


HEENT: Atraumatic, Normocephalic, PERRLA


Neck: Supple, Other (Trachea midline)


Respiratory: Clear to auscultation bilaterally, Normal air movement


Cardiovascular: No edema, Normal pulses, Regular rate/rhythm


Capillary refill: <2 Seconds


Gastrointestinal: Normal bowel sounds, Soft and benign


Musculoskeletal: No clubbing, No swelling, No contractures


Integumentary: No rashes, No breakdown, No significant lesion


Neurological: Normal gait, Normal speech, Normal strength at 5/5 x4 extr, Normal

tone


Other Physical/Emotional Findings: Pleasant and cooperative





- Studies


Medications List Reviewed: Yes





<Patrick Stiles - Last Filed: 07/28/20 10:37>





Assessment And Plan





- Plan





Impression:


End-stage renal disease now on hemodialysis complicated by a history of lupus, 

chronic anemia and nephritis:


Transient episode of altered mental status with fall, of abnormal head CT: 


History of lupus:


Severe Anemia of chronic disease:


Congestive heart failure status post pericardial window in 2008:





Plan:


Acute kidney failure complicated by a history of lupus, chronic anemia and 

possible nephritis:  Patient noted to have a significantly elevated creatinine 

level of 9.5 on admission.  This is likely secondary to a history of lupus, 

chronic anemia and nephritis.  Patient is tolerating dialysis.  Will continue 

current plan of care.  Patient is pending a chair at dialysis center.  Once the 

chair is arranged patient can be discharged.


Transient episode of altered mental status with fall, of abnormal head CT:  Was 

notified that this morning patient was found on the floor by laboratory staff.  

At that time patient was confused, disoriented.  Patient was brought down for a 

stat CT head.  CT revealed small focus of subcortical hypodensity in the right 

parietal lobe that is nonspecific.  MRI brain with Nonspecific areas of T2 and 

FLAIR hyperintensity in the cortical and subcortical regions of the 

posterosuperior parietal lobes identified, greater on the right. No acute 

hemorrhage or evidence of acute CVA seen. These findings may be related to 

previous infarction or trauma/infection. Advise post-contrast sequences through 

the brain to evaluate for potential enhancement of these regions. Patient is 

currently back at baseline states she is feeling well.  Patient believes that 

she was having a bad dream and jumped out of bed having a panic attack.  I 

believe that this is the most likely cause.  Patient was cleared by Neurology.  

Recommending follow-up in 's office in 2 weeks after discharge.


History of lupus:  Patient need to follow up with rheumatology on outpatient 

basis after discharge.


Severe Anemia of chronic disease:  Patient noted to have the H&H of 3.3/9.0 on 

admission.  ER started patient on 2 units PRBCs.   Hemoglobin and hematocrit of 

10.5/30.5 today.  Patient denies GI bleeding and there is no indication that the

patient is bleeding.  Admits to a long history of chronic anemia secondary to 

her lupus.  Denies any continued dark stools.  Fecal occult is negative.  

Platelet count of 84 today similar to 2 days ago.  Will continue to monitor.


Congestive heart failure status post pericardial window in 2008:  Patient states

that she was told she had congestive heart failure in 2008 when she was diagno

sed with lupus.  States that at that time she was noted to have a pericardial 

effusion and underwent pericardial window which resolve her issue.  

Echocardiogram with EF of 66%.  Patient would likely benefit from a follow-up 

with Cardiology on an outpatient basis after discharge for further.





Discharge Plan: Home


Plan to discharge in: 48 Hours





- Code Status/Comfort Care


Code Status Assessed: Yes


Physician Review Additional Text: 





Patient was seen and examined and findings were discussed


Agree with the assessment and plan as documented by the YSABEL


Time Spent Managing PTS Care (In Minutes): 45





<Patrick Stiles - Last Filed: 07/28/20 10:37>

## 2020-07-28 NOTE — PN
Date of Progress Note:  07/27/2020



Chief Complaint:  Severe hyperazotemia.



Objective:  The patient is __________-year-old woman.  She has history of SLE, pericardial effusion a
nd back in 2008 status post pericardial window, CKD due to lupus.  She had biopsy when she was 12-yea
r-old and was treated with CellCept and __________ for lupus nephritis.  She presented to the [facility] with severe hyperazotemia, low C3 and low C4 were found and the patient will continue steroids for 
lupus nephritis.  The patient agreed to start dialysis due to the fact that renal function has not im
proved and the patient appears to be at end-stage renal disease.



Review of Systems:

Denies PND or orthopnea.



Physical Examination:

Lungs:  Diminished breath sounds at bases. 

Heart:  S1 and S2. 

Abdomen:  Soft, benign. 

Extremities:  Slight edema.



Laboratory Data:  Hemoglobin 11.4, WBC 6.1, and platelet count is 96,000.  Sodium 142, potassium 3.8,
 chloride 108, CO2 of 24, BUN 55, creatinine 6.58, phosphorus 6.0, and calcium 8.9.



Impression And Plan:  

1.Severe hyperazotemia.  Renal function has not improved.  The patient has lupus nephritis.  The pat
ient will start dialysis.  She was scheduled to have dialysis today.  Heparin was on hold.  The patie
nt is undergoing workup for status post fall today.  She remains hemodynamically stable.

2.Hypertension.  Blood pressure in acceptable control.  Continue to monitor blood pressure during di
alysis.

3.Lupus nephritis.  The patient is on steroids.  Workup with serology 

test is pending.

4.Anemia.  Monitor hemoglobin level, adjust ELMER as needed.





EB/MODL

DD:  07/27/2020 20:41:05Voice ID:  [id number]

DT:  07/28/2020 01:05:03Report ID:  [id number]

## 2020-07-29 VITALS — DIASTOLIC BLOOD PRESSURE: 90 MMHG | TEMPERATURE: 97.8 F | SYSTOLIC BLOOD PRESSURE: 162 MMHG

## 2020-07-29 VITALS — OXYGEN SATURATION: 98 %

## 2020-07-29 LAB
1,25(OH)2D SERPL-MCNC: 11 PG/ML (ref 18–72)
1,25(OH)2D2 SERPL-MCNC: <8 PG/ML
HCT VFR BLD CALC: 28 % (ref 36–45)
HCV RNA SPEC NAA+PROBE-LOG#: <1.18 LOG IU/ML
LYMPHOCYTES # SPEC AUTO: 1.6 K/UL (ref 0.7–4.9)
PMV BLD: 9.1 FL (ref 7.6–11.3)
RBC # BLD: 2.69 M/UL (ref 3.86–4.86)

## 2020-07-29 RX ADMIN — Medication SCH: at 21:00

## 2020-07-29 RX ADMIN — Medication SCH ML: at 08:25

## 2020-07-29 RX ADMIN — LEVETIRACETAM SCH MLS: 100 INJECTION, SOLUTION, CONCENTRATE INTRAVENOUS at 21:11

## 2020-07-29 RX ADMIN — SODIUM CHLORIDE SCH MG: 0.9 INJECTION, SOLUTION INTRAVENOUS at 08:24

## 2020-07-29 RX ADMIN — LEVETIRACETAM SCH MLS: 100 INJECTION, SOLUTION, CONCENTRATE INTRAVENOUS at 08:23

## 2020-07-29 RX ADMIN — VENLAFAXINE SCH: 75 TABLET ORAL at 08:24

## 2020-07-29 RX ADMIN — ANTACID TABLETS SCH MG: 500 TABLET, CHEWABLE ORAL at 11:24

## 2020-07-29 RX ADMIN — ANTACID TABLETS SCH MG: 500 TABLET, CHEWABLE ORAL at 16:05

## 2020-07-29 RX ADMIN — IRON SUPPLEMENT SCH MG: 325 TABLET ORAL at 13:34

## 2020-07-29 RX ADMIN — ANTACID TABLETS SCH MG: 500 TABLET, CHEWABLE ORAL at 08:23

## 2020-07-29 RX ADMIN — IRON SUPPLEMENT SCH MG: 325 TABLET ORAL at 21:11

## 2020-07-29 RX ADMIN — Medication SCH: at 08:25

## 2020-07-29 RX ADMIN — HYDRALAZINE HYDROCHLORIDE PRN MG: 20 INJECTION INTRAMUSCULAR; INTRAVENOUS at 12:53

## 2020-07-29 RX ADMIN — HYDROXYCHLOROQUINE SULFATE SCH MG: 200 TABLET, FILM COATED ORAL at 08:25

## 2020-07-29 RX ADMIN — CALCITRIOL CAPSULES 0.25 MCG SCH MCG: 0.25 CAPSULE ORAL at 09:50

## 2020-07-29 RX ADMIN — IRON SUPPLEMENT SCH MG: 325 TABLET ORAL at 08:24

## 2020-07-29 NOTE — P.DS
Admission Date: 07/20/20


Discharge Date: 07/29/20


Primary Care Provider: [facility]


Disposition: ROUTINE DISCHARGE


Reason for Admission: Acute renal failure/lupus/anemia


Procedures: 





Tessio catheter in place for dialysis


Brief History of Present Illness: 





25-year-old  female with past medical history of lupus, anemia 

that presents to the emergency room complaining of shortness of breath, sore 

throat, low-grade fever that started approximately a month ago and has 

progressively worsened.  Patient states that she also has been having an 

intermittent cough.  Patient denies any contact with a known Covid positive 

person.  Patient states that in 2008 she was diagnosed with lupus.  At that time

she was also told she had congestive heart failure and underwent a pericardial 

window.  She was seeing a rheumatologist in [facility] but moved to [place] 

several years ago and has not followed up with anyone.  States that she has been

on prednisone 10 mg daily for the last 10-12 years.  She also had a kidney 

biopsy-date unknown. 





In the emergency room patient is found to be in acute renal failure and with 

severe anemia with a creatinine level of 9.55, BUN of 79, an H&H of 3.3/9.  Her 

proBNP is 8398.  She is not requiring O2 support at this time.  She is afebrile 

with a temperature reading of 98.6 on arrival to ED.  Vitals were also stable 

with a blood pressure of 117/68, a pulse of 103, respiratory rate of 18 and 

oxygen saturations of 100% on room air.  Her pregnancy test is negative.  Urine 

is negative.





On physical exam patient is calm.  She is in no distress. Is not having cough, 

does not feel febrile and is doing well after some IV fluids and initial 

treatment in the ED.  ED will initiate 2 units of PRBC blood transfusion.  

Patient will be admitted to hospital and further evaluated.


Hospital Course: 


 Patient was admitted for findings of severe anemia with an original H&H of 

3.3/9.0 on admission.  She is also noted to be in renal failure with a 

creatinine level of 9.55.  Patient underwent blood transfusion and tolerated 

transfusion of 2 units PRBCs on admission.  Nephrology was consulted.  Patient 

was started on iron supplementation. Patient eventually was diagnosed with end-

stage renal disease. She had a Tessio catheter placed and has been tolerating 

dialysis on this admission.  She has been approved to continue dialysis as an 

outpatient on Monday Wednesday and Friday.  Patient will be dialyzed today prior

to discharge.


Patient's blood pressure had not been controlled either.  She was started on an 

ACE-inhibitor.  For the lupus nephritis patient will continue with prednisone 

and Plaquenil.  On follow-up with Nephrology patient is a will be started on and

immunosuppressive therapy.  Patient was also noted to have secondary h

yperparathyroidism and was started on calcitriol.


At this time patient is stable.  She is tolerating dialysis well and her 

hemoglobin and hematocrit have stabilized.  She is instructed to follow up with 

Nephrology as an outpatient for which he verbalizes understanding.  She will 

also need to follow up with PCP for referral to a rheumatologist.


Of note.  Patient had a reaction to was believed to be of fruit or a medication 

possibly angioedema.  Her lower right lip is just slightly erythematous and the 

swelling has resolved.  Patient has had no other interactions or reactions since

this 1 event.


Vital Signs/Physical Exam: 














Temp Pulse Resp BP Pulse Ox


 


 98 F   80   18   171/91 H  98 


 


 07/29/20 08:00  07/29/20 08:00  07/29/20 08:00  07/29/20 08:00  07/29/20 08:00








General: Alert, In no apparent distress, Oriented x3, Other (Tessio catheter in 

place)


HEENT: Atraumatic, Normocephalic, PERRLA, Mucous membr. moist/pink (Slightly 

swollen lower right lip)


Neck: Supple, Other (Trachea midline)


Respiratory: Clear to auscultation bilaterally, Normal air movement


Cardiovascular: No edema, Normal pulses, Regular rate/rhythm


Capillary refill: <2 Seconds


Gastrointestinal: Normal bowel sounds, Soft and benign, Non-distended


Musculoskeletal: No swelling, No contractures, No erythema


Integumentary: No rashes, No breakdown, No significant lesion


Neurological: Normal gait, Normal speech, Normal strength at 5/5 x4 extr, Normal

tone


Other Physical/Emotional Findings: Pleasant and cooperative


Laboratory Data at Discharge: 














WBC  8.5 K/uL (4.3-10.9)  D 07/29/20  04:19    


 


Hgb  9.6 g/dL (12.0-15.0)  L  07/29/20  04:19    


 


Hct  28.0 % (36.0-45.0)  L  07/29/20  04:19    


 


Plt Count  81 K/uL (152-406)  L*  07/29/20  04:19    


 


PT  11.5 SECONDS (9.5-12.5)   07/20/20  11:57    


 


INR  0.97   07/20/20  11:57    


 


Sodium  142 mmol/L (136-145)   07/27/20  06:07    


 


Potassium  3.8 mmol/L (3.5-5.1)   07/27/20  06:07    


 


BUN  55 mg/dL (7-18)  H  07/27/20  06:07    


 


Creatinine  6.58 mg/dL (0.55-1.3)  H*  07/27/20  06:07    


 


Glucose  98 mg/dL ()   07/27/20  06:07    


 


Uric Acid  11.4 mg/dL (2.6-6.0)  H  07/20/20  20:21    


 


Phosphorus  6.0 mg/dL (2.5-4.9)  H  07/27/20  06:07    


 


Magnesium  2.1 mg/dL (1.8-2.4)   07/26/20  05:31    


 


Total Bilirubin  1.7 mg/dL (0.2-1.0)  H  07/25/20  05:24    


 


AST  15 U/L (15-37)   07/25/20  05:24    


 


ALT  9 U/L (12-78)  L  07/25/20  05:24    


 


Alkaline Phosphatase  59 U/L ()   07/25/20  05:24    


 


Troponin I  < 0.02 ng/mL (0.0-0.045)   07/20/20  20:21    








Home Medications: 








Calcitrol [Rocaltrol*] 0.5 mcg PO Q48H 30 Days #15 cap 07/29/20 


Calcium Carbonate [Tums Regular*] 500 mg PO AC 30 Days #30 tab 07/29/20 


Ferrous Sulfate [Ferrous Sulfate*] 325 mg PO TID 30 Days #90 tab 07/29/20 


Hydroxychloroquine [Plaquenil*] 200 mg PO DAILY 30 Days #30 tab 07/29/20 


Levetiracetam [Keppra] 500 mg PO BID 30 Days #60 tablet 07/29/20 


Loratadine [Claritin*] 10 mg PO DAILY PRN 30 Days #30 tab 07/29/20 


Mycophenolate Mofetil [Cellcept] 500 mg PO BID 30 Days #60 07/29/20 


Nepro Shake [Nepro*] 237 ml PO BID 30 Days #60 can 07/29/20 


Pantoprazole Sodium [Protonix] 40 mg PO DAILY 30 Days #30 tablet. 07/29/20 


Venlafaxine HCl [Effexor*] 75 mg PO DAILY 30 Days #30 07/29/20 


predniSONE [Deltasone*] 40 mg PO DAILY 30 Days #30 tab 07/29/20 





New Medications: 


Mycophenolate Mofetil [Cellcept] 500 mg PO BID 30 Days #60


Loratadine [Claritin*] 10 mg PO DAILY PRN 30 Days #30 tab


 PRN Reason: Allergies


predniSONE [Deltasone*] 40 mg PO DAILY 30 Days #30 tab


Venlafaxine HCl [Effexor*] 75 mg PO DAILY 30 Days #30


Ferrous Sulfate [Ferrous Sulfate*] 325 mg PO TID 30 Days #90 tab


Nepro Shake [Nepro*] 237 ml PO BID 30 Days #60 can


Hydroxychloroquine [Plaquenil*] 200 mg PO DAILY 30 Days #30 tab


Pantoprazole Sodium [Protonix] 40 mg PO DAILY 30 Days #30 tablet.


Calcitrol [Rocaltrol*] 0.5 mcg PO Q48H 30 Days #15 cap


Calcium Carbonate [Tums Regular*] 500 mg PO AC 30 Days #30 tab


Patient Discharge Instructions: Please go over new medications with patient.  

Make sure patient knows address of dialysis center.  Patient needs to follow up 

with Nephrology as well.


Time spent managing pt's care (in minutes): 55

## 2020-07-29 NOTE — PN
Date of Progress Note:  07/29/2020



Subjective:  The patient was admitted with renal failure, advanced kidney disease, progressed to end-
stage renal disease.  The patient was started on dialysis.



Physical Examination:

Vital Signs:  Blood pressure 146/86, pulse of 88, afebrile. 

Chest:  Clear to auscultation. 

Heart:  S1, S2.  Regular. 

Abdomen:  Soft, nontender. 

Extremities:  No edema. 

Neurological:  Alert, oriented x3.  No focal.



Laboratory Data:  H and H 9.6/28.  Sodium 142, potassium 3.8, bicarb 24, BUN 55, creatinine 6.5, calc
ium 8.9, phosphorus of 6.



Medications:  Current medications the patient on include,

1.Heparin.

2.Epogen.

3.Tums.

4.Keppra.

5.Lasix.

6.Zofran.

7.Pantoprazole.



Assessment And Plan:  

1.End-stage renal disease secondary to lupus nephritis.  I am going to continue dialysis 3 times a w
[place].  The patient already has set up for outpatient dialysis.  We will follow up.

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  I had long discussion with the patie
nt regarding the need to start on ACE inhibitor and the pregnancy.  The patient understands that she 
cannot have a pregnancy or breast feeding while she is on lisinopril, agreed on the plan.  We will st
art the patient on lisinopril.  We will follow up the serology.

3.Lupus.  Continue Plaquenil and prednisone for the time being.

4.Anemia of chronic kidney disease.  Continue ELMER.

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  Continue prednisone.





INES

DD:  07/29/2020 16:10:55Voice ID:  [id number]

DT:  07/29/2020 19:57:00Report ID:  [id number]

## 2020-07-29 NOTE — PN
Date of Progress Note:  07/28/2020



Subjective:  The patient is doing well.  The patient was admitted with progression of chronic kidney 
disease to end-stage renal disease.  The patient initiated on dialysis, tolerating the dialysis very 
well.



Physical Examination:

Vital Signs:  When I saw the patient, blood pressure 170/99, pulse of 76, afebrile.  The patient stil
l had urine output of 400-600. 

Chest:  Clear to auscultation. 

Heart:  S1, S2.  Systolic murmur. 

Abdomen:  Soft, nontender. 

Extremities:  No edema. 

Neurologic:  Alert and oriented x3.  Nonfocal.



Current Medications:  The patient on include:

1.Plaquenil.

2.Loratadine.

3.Heparin.

4.Ferrous sulfate.

5.Epogen.

6.Keppra.

7.Effexor.

8.Zofran.

9.Prednisone.



Laboratory Data:  H and H 10.5/30.5.  Sodium 142, potassium 3.8, bicarb 24, BUN 55, creatinine 6.5, c
alcium 8.9, phosphorus 6.  .  C3 38, C4 of 5.  Rheumatoid factor was negative.  HIV was reacti
ve.  ANN is still pending.  Double-stranded DNA is still pending.  COVID was negative.



Assessment And Plan:  

1.Chronic kidney disease, progression to end-stage renal disease.  We will continue the patient on d
ialysis.  The patient is going to be due for dialysis tomorrow and we will follow up.

2.Hypertension, not controlled, with nephrotic range proteinuria.  I am going to start the patient o
n ACE inhibitor.

3.Lupus nephritis with lupus activity, supported with depleted complement.  Agree with prednisone an
d Plaquenil.  We will follow up.  The patient is going to need to start on immunosuppressive therapy.


4.Lupus as above.

5.Secondary hyperparathyroidism.  I am going to start the patient on Tums and calcitriol.





MA/MODL

DD:  07/28/2020 20:41:40Voice ID:  [id number]

DT:  07/29/2020 00:33:04Report ID:  [id number]

## 2020-07-30 ENCOUNTER — HOSPITAL ENCOUNTER (INPATIENT)
Dept: HOSPITAL 97 - ER | Age: 25
LOS: 5 days | Discharge: HOME | DRG: 545 | End: 2020-08-04
Attending: HOSPITALIST | Admitting: HOSPITALIST
Payer: SELF-PAY

## 2020-07-30 VITALS — BODY MASS INDEX: 27 KG/M2

## 2020-07-30 DIAGNOSIS — D69.6: ICD-10-CM

## 2020-07-30 DIAGNOSIS — M32.9: ICD-10-CM

## 2020-07-30 DIAGNOSIS — N25.0: ICD-10-CM

## 2020-07-30 DIAGNOSIS — Z99.2: ICD-10-CM

## 2020-07-30 DIAGNOSIS — R55: ICD-10-CM

## 2020-07-30 DIAGNOSIS — I12.0: ICD-10-CM

## 2020-07-30 DIAGNOSIS — Z88.0: ICD-10-CM

## 2020-07-30 DIAGNOSIS — N18.6: ICD-10-CM

## 2020-07-30 DIAGNOSIS — Z79.899: ICD-10-CM

## 2020-07-30 DIAGNOSIS — D63.1: ICD-10-CM

## 2020-07-30 DIAGNOSIS — E46: ICD-10-CM

## 2020-07-30 DIAGNOSIS — M32.14: ICD-10-CM

## 2020-07-30 DIAGNOSIS — M32.19: Primary | ICD-10-CM

## 2020-07-30 DIAGNOSIS — Z11.59: ICD-10-CM

## 2020-07-30 DIAGNOSIS — Z79.52: ICD-10-CM

## 2020-07-30 LAB
BUN BLD-MCNC: 38 MG/DL (ref 7–18)
GLUCOSE SERPLBLD-MCNC: 115 MG/DL (ref 74–106)
HCT VFR BLD CALC: 31 % (ref 36–45)
LYMPHOCYTES # SPEC AUTO: 0.6 K/UL (ref 0.7–4.9)
MAGNESIUM SERPL-MCNC: 2 MG/DL (ref 1.8–2.4)
PMV BLD: 9.3 FL (ref 7.6–11.3)
POTASSIUM SERPL-SCNC: 4.4 MMOL/L (ref 3.5–5.1)
RBC # BLD: 2.98 M/UL (ref 3.86–4.86)

## 2020-07-30 PROCEDURE — 70450 CT HEAD/BRAIN W/O DYE: CPT

## 2020-07-30 PROCEDURE — 82945 GLUCOSE OTHER FLUID: CPT

## 2020-07-30 PROCEDURE — 84157 ASSAY OF PROTEIN OTHER: CPT

## 2020-07-30 PROCEDURE — 86850 RBC ANTIBODY SCREEN: CPT

## 2020-07-30 PROCEDURE — 72100 X-RAY EXAM L-S SPINE 2/3 VWS: CPT

## 2020-07-30 PROCEDURE — 83986 ASSAY PH BODY FLUID NOS: CPT

## 2020-07-30 PROCEDURE — 77003 FLUOROGUIDE FOR SPINE INJECT: CPT

## 2020-07-30 PROCEDURE — 85025 COMPLETE CBC W/AUTO DIFF WBC: CPT

## 2020-07-30 PROCEDURE — 86704 HEP B CORE ANTIBODY TOTAL: CPT

## 2020-07-30 PROCEDURE — 87340 HEPATITIS B SURFACE AG IA: CPT

## 2020-07-30 PROCEDURE — 93880 EXTRACRANIAL BILAT STUDY: CPT

## 2020-07-30 PROCEDURE — 83735 ASSAY OF MAGNESIUM: CPT

## 2020-07-30 PROCEDURE — 86870 RBC ANTIBODY IDENTIFICATION: CPT

## 2020-07-30 PROCEDURE — 86592 SYPHILIS TEST NON-TREP QUAL: CPT

## 2020-07-30 PROCEDURE — 85610 PROTHROMBIN TIME: CPT

## 2020-07-30 PROCEDURE — 80061 LIPID PANEL: CPT

## 2020-07-30 PROCEDURE — 85730 THROMBOPLASTIN TIME PARTIAL: CPT

## 2020-07-30 PROCEDURE — 97161 PT EVAL LOW COMPLEX 20 MIN: CPT

## 2020-07-30 PROCEDURE — 80053 COMPREHEN METABOLIC PANEL: CPT

## 2020-07-30 PROCEDURE — 80048 BASIC METABOLIC PNL TOTAL CA: CPT

## 2020-07-30 PROCEDURE — 86140 C-REACTIVE PROTEIN: CPT

## 2020-07-30 PROCEDURE — 86706 HEP B SURFACE ANTIBODY: CPT

## 2020-07-30 PROCEDURE — 70551 MRI BRAIN STEM W/O DYE: CPT

## 2020-07-30 PROCEDURE — 86900 BLOOD TYPING SEROLOGIC ABO: CPT

## 2020-07-30 PROCEDURE — 85652 RBC SED RATE AUTOMATED: CPT

## 2020-07-30 PROCEDURE — 86803 HEPATITIS C AB TEST: CPT

## 2020-07-30 PROCEDURE — 70544 MR ANGIOGRAPHY HEAD W/O DYE: CPT

## 2020-07-30 PROCEDURE — 87070 CULTURE OTHR SPECIMN AEROBIC: CPT

## 2020-07-30 PROCEDURE — 36415 COLL VENOUS BLD VENIPUNCTURE: CPT

## 2020-07-30 PROCEDURE — 96372 THER/PROPH/DIAG INJ SC/IM: CPT

## 2020-07-30 PROCEDURE — 84100 ASSAY OF PHOSPHORUS: CPT

## 2020-07-30 PROCEDURE — 89050 BODY FLUID CELL COUNT: CPT

## 2020-07-30 PROCEDURE — 71045 X-RAY EXAM CHEST 1 VIEW: CPT

## 2020-07-30 PROCEDURE — 90935 HEMODIALYSIS ONE EVALUATION: CPT

## 2020-07-30 PROCEDURE — 99285 EMERGENCY DEPT VISIT HI MDM: CPT

## 2020-07-30 PROCEDURE — 86901 BLOOD TYPING SEROLOGIC RH(D): CPT

## 2020-07-30 PROCEDURE — 82271 OCCULT BLOOD OTHER SOURCES: CPT

## 2020-07-30 NOTE — XMS REPORT
Clinical Summary

                            Created on:2020



Patient:Jayde Mcduffie

Sex:Female

:1995

External Reference #:[id number]





Demographics







                          Address                   [address] 



                                                    [place] [address]

 

                          Home Phone                [phone]

 

                          Work Phone                [phone]

 

                          Mobile Phone              [phone]

 

                          Preferred Language        ENG

 

                          Marital Status            Single

 

                          Hoahaoism Affiliation     Unknown

 

                          Race                      Unknown

 

                          Ethnic Group              Unknown









Author







                          Organization              [facility]

ict

 

                          Address                   [facility] [place] [address]









Support







                Name            Relationship    Address         Phone

 

                Patt Peng    Unavailable     [address] [facility] [address]  [phone]



                                                [place] [address] 









Care Team Providers







                    Name                Role                Phone

 

                    Unavailable         Primary Care Provider Unavailable









Allergies







             Active Allergy Reactions    Severity     Noted Date   Comments

 

             Amoxicillin-Pot Clavulanate                           2009   

 

             Penicillins                            2009   







Medications







          Medication Sig       Dispensed Refills   Start Date End Date  Status

 

          hydroxychloroquine Take 200 mg by           0                         

    Active



          (PLAQUENIL) 200 mg tablet mouth daily.                                

         

 

          ferrous sulfate 325 mg Take 325 mg by           0                     

        Active



          (65 mg iron) tablet mouth daily                                       

  



                    (with                                             



                    breakfast).                                         

 

          aspirin (ASPIRIN) 81 mg Chew and            0                         

    Active



          chewable tablet swallow 81 mg                                         



                    by mouth                                          



                    daily.                                            

 

          sertraline (ZOLOFT) 50 mg Take 50 mg by           0                   

          Active



          tablet    mouth daily.                                         

 

          atovaquone (MEPRON) 750 Take 10 mL by 210 mL    0         10/08/2017  

         Active



          mg/5 mL oral suspension mouth daily.                                  

       

 

          folic acid (FOLVITE) 1 mg Take 1 tablet 90 tablet 3         10/08/2017

           Active



          tablet    by mouth                                          



                    daily.                                            

 

          sennosides-docusate Take 1 tablet 30 tablet 3         10/08/2017      

     Active



          sodium (SENNA PLUS) by mouth                                          



          8.6-50 mg tablet daily.                                            

 

          hydroxychloroquine Take 1 tablet 30 tablet 3         10/08/2017       

    Active



          (PLAQUENIL) 200 mg tablet by mouth                                    

      



                    daily.                                            

 

          omeprazole (PRILOSEC) 20 Take 2    60 capsule 3         10/08/2017    

       Active



          mg delayed release capsules by                                        

 



          capsule   mouth daily.                                         

 

          mycophenolate (CELLCEPT) Take 3 tablets 90 tablet 3         10/08/2017

           Active



          500 mg tabletIndications: by mouth 2                                  

       



          Acquired hemolytic times daily.                                       

  



          anemia, Systemic lupus                                                

   



          erythematosus with                                                   



          glomerular disease,                                                   



          unspecified SLE type                                                  

 

 

                          predniSONE (DELTASONE) 20 Take 3 tablets by mouth alma

y Take 60mg (3 tablets) 

daily for 4 weeks 90 tablet    2            10/08/2017                Active



          mg tablet Take 50mg (2.5 tablets) daily for 2 weeks                   

                      



                    Then take 40mg (2 tablets) daily until seen in clinic.      

                                   

 

          ondansetron (ZOFRAN) 4 mg Take 1 tablet 20 tablet 0         10/08/2017

           Active



          tablet    by mouth every                                         



                    8 hours as                                         



                    needed for up                                         



                    to 5 doses for                                         



                    Nausea.                                           







Active Problems







                          Problem                   Noted Date

 

                          Thyroid lesion            10/01/2017

 

                          Lymphadenopathy, cervical 2017

 

                          Anemia                    2017

 

                          Systemic lupus erythematosus with glomerular disease 0

2017

 

                          Cold agglutinin disease   2017

 

                          Menorrhagia with regular cycle 

 

                          Uterine leiomyoma         

 

                          Acquired hemolytic anemia 

 

                          Current use of steroid medication 

 

                          High risk medication use  

 

                          Lupus nephritis           

 

                          Proteinuria               







Family History







                Medical History Relation        Name            Comments

 

                Heart failure   Father                          

 

                Diabetes        Maternal Grandfather                 

 

                Heart failure   Maternal Grandfather                 

 

                Stomach cancer  Maternal Grandmother                 

 

                Cerebral aneurysm Mother                          

 

                Heart failure   Mother                          

 

                Hyperthyroidism Mother                          









                Relation        Name            Status          Comments

 

                Father                                          

 

                Maternal Grandfather                                 

 

                Maternal Grandmother                                 

 

                Mother                                          







Social History







             Tobacco Use  Types        Packs/Day    Years Used   Date

 

             Never Smoker                                        









                Smokeless Tobacco: Never Used                                 









                Alcohol Use     Drinks/Week     oz/Week         Comments

 

                No                                              









                          Sex Assigned at Birth     Date Recorded

 

                          Not on file               









                    Job Start Date      Occupation          Industry

 

                    Not on file         Not on file         Not on file









                    Travel History      Travel Start        Travel End









                                        No recent travel history available.







Last Filed Vital Signs

Not on file



Plan of Treatment







                Health Maintenance Due Date        Last Done       Comments

 

                Cervical Cancer Scrn (3 Yrs) 2015                      

 

                IMM Influenza Seasonal Oct to March (>/= 19 yrs) 10/01/2020     

                 







Results

Not on fileafter 2019



Insurance







          Payer     Benefit Plan / Subscriber ID Effective Dates Phone     Addre

ss   Type



                    Group                                             

 

          [facility]      SELF-PAY  xxxxxxxxx 2017-Prese [phone] [id number] 



           SELF-PAY   UNSCREENED            [address]          [place], [place] 



                                                            [id number]     









           Guarantor Name Account Type Relation to Date of    Phone      Billing



                                 Patient    Birth                 Address

 

           Jayde Mcduffie Personal/Family Self       1995 [phone] [address]









             [address]                                             (Home)



                                        [place], [place]



                                                       [phone]



                                                       (Work)     







Advance Directives







                Code Status     Date Activated  Date Inactivated Comments

 

                Full Code       2017 12:28 PM 10/8/2017  8:31 PM

## 2020-07-30 NOTE — XMS REPORT
Clinical Summary

                            Created on:2020



Patient:Jayde Mcduffie

Sex:Female

:1995

External Reference #:[id number]





Demographics







                          Address                   [address] [place] [address]



                                                    [address] [place] [address]

 

                          Home Phone                [phone]

 

                          Mobile Phone              [phone]

 

                          Home Phone                [phone]

 

                          Email Address             [email or web address]

 

                          Preferred Language        English

 

                          Marital Status            Single

 

                          Mosque Affiliation     Unknown

 

                          Race                      Black or 

 

                          Ethnic Group              Not  or 









Author







                          Organization              [facility]

 

                          Address                   [address] [place] [address]









Support







                Name            Relationship    Address         Phone

 

                Patt Peng    Unavailable     [address] [place] [address] [phone]



                                                [address] [place] [address] 









Care Team Providers







                    Name                Role                Phone

 

                    Asked, No Pcp       Primary Care Provider Unavailable









Allergies







             Active Allergy Reactions    Severity     Noted Date   Comments

 

             Penicillins  Shortness Of Breath High         2018   







Medications

No known medications



Active Problems

Not on file



Social History







             Tobacco Use  Types        Packs/Day    Years Used   Date

 

             Never Smoker                                        









                Smokeless Tobacco: Never Used                                 









                Alcohol Use     Drinks/Week     oz/Week         Comments

 

                Yes                                             Occasionally









                          Sex Assigned at Birth     Date Recorded

 

                          Not on file               









                    Job Start Date      Occupation          Industry

 

                    Not on file         Not on file         Not on file









                    Travel History      Travel Start        Travel End









                                        No recent travel history available.







Last Filed Vital Signs

Not on file



Plan of Treatment







                Health Maintenance Due Date        Last Done       Comments

 

                CERVICAL CANCER SCREENING 2015                      

 

                INFLUENZA VACCINE 2020                      







Results

Not on fileafter 2019



Insurance







          Payer     Benefit Plan / Subscriber ID Effective Dates Phone     Addre

ss   Type



                    Group                                             

 

          [employer]      [employer] xxxxxxxxxxxx 2016-Present           [address]



                                        



                                                            [place] [address] 









           Guarantor Name Account Type Relation to Date of Birth Phone      Bill

ing



                                 Patient                          Address

 

           Jayde Mcduffie Personal/Family Self       1995 [phone] [address] [place] [address]



                                                       ([place])     [address] [place] [address]







                                                                  [address] [place] [address]



                                                                  [id number]







Advance Directives

For more information, please contact: [phone]





                Type            Date Recorded   Patient Representative Explanati

on

 

                Advance Directives, 2016 11:51 PM                 



                Living Will and                                 



                Medical Power of                                 



                                                        

 

                Advance Directives, 11/10/2016  1:17 AM                 



                Living Will and                                 



                Medical Power of

## 2020-07-30 NOTE — XMS REPORT
Continuity of Care Document

                            Created on:2020



Patient:NELLIE JOLLY

Sex:Female

:1995

External Reference #:[id number]





Demographics







                          Address                   [address] [address]



                                                    [place] [address]

 

                          Home Phone                [phone]

 

                          Work Phone                [phone]

 

                          Mobile Phone              [phone]

 

                          Email Address             [email or web address]

 

                          Preferred Language        English

 

                          Marital Status            Unknown

 

                          Congregational Affiliation     Unknown

 

                          Race                      Unknown

 

                          Additional Race(s)        White



                                                    Unavailable



                                                    Unavailable



                                                    Black or 

 

                          Ethnic Group              Not  or 









Author







                          Organization              [facility]

t

 

                          Address                   [address]



                                                    [place] [address]

 

                          Phone                     [phone]









Support







                Name            Relationship    Address         Phone

 

                Yeny Tay  Life Partner    [address] [phone]





                                                [place] [address] 

 

                [employer] leon   Other           [address] [phone]



                                                         



                                                [place] [address] 

 

                [employer]   Aunt            [address]   [phone]



                                                [place] [address] 









Care Team Providers







                    Name                Role                Phone

 

                    Asked, No Pcp       Primary Care Physician Unavailable

 

                    David NP,  G       Attending Clinician [phone]

 

                    Doctor Unassigned,  Name Attending Clinician Unavailable









Problems







       Condition Condition Condition Status Onset  Resolution Last   Treating Co

mments 

Source



       Name   Details Category        Date   Date   Treatment Clinician        



                                                 Date                 

 

       Thyroid Thyroid Disease Active 2017                             [facility]



       lesion lesion               0                               Health



                                   00:00:                             



                                   00                                 

 

       Lymphadeno Lymphadeno Disease Active                              H

bharat starks,                                              Health



       cervical cervical               00:00:                             



                                   00                                 

 

       Anemia Anemia Disease Active                              [facility]



                                                                  Health



                                   00:00:                             



                                   00                                 

 

       Systemic Systemic Disease Active                              [facility] lorenzo



       lupus  lupus                                               Health



       erythemato erythemato               00:00:                             



       blanche with blanche with               00                                 



       glomerular glomerular                                                  



       disease disease                                                  

 

       Cold   Cold   Disease Active                              [facility]



       agglutinin agglutinin                                              He

alth



       disease disease               00:00:                             



                                   00                                 

 

       Menorrhagi Menorrhagi Disease Active                                    H

myriamis



       a with a with                                                  Health



       regular regular                                                  



       cycle  cycle                                                   

 

       Uterine Uterine Disease Active                                    [facility]



       leiomyoma leiomyoma                                                  [facility]

th

 

       Acquired Acquired Disease Active                                    [facility] lorenzo



       hemolytic hemolytic                                                  [facility]

th



       anemia anemia                                                  

 

       Current Current Disease Active                                    [facility]



       use of use of                                                  Health



       steroid steroid                                                  



       medication medication                                                  

 

       High risk High risk Disease Active                                    [facility]

ris



       medication medication                                                  He

alth



       use    use                                                     

 

       Lupus  Lupus  Disease Active                                    [facility]



       nephritis nephritis                                                  [facility]

th

 

       Proteinuri Proteinuri Disease Active                                    [facility]

[facility]



       a      a                                                       Health







Allergies, Adverse Reactions, Alerts







       Allergy Allergy Status Severity Reaction(s) Onset  Inactive Treating Comm

ents 

Source



       Name   Type                        Date   Date   Clinician        

 

       Penicill Propensi Active        Shortness Of                       

[facility]



       ins    ty to                Breath                         Methodi



              adverse                      00:00:                      st



              reaction                      00                          



              s to                                                    



              drug                                                    

 

       Amoxicil Propensi Active                                     [facility]



       hansa-Pot ty to                                               Health



       Clavulan adverse                      00:00:                      



       ate    reaction                      00                          



              s to                                                    



              drug                                                    

 

       Penicill Propensi Active                                     [facility]



       ins    ty to                                               Health



              adverse                      00:00:                      



              reaction                      00                          



              s to                                                    



              drug                                                    







Family History







           Family Member Diagnosis  Comments   Start Date Stop Date  Source

 

           Natural father Heart failure                                  [facility]

 

           Maternal grandfather Diabetes                                    [facility]

 

           Maternal grandfather Heart failure                                  H

[facility]

 

           Maternal grandmother Stomach cancer                                  

[facility]

 

           Natural mother Cerebral aneurysm                                  [facility]

 

           Natural mother Heart failure                                  [facility]

 

           Natural mother Hyperthyroidism                                  [facility]

s [facility]







Social History







           Social Habit Start Date Stop Date  Quantity   Comments   Source

 

           Sex Assigned At                                             [facility]

alth



           Birth                                                  

 

           Alcohol Comment 2018 Occasionally            [facility]



                      00:00:00   00:00:00                         [facility]

 

           Alcohol intake 2017-10-01 2017-10-01 Current non-drinker            H

[facility]



                      00:00:00   00:00:00   of alcohol            



                                            (finding)             









                Smoking Status  Start Date      Stop Date       Source

 

                Never smoker                                    [facility]







Medications







       Ordered Filled Start  Stop   Current Ordering Indication Dosage Frequency

 Signature

                    Comments            Components          Source



     Medication Medication Date Date Medication? Clinician                (SIG) 

          



     Name Name                                                   

 

     hydroxychlo      2017      Yes            200mg QD   Take 200           [facility]

[facility]



     roquine      0-08                               mg by           [facility]



     (PLAQUENIL)      18:25:                               mouth           



     200 mg      57                                 daily.           



     tablet                                                        

 

     ferrous      2017      Yes            325mg QD   Take 325           [facility]

s



     sulfate 325      0-08                               mg by           [facility]



     mg (65 mg      18:25:                               mouth           



     iron)      57                                 daily           



     tablet                                         (with           



                                                  breakfast)           



                                                  .              

 

     aspirin      2017      Yes            81mg QD   Chew and           [facility]



     (ASPIRIN)      0-08                               swallow 81           [facility]

th



     81 mg      18:25:                               mg by           



     chewable      57                                 mouth           



     tablet                                         daily.           

 

     sertraline      2017      Yes            50mg QD   Take 50 mg           H

arrketan



     (ZOLOFT) 50      0-08                               by mouth           [facility]

th



     mg tablet      18:25:                               daily.           



               57                                                

 

     atovaquone      2017      Yes            1500mg QD   Take 10 mL          

 [facility]



     (MEPRON)      0-08                               by mouth           Health



     750 mg/5 mL      00:00:                               daily.           



     oral      00                                                



     suspension                                                        

 

     folic acid      2017      Yes            1mg  QD   Take 1           [facility]

s



     (FOLVITE) 1      0-08                               tablet by           [facility]

lth



     mg tablet      00:00:                               mouth           



               00                                 daily.           

 

     sennosides-      2017      Yes            1{tbl} QD   Take 1           [facility]

rris



     docusate      0-08                               tablet by           Health



     sodium      00:00:                               mouth           



     (SENNA      00                                 daily.           



     PLUS)                                                        



     8.6-50 mg                                                        



     tablet                                                        

 

     hydroxychlo      2017      Yes            200mg QD   Take 1           [facility]

ris



     roquine      0-08                               tablet by           [facility]



     (PLAQUENIL)      00:00:                               mouth           



     200 mg      00                                 daily.           



     tablet                                                        

 

     omeprazole      2017      Yes            40mg QD   Take 2           [facility]

s



     (PRILOSEC)      0-08                               capsules           He[facility]

h



     20 mg      00:00:                               by mouth           



     delayed      00                                 daily.           



     release                                                        



     capsule                                                        

 

     mycophenola      2017      Yes       Systemic 1500mg Q.5D Take 3         

  [facility]



     te        0-08                lupus           tablets by           [facility]



     (CELLCEPT)      00:00:                erythematos           mouth 2        

   



     500 mg      00                  us with           times           



     tablet                          glomerular           daily.           



                                   disease,                          



                                   unspecified                          



                                   SLE type                          

 

     predniSONE      2017      Yes            60mg QD   Take 3           [facility]

s



     (DELTASONE)      0-08                               tablets by           He

alth



     20 mg      00:00:                               mouth           



     tablet      00                                 daily Take           



                                                  60mg (3           



                                                  tablets)           



                                                  daily for           



                                                  4              



                                                  weeksTake           



                                                  50mg (2.5           



                                                  tablets)           



                                                  daily for           



                                                  2              



                                                  weeksThen           



                                                  take 40mg           



                                                  (2             



                                                  tablets)           



                                                  daily           



                                                  until seen           



                                                  in clinic.           

 

     ondansetron      2017      Yes            4mg       Take 1           [facility]

is



     (ZOFRAN) 4      0-08                               tablet by           [facility]

th



     mg tablet      00:00:                               mouth           



               00                                 every 8           



                                                  hours as           



                                                  needed for           



                                                  up to 5           



                                                  doses for           



                                                  Nausea.           







Procedures

This patient has no known procedures.



Plan of Care







             Planned Activity Planned Date Details      Comments     Source

 

             Future Scheduled 2020-10-01   IMM Influenza              [facility]



             Test         00:00:00     Seasonal Oct to              



                                       March (>/= 19 yrs)              



                                       [code = IMM               



                                       Influenza Seasonal              



                                       Oct to March (>/= 19              



                                       yrs)]                     

 

             Future Scheduled 2020   INFLUENZA VACCINE              [facility]



             Test         00:00:00     [code = INFLUENZA              



                                       VACCINE]                  

 

             Future Scheduled 2015   Screening for              [facility]

thodist



             Test         00:00:00     malignant neoplasm              



                                       of cervix                 



                                       (procedure) [code =              



                                       451947691]                

 

             Future Scheduled 2015   Screening for              [facility]

lt



             Test         00:00:00     malignant neoplasm              



                                       of cervix                 



                                       (procedure) [code =              



                                       724498590]                







Encounters







        Start   End     Encounter Admission Attending Care    Care    Encounter 

Source



        Date/Time Date/Time Type    Type    Clinicians Facility Department ID   

   

 

        2020 Emergency         [facility] Hernandez    [id number]

[id number] 



        17:16:57 20:21:00                 Caryl Cox [id number]         



                                                [place] [id number]         



                                                [place]  [phone]         



                                                        084             

 

        2020 Orders          Doctor  CARMELA    [id number]

40 



        00:00:00 00:00:00 Only            Unassigned, MALIKA   [id number]       

  



                                        [place] [id number]         



                                                        [phone]         



                                                        009             

 

        2017-11-10 2017-11-10 Outpatient                 [facility]     [id number]

72 [facility]



        00:00:00 00:00:00                                                 Health

 

        2017-10-03 2017-10-03 Outpatient                 [facility]     [id number]

78 [facility]



        07:37:31 07:37:31                                                 Health

 

        2017-10-01 2017-10-01 Outpatient                 [facility]     [id number]

67 [facility]



        07:21:26 07:21:26                                                 Health

 

        2017 Outpatient                 [facility]     [id number]

80 [facility]



        15:25:08 15:25:08                                                 Health

 

        2017 Outpatient                 [facility]     [id number]

51 [facility]



        10:38:27 10:38:27                                                 Health

 

        2017 Emergency                 [facility]     [id number]

2 [facility]



        04:19:42 04:19:42                                                 Health

 

        2017 [facility]                 [facility]     [id number]

0 [facility]



        00:52:05 00:52:05                                                 [facility]

 

        2017 Emergency                 [facility]     [id number]

4 [facility]



        18:41:35 18:41:35                                                 Health







Results

This patient has no known results.

## 2020-07-31 LAB
ALBUMIN SERPL BCP-MCNC: 2.9 G/DL (ref 3.4–5)
ALP SERPL-CCNC: 61 U/L (ref 45–117)
ALT SERPL W P-5'-P-CCNC: < 6 U/L (ref 12–78)
AST SERPL W P-5'-P-CCNC: 13 U/L (ref 15–37)
BLD SMEAR INTERP: (no result)
BUN BLD-MCNC: 41 MG/DL (ref 7–18)
CRP SERPL-MCNC: 6.92 MG/L (ref ?–3)
GLUCOSE SERPLBLD-MCNC: 84 MG/DL (ref 74–106)
HCT VFR BLD CALC: 29.6 % (ref 36–45)
HDLC SERPL-MCNC: 85 MG/DL (ref 40–60)
INR BLD: 0.97
LDLC SERPL CALC-MCNC: 10 MG/DL (ref ?–130)
LYMPHOCYTES # SPEC AUTO: 1.3 K/UL (ref 0.7–4.9)
MAGNESIUM SERPL-MCNC: 2 MG/DL (ref 1.8–2.4)
MORPHOLOGY BLD-IMP: (no result)
PMV BLD: 8.9 FL (ref 7.6–11.3)
POLYCHROMASIA BLD QL SMEAR: (no result)
POTASSIUM SERPL-SCNC: 3.9 MMOL/L (ref 3.5–5.1)
RBC # BLD: 2.86 M/UL (ref 3.86–4.86)

## 2020-07-31 PROCEDURE — 009U3ZX DRAINAGE OF SPINAL CANAL, PERCUTANEOUS APPROACH, DIAGNOSTIC: ICD-10-PCS

## 2020-07-31 RX ADMIN — Medication SCH: at 21:00

## 2020-07-31 RX ADMIN — HYDROCORTISONE SODIUM SUCCINATE SCH MG: 100 INJECTION, POWDER, FOR SOLUTION INTRAMUSCULAR; INTRAVENOUS at 17:00

## 2020-07-31 RX ADMIN — ANTACID TABLETS SCH MG: 500 TABLET, CHEWABLE ORAL at 09:41

## 2020-07-31 RX ADMIN — CALCITRIOL CAPSULES 0.25 MCG SCH MCG: 0.25 CAPSULE ORAL at 06:20

## 2020-07-31 RX ADMIN — ANTACID TABLETS SCH MG: 500 TABLET, CHEWABLE ORAL at 13:41

## 2020-07-31 RX ADMIN — ASPIRIN SCH: 81 TABLET, COATED ORAL at 09:00

## 2020-07-31 RX ADMIN — SODIUM CHLORIDE SCH MLS: 0.9 INJECTION, SOLUTION INTRAVENOUS at 05:04

## 2020-07-31 RX ADMIN — PANTOPRAZOLE SODIUM SCH MG: 40 TABLET, DELAYED RELEASE ORAL at 09:42

## 2020-07-31 RX ADMIN — AMLODIPINE BESYLATE SCH MG: 10 TABLET ORAL at 06:18

## 2020-07-31 RX ADMIN — HYDROXYCHLOROQUINE SULFATE SCH MG: 200 TABLET, FILM COATED ORAL at 09:50

## 2020-07-31 RX ADMIN — ANTACID TABLETS SCH MG: 500 TABLET, CHEWABLE ORAL at 21:10

## 2020-07-31 RX ADMIN — VENLAFAXINE SCH MG: 75 TABLET ORAL at 09:41

## 2020-07-31 RX ADMIN — IRON SUPPLEMENT SCH MG: 325 TABLET ORAL at 21:10

## 2020-07-31 RX ADMIN — METOPROLOL TARTRATE SCH MG: 25 TABLET ORAL at 17:00

## 2020-07-31 RX ADMIN — HYDROCORTISONE SODIUM SUCCINATE SCH MG: 100 INJECTION, POWDER, FOR SOLUTION INTRAMUSCULAR; INTRAVENOUS at 09:42

## 2020-07-31 RX ADMIN — SODIUM CHLORIDE SCH: 0.9 INJECTION, SOLUTION INTRAVENOUS at 16:20

## 2020-07-31 RX ADMIN — Medication SCH: at 09:00

## 2020-07-31 RX ADMIN — Medication SCH ML: at 09:50

## 2020-07-31 RX ADMIN — METOPROLOL TARTRATE SCH MG: 25 TABLET ORAL at 06:18

## 2020-07-31 RX ADMIN — IRON SUPPLEMENT SCH MG: 325 TABLET ORAL at 13:41

## 2020-07-31 RX ADMIN — Medication SCH ML: at 21:15

## 2020-07-31 RX ADMIN — IRON SUPPLEMENT SCH MG: 325 TABLET ORAL at 09:41

## 2020-07-31 NOTE — ER
Nurse's Notes                                                                                     

 [facility]                                                                 

Name: Jayde Mcduffie                                                                               

Age: 25 yrs                                                                                       

Sex: Female                                                                                       

: 1995                                                                                   

MRN: [id number]                                                                                   

Arrival Date: 2020                                                                          

Time: 22:32                                                                                       

Account#: [id number]                                                                            

Bed 18                                                                                            

Private MD:                                                                                       

Diagnosis: Vomiting, unspecified;Ataxia, unspecified                                              

                                                                                                  

Presentation:                                                                                     

                                                                                             

22:50 Chief complaint: Patient states: N/V HA started 30 minutes ago. Coronavirus screen:     ks7 

      Client denies travel out of the [place] in the last 14 days. vomiting. At this time, the       

      client does not indicate any symptoms associated with coronavirus-19. The client denies     

      any previous COVID testing. Ebola Screen: Patient negative for fever greater than or        

      equal to 101.5 degrees Fahrenheit, and additional compatible Ebola Virus Disease            

      symptoms Patient denies exposure to infectious person. Patient denies travel to an          

      Ebola-affected area in the 21 days before illness onset. Initial Sepsis Screen: Does        

      the patient meet any 2 criteria? No. Patient's initial sepsis screen is negative. Does      

      the patient have a suspected source of infection? No. Patient's initial sepsis screen       

      is negative.                                                                                

22:50 Method Of Arrival: Ambulatory                                                           ks7 

22:52 Risk Assessment: Do you want to hurt yourself or someone else? Patient reports no       ks7 

      desire to harm self or others. Onset of symptoms was 2020.                         

22:52 Acuity: ADRIAN 3                                                                           ks7 

                                                                                                  

Triage Assessment:                                                                                

22:54 Headache History: The patient has had previous headaches and this one is more severe    ks7 

      than previous episodes. General: Appears distressed, uncomfortable, Behavior is             

      cooperative. Pain: Pain currently is 10 out of 10 on a pain scale. Pain began 30 min        

      ago. Also complains of nausea. Neuro: No deficits noted.                                    

                                                                                                  

OB/GYN:                                                                                           

22:54  0, Full Term 0, Premature 0,  0, Living 0, LMP 2020                ks7 

                                                                                                  

Historical:                                                                                       

- Allergies:                                                                                      

22:54 PENICILLINS;                                                                            ks7 

- Home Meds:                                                                                      

22:54 Ferrex 150 Oral [Active]; Omeprazole Oral [Active]; Prednisone Oral [Active];           ks7 

- PMHx:                                                                                           

22:54 Anemia; Lupus; Dialysis;                                                                ks7 

                                                                                                  

- Immunization history:: Adult Immunizations up to date.                                          

- Social history:: Smoking status: Patient denies any tobacco usage or history of.                

                                                                                                  

                                                                                                  

Screenin:03 Abuse screen: Denies threats or abuse. Denies injuries from another. Nutritional        ks7 

      screening: No deficits noted. Tuberculosis screening: No symptoms or risk factors           

      identified. Fall Risk None identified.                                                      

                                                                                                  

Assessment:                                                                                       

23:03 Pain: Complains of pain in HA Pain currently is 10 out of 10 on a pain scale. Quality   ks7 

      of pain is described as aching, Pain began 30 min ago. Neuro: No deficits noted. GI: pt     

      with nausea and vomiting in ED.                                                             

23:12 General: Appears.                                                                       ks7 

23:53 Reassessment: PLT 57053. notified Jeannine KOCH face to face.                               ks7 

                                                                                             

01:04 General: Appears in no apparent distress. comfortable, Behavior is calm, cooperative.   mg2 

      Neuro: Reports headache frontal area, that is the "worst ever". Cardiovascular:             

      Capillary refill < 3 seconds Patient's skin is warm and dry. Respiratory: Airway is         

      patent Respiratory effort is even, unlabored, Respiratory pattern is regular,               

      symmetrical. GI: Reports vomiting. : No signs and/or symptoms were reported regarding     

      the genitourinary system. EENT: No signs and/or symptoms were reported regarding the        

      EENT system. Derm: Skin is intact, is healthy with good turgor, Skin is pink, warm \T\      

      dry. normal. Musculoskeletal: Circulation, motion, and sensation intact. Capillary          

      refill < 3 seconds, Reports weakness in whole body.                                         

01:05 General: Augusto () updated about the patient. [phone].                     mg2 

01:19 Reassessment: patient sent to ct scan via stretcher.                                    mg2 

01:58 Reassessment: patient verbalized that her headache is on and off and better now.        mg2 

02:30 Reassessment: Patient appears in no apparent distress at this time. No changes from     mg2 

      previously documented assessment. patient agreed to be admitted. provider also spoke to     

      the patient.                                                                                

03:00 Reassessment: Patient appears in no apparent distress at this time. Patient is alert,   rr5 

      oriented x 3, equal unlabored respirations, skin warm/dry/pink. awaiting for room           

      assignment Patient states symptoms have improved.                                           

04:02 Reassessment: Patient appears in no apparent distress at this time. Patient is alert,   rr5 

      oriented x 3, equal unlabored respirations, skin warm/dry/pink. for transfer to room        

      205 Patient denies pain at this time. Patient states feeling better. Patient states         

      symptoms have improved.                                                                     

                                                                                                  

Vital Signs:                                                                                      

                                                                                             

22:50 Resp 18; Pulse Ox 100% on R/A; Pain 10/10;                                              ks7 

23:01  / 107; Pulse 79; Resp 18; Temp 98.8(TE); Pulse Ox 100% on R/A; Weight 56.7 kg;   ks7 

      Height 4 ft. 11 in. (149.86 cm); Pain 10/10;                                                

23:34 Pulse Ox 100% ; Pain 8/10;                                                              ks7 

23:35  / 106; Pulse 90; Resp 18; Pulse Ox 100% on R/A; Pain 7/10;                       ks7 

                                                                                             

00:02  / 100; Pulse 80; Resp 18; Pulse Ox 100% on R/A; Pain 7/10;                       ks7 

00:55  / 109; Pulse 96; Resp 18; Pulse Ox 100% on R/A;                                  mg2 

02:04  / 105; Pulse 88; Resp 18; Pulse Ox 100% on R/A;                                  mg2 

02:48  / 101; Pulse 94; Resp 18; Pulse Ox 100% on R/A;                                  rr5 

03:46  / 102; Pulse 83; Resp 15; Temp 98.5; Pulse Ox 99% ;                              rr5 

                                                                                             

23:01 Body Mass Index 25.25 (56.70 kg, 149.86 cm)                                             ks7 

                                                                                             

23:35 headache improving                                                                      ks7 

                                                                                                  

Olga Coma Score:                                                                               

                                                                                             

01:14 Eye Response: spontaneous(4). Verbal Response: oriented(5). Motor Response: obeys       snw 

      commands(6). Total: 15.                                                                     

                                                                                                  

ED Course:                                                                                        

                                                                                             

22:32 Patient arrived in ED.                                                                  ag3 

22:39 [facility]                                                          snw 

22:39 Camron Paris MD is Attending Physician.                                                snw 

22:49 Karmen Harrison, RN is Primary Nurse.                                                ks7 

22:53 Triage completed.                                                                       ks7 

22:54 Arm band placed on.                                                                     ks7 

23:03 Patient has correct armband on for positive identification. Bed in low position. Call   ks7 

      light in reach. Side rails up X2.                                                           

23:03 No provider procedures requiring assistance completed.                                  ks7 

23:33 Gastric Occult Blood Sent.                                                              ks7 

23:33 Chem 7 Sent.                                                                            ks7 

23:33 CBC with Diff Sent.                                                                     ks7 

23:33 Phosphorus Sent.                                                                        ks7 

23:33 Magnesium Sent.                                                                         ks7 

23:34 Resting quietly. portable xray.                                                         ks7 

23:34 Inserted saline lock: 20 gauge in left antecubital area, using aseptic technique. Blood ks7 

      collected.                                                                                  

23:51 Chest Single View XRAY In Process Unspecified.                                          EDMS

                                                                                             

00:02 Report given to Talita MURRAY.                                                            ks7 

01:37 CT Head Brain wo Cont In Process Unspecified.                                           EDMS

02:33 Inocente Rico MD is Hospitalizing Provider.                                           snw 

04:02 Patient admitted, IV remains in place. intact, No redness/swelling at site.             rr5 

                                                                                                  

Administered Medications:                                                                         

                                                                                             

23:00 Drug: Phenergan 25 mg Route: IM; Site: right deltoid;                                   ks7 

23:34 Follow up: Pulse Ox 100% ; Pain 8/10 Adult; pt states she feels a little better. no     ks7 

      longer vomiting but still nauseous                                                          

                                                                                                  

                                                                                                  

Intake:                                                                                           

                                                                                                  

Outcome:                                                                                          

                                                                                             

02:34 Decision to Hospitalize by Provider.                                                    snw 

04:01 Admitted to Med/surg accompanied by nurse, via stretcher, room 205, with chart, Report  rr5 

      called to  [facility]                                                                          

04:01 Condition: stable                                                                           

04:01 Instructed on the need for admit.                                                           

04:32 Patient left the ED.                                                                    rr5 

                                                                                                  

Signatures:                                                                                       

Dispatcher [employer]                           EDJeannine Tapia, FNP-C                   FNP-Csnw                                                  

Reza Glover RN RN   mg2                                                  

Stephy Middleton3                                                  

Lucas Cotter RN RN   rr5                                                  

Karmen Harrison RN RN   ks7                                                  

                                                                                                  

Corrections: (The following items were deleted from the chart)                                    

                                                                                             

23:06 23:01 Pulse 79bpm; Resp 18bpm; Pulse Ox 100% RA; Temp 98.8F Temporal; 56.7 kg; Height 4 ks7 

      ft. 11 in.; BMI: 25.2; Pain 10/10; ks7                                                      

                                                                                                  

**************************************************************************************************

## 2020-07-31 NOTE — RAD REPORT
EXAM DESCRIPTION:  CT - Head Brain Wo Cont - 7/31/2020 2:26 am

 

 

******** ADDENDUM #1 ********

THIS REPORT CONTAINS FINDINGS THAT MAY BE CRITICAL TO PATIENT CARE:  The findings

were verbally discussed via telephone conference with Dr. Camron Paris   by Dr. Faisal Cunningham on 7/31
/2020 2:06 AM CDT .The results were acknowledged and understood.

Electronically signed by:   Lucy Cunningham MD   7/31/2020 2:06 AM CDT Workstation: [id number]

*** End of Addendum ***

 

EXAM DESCRIPTION:  CT Head Without Intravenous Contrast

 

CLINICAL HISTORY:  The patient is 25 years old and is Female; HEADACHE

 

TECHNIQUE:  Axial computed tomography images of the head/brain without intravenous contrast.   Sagitt
al and coronal reformatted images were created and reviewed.   This CT exam was performed using one o
r more of the following dose reduction techniques:   automated exposure control, adjustment of the mA
 and/or kV according to patient size, and/or use of iterative reconstruction technique.

 

COMPARISON:  CT and MRI of the head July 27, 2020

 

FINDINGS:  BRAIN:   Progression of the cortical and subcortical low-attenuation along the right super
ior parietal lobe is present. Subtle area of low attenuation within the cortical and subcortical rand
ons of the left superior frontal lobe is noted. No intracranial hemorrhage, mass effect, or midline s
hift is seen.

   VENTRICLES:   Unremarkable.   No ventriculomegaly.

   BONES/JOINTS:   No acute fracture.

   SOFT TISSUES:   Unremarkable.

   SINUSES:   Unremarkable as visualized.   No acute sinusitis.

   MASTOID AIR CELLS:   Unremarkable as visualized.   No mastoid effusion.

   ORBITS:   Unremarkable as visualized.

 

IMPRESSION:  Interval progression of the areas of cortical and subcortical low attenuation throughout
 the right parietal lobe and to a lesser extent the left frontal lobe. Findings are suggestive of enc
ephalitis. Further evaluation with an MRI without and with contrast is recommended.

 

Electronically signed by:   Lucy Cunningham MD   7/31/2020 1:56 AM CDT Workstation: [id number]

 

 

Due to temporary technical issues with the PACS/Fluency reporting system, reports are being signed by
 the in house radiologist without review as a courtesy to ensure prompt reporting. The interpreting r
adiologist is fully responsible for the content of the report.

## 2020-07-31 NOTE — RAD REPORT
EXAM DESCRIPTION:  Feli Single View7/30/2020 11:51 pm

 

CLINICAL HISTORY:  Chest pain

 

COMPARISON:  July 23rd

 

FINDINGS:   The lungs appear clear of acute infiltrate. The heart is normal size. Central venous cath
eter with its tip the superior vena cava

 

IMPRESSION:   No acute abnormalities displayed

## 2020-07-31 NOTE — RAD REPORT
EXAM DESCRIPTION:  MRI - Brain Wo Cont - 7/31/2020 12:48 pm

 

CLINICAL HISTORY:  Abnormal CT; ischemia; cerebritis; arteritis

 

Stroke protocol contrast study examination could not be performed due to abnormal renal function.

 

COMPARISON:  MRA Head Wo Cont dated 7/31/2020; Head Brain Wo Cont dated 7/31/2020; Brain Wo Cont date
d 7/27/2020

 

TECHNIQUE:  Sagittal T1-weighted images were obtained along with axial PD, heavily T2-weighted and T2
-FLAIR images. Axial DWI and ADC mapping sequences were also obtained along with coronal heavily T2-w
eighted images.

 

FINDINGS:  No acute infarction changes are present. No midline shift or significant mass effect ident
ifiable. Prior MRI of July 27 showed bilateral cerebral cortical and subcortical T2/IR signal abnorma
lity. The medial right parietal component is slightly worse than seen July 27th. Current examination 
shows a new area in the medial left occipital lobe. There is a new focus of signal abnormality at the
 lentiform nucleus anterior limb internal capsule junction on the left. No cavitation or abscess form
ation at this time. Cerebellum and brainstem or spared any signal abnormality. No extra-axial fluid c
ollections. Signal voids are seen as a normal finding in the major intracranial vessels.

 

No sella or supra sella abnormality. No globe or orbital content abnormality seen.

 

Mastoid air cells are clear. Paranasal sinus mucosal thickening changes are present. No air-fluid lev
el or paranasal sinus mass.

 

Cerebritis/encephalitis remains the most likely etiology. Signal abnormalities related to vasculitis/
arteritis also consideration. Infarction is not currently suspected. Malignancy etiology not suspecte
d but would be very uncommon in a patient this age.

 

IMPRESSION:  Cerebritis/encephalitis findings have progressed since July 27 imaging.

 

No abscess or cavitation at this time. Brainstem and cerebellum remains spared.

## 2020-07-31 NOTE — RAD REPORT
EXAM DESCRIPTION:  MRI - MRA Head Wo Cont - 7/31/2020 12:48 pm

 

CLINICAL HISTORY:  Cerebritis/arteritis, headache

 

COMPARISON:  MRI brain same date

 

TECHNIQUE:  Axial and coronal 3D time-of-flight image acquisition was performed. 3D rotational images
 were generated with source and reconstruction images reviewed. Horizontal and vertical axis rotation
al views generated using MIP protocol.

 

FINDINGS:  Exam suffers from motion degradation but is still considered to be diagnostic.

 

Small distal left vertebral artery is present. Basilar artery shows no focal stenosis or abnormal con
tour. The left posterior cerebral artery P1 segment is very small and slightly irregular. The patient
 does appear to have a large left posterior communicating artery which could explain this finding. Narendra
th posterior cerebral arteries show slightly irregular contour. No focal stenoses of the distal poste
rior cerebral artery distributions.

 

Cavernous portions of each internal carotid artery show slightly irregular contour. The right middle 
cerebral artery M2 and M3 branches show slight irregular contour changes. No focal stenosis or major 
branch occlusion on the right. Both anterior cerebral arteries show irregular contour of each A1 segm
ent. This is possibly due to the motion affects. Long segment significant narrowing of the left middl
e cerebral artery M2 and proximal M3 branches noted.

 

IMPRESSION:  Bilateral middle cerebral artery abnormalities of irregular contour and left middle cere
bral artery significant long segment stenoses.

 

Patient has irregular narrowing of the bilateral anterior cerebral artery A1 segments and left poster
ior cerebral P1 segments. This could be due to vascular disease or possibly the affects of motion for
 the anterior cerebral findings and anatomic variant for the left posterior cerebral finding.

 

Findings are consistent with vasculitis.

## 2020-07-31 NOTE — CON
Consultation called because of altered mental status and abnormal possible cerebritis in the central 
nervous system.



History Of Present Illness:  Ms. Mcduffie is a 25-year-old right-handed  patient with en
d-stage renal disease, on hemodialysis, hypertension, who was recently in hospital with seizure like 
activity and was discharged home on Keppra.  She said earlier today actually last night around 10 p.m
. she had just eaten 2 boiled eggs, then suddenly developed some fair headache rated 10/10, headache 
was on the whole head associated with light and sound sensitivity, nausea and vomiting and she said h
er  thought she was slightly disoriented.  He brought her [facility] and she had a he
ad CT scan done at 01:04 in the morning.  The study is actually reported out at 02:26 in the morning 
and showed interval progression of areas of cortical and subcortical low attenuation throughout the r
ight parietal lobe, to less extent the left frontal lobe suggestive of encephalitis.  Further evaluat
ion of MRI without and with contrast is recommended.  She did have the brain MRI done around 01:19 to
day in afternoon.  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
ly compared to the study done July 27th, although in that study it was not clear that she had a cereb
ritis or encephalitis just was read out as possible small-vessel disease.  In any event the patient s
aid that her headache resolved after about 6 hours and she denied receiving any medications for the h
eadache.  After discharge home at last service, she was on Keppra 500 mg twice daily, which she humble
nued.  She was put on aspirin 162 mg daily, Norvasc 10 mg daily, Lopressor 25 mg twice daily, and Eff
exor 75 mg daily, and she had hemodialysis today, which is her Monday, Wednesday, Friday and today is
 Friday, day for hemodialysis at this point.  She is back to baseline.  Denies any headaches, fevers,
 chills, any nausea, vomiting, any arthralgias or myalgias.  No rash or psychiatric issues.



Past Medical History:  As indicated, acute renal failure, congestive heart failure.



Surgical History:  Of a pericardial window in 2008 and right kidney biopsy and she has hemodialysis p
ort.



Allergies:  PENICILLIN.



Medications:  Currently Effexor 75 mg daily, Protonix 40 mg daily, Zofran 4 mg every 4 hours as neede
d, Lopressor 25 mg daily, Claritin 10 mg daily, Keppra 500 mg twice daily, Plaquenil 200 mg daily, So
kieran-Cortef 25 mg IV every 8 hours, Apresoline 10 mg IV every 4 hours as needed, ferrous sulfate 325 mg
 3 times daily, Nepro Shake 237 mg twice daily, Tums 500 mg 3 times daily, Rocaltrol 0.5 mcg every 48
 hours, Lipitor 40 mg at bedtime, aspirin 162 mg daily, Norvasc 10 mg daily, and Tylenol 500 mg every
 4 hours as needed.



Family History:  Heart disease and kidney disease in mother.



Social History:  She denies IV drugs, cocaine, marijuana, alcohol, or tobacco use.  She lives with he
r  in a single-family home.  She has no children.



Review of Systems:

She mentions headache, nausea, vomiting, light sound sensitivity, arthralgias.  No rash or psychiatri
c issues despite the possibility of a cerebritis or encephalitis.  No stiff neck.  No focal weakness 
in her face, arm, or leg.



Physical Examination:

Vital Signs:  Blood pressure 134/79, pulse of 82-97, respiratory rate 14 to 18, temperature 98.0, oxy
gen saturation 100% on room air. 

General:  Ms. Mcduffie is resting in bed.  She in on her iPad.  She is in no acute distress.  

HEENT:  She is normocephalic, atraumatic.  Her sclerae are anicteric.  Oropharynx is moist and pink. 


Neck:  Supple. 

Chest:  Clear. 

Heart:  Regular. 

Extremities:  Show no edema, cyanosis, or clubbing. 

Neurological:  She is alert and oriented to situation, place, and person.  She follows commands appro
priately.  She has no expressive or receptive aphasias.  Cranial nerves 2 through 12 are intact by ex
am.  Motor examination; she has full strength in the upper and lower extremities with no weakness pro
ximally or distally.  Coordination intact in upper and lower extremities.  Gait is intact.  She was a
mbulated with physical therapist.



Laboratory Studies:  Complete blood count with differential showed white blood cell count 7.3, hemogl
obin 10.2, platelets 77.  ESR of 78.  INR 0.97.  Chemistries show potassium 3.9, sodium 141, chloride
 108, carbon dioxide 28, BUN 41, creatinine 4.85, total bilirubin 1.7, AST low at 13, ALT less than 6
.  C-reactive protein elevated at 6.92, albumin low at 2.9, HDL cholesterol elevated at 85, LDL bernice
sterol of 10, total cholesterol 113, triglycerides 88.  Vitamin D level is low at 11.  Parathyroid ho
rmone level very elevated at 640.  She has a positive ANN with ANN titer greater than 1280.  She has 
positive double-stranded DNA of 10 and complement C3 is 38, complement C4 is low and C4 low at 5.  HI
V 1 and 2 antibody fourth generation is re-active, hepatitis B surface antibody is less than 5.  Her 
COVID test is negative.



Assessment:  Ms. 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  She has localization
-related seizures.  Also, she appears to be HIV positive.  She is COVID-19 negative.  She has hyperte
nsion, end-stage renal disease, on hemodialysis.



Plan:  Continue Keppra 500 mg twice daily.  Next, continue with steroid treatment for her lupus exace
rbation with elevated CRP and ESR.  She will likely benefit from HIV medications.  Continue aggressiv
e management of hypertension.  Hemodialysis will be continued as scheduled.





KURT/CRAIG

DD:  07/31/2020 16:23:43Voice ID:  [id number]

DT:  07/31/2020 20:19:36Report ID:  [id number]

## 2020-07-31 NOTE — EDPHYS
Physician Documentation                                                                           

 [facility]                                                                 

Name: Jayde Mcduffie                                                                               

Age: 25 yrs                                                                                       

Sex: Female                                                                                       

: 1995                                                                                   

MRN: [id number]                                                                                   

Arrival Date: 2020                                                                          

Time: 22:32                                                                                       

Account#: [id number]                                                                            

Bed 18                                                                                            

Private MD:                                                                                       

ED Physician Camron Paris                                                                         

HPI:                                                                                              

                                                                                             

23:23 This 25 yrs old Black Female presents to ER via Ambulatory with complaints of Headache, snw 

      Vomiting.                                                                                   

23:23 The patient complains of pain to the generalized. The patient describes the headache as snw 

      a pressure. Onset: The symptoms/episode began/occurred today. Associated signs and          

      symptoms: Pertinent positives: vomiting. The symptoms are alleviated by nothing. the        

      symptoms are aggravated by vomiting. The patient has not experienced similar symptoms       

      in the past. discharged from [facility], yesterday post 9 day admission for acute kidney           

      injury, anemia, began dialysis.                                                             

                                                                                                  

OB/GYN:                                                                                           

22:54  0, Full Term 0, Premature 0,  0, Living 0, LMP 2020                ks7 

                                                                                                  

Historical:                                                                                       

- Allergies:                                                                                      

22:54 PENICILLINS;                                                                            ks7 

- Home Meds:                                                                                      

22:54 Ferrex 150 Oral [Active]; Omeprazole Oral [Active]; Prednisone Oral [Active];           ks7 

- PMHx:                                                                                           

22:54 Anemia; Lupus; Dialysis;                                                                ks7 

                                                                                                  

- Immunization history:: Adult Immunizations up to date.                                          

- Social history:: Smoking status: Patient denies any tobacco usage or history of.                

                                                                                                  

                                                                                                  

ROS:                                                                                              

23:21 Eyes: Negative for injury, pain, redness, and discharge, ENT: Negative for injury,      snw 

      pain, and discharge, Neck: Negative for injury, pain, and swelling, Cardiovascular:         

      Negative for chest pain, palpitations, and edema, Respiratory: Negative for shortness       

      of breath, cough, wheezing, and pleuritic chest pain.                                       

23:21 Back: Negative for injury and pain, : Negative for injury, bleeding, discharge, and       

      swelling, MS/Extremity: Negative for injury and deformity, Skin: Negative for injury,       

      rash, and discoloration, Neuro: Negative for headache, weakness, numbness, tingling,        

      and seizure, Psych: Negative for depression, anxiety, suicide ideation, homicidal           

      ideation, and hallucinations.                                                               

23:21 Constitutional: Positive for malaise, poor PO intake.                                       

23:21 Abdomen/GI: Positive for nausea and vomiting.                                               

                                                                                                  

Exam:                                                                                             

23:21 Head/Face:  Normocephalic, atraumatic. Eyes:  Pupils equal round and reactive to light, snw 

      extra-ocular motions intact.  Lids and lashes normal.  Conjunctiva and sclera are           

      non-icteric and not injected.  Cornea within normal limits.  Periorbital areas with no      

      swelling, redness, or edema. ENT:  Nares patent. No nasal discharge, no septal              

      abnormalities noted.  Tympanic membranes are normal and external auditory canals are        

      clear.  Oropharynx with no redness, swelling, or masses, exudates, or evidence of           

      obstruction, uvula midline.  Mucous membranes moist. Neck:  Trachea midline, no             

      thyromegaly or masses palpated, and no cervical lymphadenopathy.  Supple, full range of     

      motion without nuchal rigidity, or vertebral point tenderness.  No Meningismus.             

      Chest/axilla:  Normal chest wall appearance and motion.  Nontender with no deformity.       

      No lesions are appreciated. Cardiovascular:  Regular rate and rhythm with a normal S1       

      and S2.  No gallops, murmurs, or rubs.  Normal PMI, no JVD.  No pulse deficits.             

      Respiratory:  Lungs have equal breath sounds bilaterally, clear to auscultation and         

      percussion.  No rales, rhonchi or wheezes noted.  No increased work of breathing, no        

      retractions or nasal flaring. Abdomen/GI:  Soft, non-tender, with normal bowel sounds.      

      No distension or tympany.  No guarding or rebound.  No evidence of tenderness               

      throughout. Back:  No spinal tenderness.  No costovertebral tenderness.  Full range of      

      motion. Skin:  Warm, dry with normal turgor.  Normal color with no rashes, no lesions,      

      and no evidence of cellulitis. MS/ Extremity:  Pulses equal, no cyanosis.                   

      Neurovascular intact.  Full, normal range of motion. Neuro:  Awake and alert, GCS 15,       

      oriented to person, place, time, and situation.  Cranial nerves II-XII grossly intact.      

      Motor strength 5/5 in all extremities.  Sensory grossly intact.  Cerebellar exam            

      normal.  Normal gait. Psych:  Awake, alert, with orientation to person, place and time.     

       Behavior, mood, and affect are within normal limits.                                       

23:21 Constitutional: The patient appears awake, well developed, in obvious distress,             

      moderately distressed.                                                                      

                                                                                                  

Vital Signs:                                                                                      

22:50 Resp 18; Pulse Ox 100% on R/A; Pain 10/10;                                              ks7 

23:01  / 107; Pulse 79; Resp 18; Temp 98.8(TE); Pulse Ox 100% on R/A; Weight 56.7 kg;   ks7 

      Height 4 ft. 11 in. (149.86 cm); Pain 10/10;                                                

23:34 Pulse Ox 100% ; Pain 8/10;                                                              ks7 

23:35  / 106; Pulse 90; Resp 18; Pulse Ox 100% on R/A; Pain 7/10;                       ks7 

                                                                                             

00:02  / 100; Pulse 80; Resp 18; Pulse Ox 100% on R/A; Pain 7/10;                       ks7 

00:55  / 109; Pulse 96; Resp 18; Pulse Ox 100% on R/A;                                  mg2 

02:04  / 105; Pulse 88; Resp 18; Pulse Ox 100% on R/A;                                  mg2 

02:48  / 101; Pulse 94; Resp 18; Pulse Ox 100% on R/A;                                  rr5 

03:46  / 102; Pulse 83; Resp 15; Temp 98.5; Pulse Ox 99% ;                              rr5 

                                                                                             

23:01 Body Mass Index 25.25 (56.70 kg, 149.86 cm)                                             ks7 

                                                                                             

23:35 headache improving                                                                      ks7 

                                                                                                  

Olga Coma Score:                                                                               

                                                                                             

01:14 Eye Response: spontaneous(4). Verbal Response: oriented(5). Motor Response: obeys       snw 

      commands(6). Total: 15.                                                                     

                                                                                                  

MDM:                                                                                              

                                                                                             

22:39 Patient medically screened.                                                             snw 

                                                                                             

01:14 Data reviewed: vital signs, nurses notes, lab test result(s). Counseling: I had a       snw 

      detailed discussion with the patient and/or guardian regarding: the historical points,      

      exam findings, and any diagnostic results supporting the discharge/admit diagnosis, the     

      presence of at least one elevated blood pressure reading (>120/80) during this              

      emergency department visit, lab results. Response to treatment: pt has decreased            

      headache, decreased nausea, up to void. Pt states she just feels off since she stood to     

      go to bathroom. Assisted to and then from bathroom. Dr. Paris at bedside. Labs improved     

      since admission 9 days ago however pt states she continues to feel "off". Some concern      

      for lupus cerebritis with recent imaging. Chronicity unknown. Will repeat CT. Pt states     

      she had "small stroke" in .                                                             

01:40 Physician consultation: Inocente Rico MD was called at 01:40, was contacted at 01:40,   snw 

      regarding patient's condition, awaiting CT head results, would like consultation with       

      Dr. Dr. Garnica.                                                                           

02:32 Physician consultation: Julio Garnica MD was called at 02:00, was contacted at 02:00, snw 

      regarding consult, patient's condition, Can keep for obs for further studies.               

                                                                                                  

                                                                                             

23:17 Order name: Magnesium; Complete Time: 00:44                                             snw 

                                                                                             

23:17 Order name: Phosphorus; Complete Time: 00:44                                            snw 

                                                                                             

23:17 Order name: CBC with Diff; Complete Time: 01:26                                         snw 

                                                                                             

23:17 Order name: Chem 7; Complete Time: 00:44                                                snw 

                                                                                             

23:23 Order name: Gastric Occult Blood; Complete Time: 00:44                                  sg  

                                                                                             

23:54 Order name: CBC Smear Scan; Complete Time: 01:26                                        EDMS

                                                                                             

02:35 Order name: C-Reactive Protein                                                          EDMS

                                                                                             

02:35 Order name: CBC with Automated Diff                                                     EDMS

                                                                                             

02:35 Order name: CBC with Automated Diff                                                     EDMS

                                                                                             

02:35 Order name: Comprehensive Metabolic Panel                                               EDMS

                                                                                             

02:35 Order name: Comprehensive Metabolic Panel                                               EDMS

                                                                                             

02:35 Order name: Lipid Profile                                                               EDMS

                                                                                             

02:35 Order name: Lipid Profile                                                               EDMS

                                                                                             

23:17 Order name: Chest Single View XRAY                                                      snw 

                                                                                             

01:04 Order name: CT Head Brain wo Cont                                                       snw 

                                                                                             

02:35 Order name: Echo with Doppler                                                           EDMS

                                                                                             

02:35 Order name: Echo with Doppler                                                           EDMS

                                                                                             

02:35 Order name: Magnesium                                                                   EDMS

                                                                                             

02:35 Order name: Magnesium                                                                   EDMS

                                                                                             

02:35 Order name: Phosphorus                                                                  EDMS

                                                                                             

02:35 Order name: Phosphorus                                                                  EDMS

                                                                                             

02:35 Order name: Protime (+INR)                                                              EDMS

                                                                                             

02:35 Order name: Protime (+INR)                                                              EDMS

                                                                                             

02:35 Order name: PTT, Activated Partial Thromb                                               EDMS

                                                                                             

02:35 Order name: PTT, Activated Partial Thromb                                               EDMS

                                                                                             

02:35 Order name: Sedimentation Rate, Deepergren                                              EDMS

                                                                                             

02:35 Order name: Sedimentation Rate, Deepergren                                              [facility]

                                                                                             

02:35 Order name: Stroke Protocol                                                             [facility]

                                                                                             

02:36 Order name: Lumbar Puncture For Dx                                                      [facility]

                                                                                             

23:17 Order name: Gastrocult; Complete Time: 23:33                                            snw 

                                                                                             

02:35 Order name: CONS Physician Consult                                                      [facility]

                                                                                             

02:35 Order name: Physical Therapy Consult                                                    [facility]

                                                                                             

02:36 Order name: Speech Therapy Consult                                                      [facility]

                                                                                             

02:36 Order name: Carotid Artery Bilateral                                                    EDMS

                                                                                                  

Administered Medications:                                                                         

                                                                                             

23:00 Drug: Phenergan 25 mg Route: IM; Site: right deltoid;                                   ks7 

23:34 Follow up: Pulse Ox 100% ; Pain 8/10 Adult; pt states she feels a little better. no     ks7 

      longer vomiting but still nauseous                                                          

                                                                                                  

                                                                                                  

Disposition:                                                                                      

                                                                                             

04:51 Co-signature as Attending Physician, Camron Paris MD.                                    rn  

                                                                                                  

Disposition:                                                                                      

20 02:34 Hospitalization ordered by Inocente Rico for Observation. Preliminary             

  diagnosis are Vomiting, unspecified, Ataxia, unspecified.                                       

- Bed requested for Telemetry/MedSurg (observation).                                              

- Status is Observation.                                                                      rr5 

- Condition is Stable.                                                                            

- Problem is an acute exacerbation.                                                               

- Symptoms are unchanged.                                                                         

                                                                                                  

                                                                                                  

                                                                                                  

Signatures:                                                                                       

Dispatcher [employer]                           [facility]                                                 

Janessa Pavon RN                        RN   Jeannine Melo, FNP-C                   FNP-Csnw                                                  

Camron Paris MD MD rn Roque, Raymond RN                      RN   rr5                                                  

Karmen Harrison RN                  RN   ks7                                                  

                                                                                                  

Corrections: (The following items were deleted from the chart)                                    

                                                                                             

23:18 23:17 CALCIUM+C.LAB.BRZ ordered. [facility]

                                                                                             

03:30 02:34 Hospitalization Ordered by Inocente Rico MD for Observation. Preliminary          mw  

      diagnosis is Vomiting, unspecified; Ataxia, unspecified. Bed requested for                  

      Telemetry/MedSurg (observation). Status is Observation. Condition is Stable. Problem is     

      an acute exacerbation. Symptoms are unchanged. snw                                          

04:32 03:30 2020 02:34 Hospitalization Ordered by Inocente Rico MD for Observation.     rr5 

      Preliminary diagnosis is Vomiting, unspecified; Ataxia, unspecified. Bed requested for      

      Telemetry/MedSurg (observation). Status is Observation. Condition is Stable. Problem is     

      an acute exacerbation. Symptoms are unchanged. mw                                           

                                                                                                  

**************************************************************************************************

## 2020-07-31 NOTE — RAD REPORT
EXAM DESCRIPTION:  USCarotid Artery Bilateral7/31/2020 11:55 am

 

CLINICAL HISTORY:  CVA

 

COMPARISON:   None

 

FINDINGS:  The velocity of the right internal carotid artery equals 91 cm/sec. The right ICA/CCA rati
o 1.3

 

The velocity of the left internal carotid artery equals 84 cm/sec. The left ICA/CCA ratio 1.1

 

No plaque is present within the carotid arteries.

 

The vertebral arteries demonstrate antegrade flow

 

Left thyroid nodule

 

IMPRESSION:  No plaque within the carotid arteries

 

Left thyroid nodule. Thyroid ultrasound recommended for further evaluation

 

NASCET criteria used.

 

Mild  0-49% stenosis

Moderate 50-69% stenosis

Severe 70-99% stenosis

## 2020-07-31 NOTE — RAD REPORT
EXAM DESCRIPTION:  RAD - Lumbar Spine 3 Views - 7/31/2020 11:49 am

 

CLINICAL HISTORY:  Back pain

 

FINDINGS:   The alignment of the lumbar spine is satisfactory.

 

No fracture or dislocation is seen.

 

No bone or joint abnormality seen

## 2020-08-01 LAB
ALBUMIN SERPL ELPH-MCNC: 2.6 G/DL (ref 3.8–4.8)
BUN BLD-MCNC: 50 MG/DL (ref 7–18)
GLUCOSE SERPLBLD-MCNC: 102 MG/DL (ref 74–106)
HCT VFR BLD CALC: 27.1 % (ref 36–45)
LYMPHOCYTES # SPEC AUTO: 1 K/UL (ref 0.7–4.9)
PMV BLD: 9.4 FL (ref 7.6–11.3)
POTASSIUM SERPL-SCNC: 4.1 MMOL/L (ref 3.5–5.1)
PROT PATTERN SERPL ELPH-IMP: (no result)
RBC # BLD: 2.6 M/UL (ref 3.86–4.86)

## 2020-08-01 RX ADMIN — Medication SCH ML: at 20:33

## 2020-08-01 RX ADMIN — HYDROCORTISONE SODIUM SUCCINATE SCH: 100 INJECTION, POWDER, FOR SOLUTION INTRAMUSCULAR; INTRAVENOUS at 17:00

## 2020-08-01 RX ADMIN — ASPIRIN SCH MG: 81 TABLET, COATED ORAL at 08:43

## 2020-08-01 RX ADMIN — IRON SUPPLEMENT SCH MG: 325 TABLET ORAL at 20:33

## 2020-08-01 RX ADMIN — Medication SCH ML: at 08:45

## 2020-08-01 RX ADMIN — ATORVASTATIN CALCIUM SCH MG: 40 TABLET, FILM COATED ORAL at 20:33

## 2020-08-01 RX ADMIN — IRON SUPPLEMENT SCH MG: 325 TABLET ORAL at 08:45

## 2020-08-01 RX ADMIN — AMLODIPINE BESYLATE SCH MG: 10 TABLET ORAL at 08:44

## 2020-08-01 RX ADMIN — VENLAFAXINE SCH: 75 TABLET ORAL at 08:46

## 2020-08-01 RX ADMIN — ANTACID TABLETS SCH MG: 500 TABLET, CHEWABLE ORAL at 08:44

## 2020-08-01 RX ADMIN — PANTOPRAZOLE SODIUM SCH MG: 40 TABLET, DELAYED RELEASE ORAL at 08:44

## 2020-08-01 RX ADMIN — METOPROLOL TARTRATE SCH: 25 TABLET ORAL at 17:05

## 2020-08-01 RX ADMIN — HYDROCORTISONE SODIUM SUCCINATE SCH MG: 100 INJECTION, POWDER, FOR SOLUTION INTRAMUSCULAR; INTRAVENOUS at 01:01

## 2020-08-01 RX ADMIN — Medication SCH: at 08:45

## 2020-08-01 RX ADMIN — Medication SCH: at 21:00

## 2020-08-01 RX ADMIN — ANTACID TABLETS SCH MG: 500 TABLET, CHEWABLE ORAL at 20:33

## 2020-08-01 RX ADMIN — HYDROXYCHLOROQUINE SULFATE SCH MG: 200 TABLET, FILM COATED ORAL at 08:43

## 2020-08-01 RX ADMIN — ANTACID TABLETS SCH MG: 500 TABLET, CHEWABLE ORAL at 14:04

## 2020-08-01 RX ADMIN — METOPROLOL TARTRATE SCH MG: 25 TABLET ORAL at 06:30

## 2020-08-01 RX ADMIN — IRON SUPPLEMENT SCH MG: 325 TABLET ORAL at 14:04

## 2020-08-01 RX ADMIN — HYDROCORTISONE SODIUM SUCCINATE SCH MG: 100 INJECTION, POWDER, FOR SOLUTION INTRAMUSCULAR; INTRAVENOUS at 08:44

## 2020-08-01 NOTE — P.PN
Subjective


Date of Service: 08/01/20


Subjective: No new changes, No C/O voiced, Improving





PATIENT IS DOING WELL.  CONTINUE WITH IV STEROIDS AT THIS TIME.  HEMODIALYSIS 

PER NEPHROLOGY; PATIENT NEEDED CONTINUE WITH OUTPATIENT FOLLOW WITH HEMATOLOGY 

AND RHEUMATOLOGY ALONG WITH NEPHROLOGY FOR HEMODIALYSIS.  PLAN FOR LP MONDAY 

MORNING- WILL NEED TO GET PLATELET TRANSFUSION PRIOR TO THAT





Review of Systems


10-point ROS is otherwise unremarkable





Physical Examination





- Vital Signs


Temperature: 97.5 F


Blood Pressure: 201/105


Pulse: 78


Respirations: 14


Pulse Ox (%): 100





- Physical Exam


General: Alert, In no apparent distress, Oriented x3


Respiratory: Clear to auscultation bilaterally, Normal air movement


Cardiovascular: Regular rate/rhythm, Normal S1 S2, No murmurs


Gastrointestinal: Normal bowel sounds, Soft and benign, Non-distended, No 

tenderness


Musculoskeletal: No clubbing, No swelling, No tenderness


Neurological: Normal speech, Normal strength at 5/5 x4 extr, Normal tone, 

Sensation intact, Cranial nerves 3-12 intact





- Studies


Medications List Reviewed: Yes





Assessment & Plan





- Problems (Diagnosis)


(1) Lupus nephritis


Current Visit: Yes   Status: Acute   





(2) ESRD (end stage renal disease)


Current Visit: Yes   Status: Acute   





(3) Altered mental status


Current Visit: Yes   Status: Acute   





(4) Near syncope


Current Visit: Yes   Status: Acute   





(5) Lupus cerebritis


Current Visit: Yes   Status: Acute   





(6) Lupus vasculitis


Current Visit: Yes   Status: Acute   





- Plan





Plan:


CONTINUE WITH PLAN OF CARE AS MENTIONED BELOW


1. Anti-inflammatory along with immunosuppressants.  


2. Anti-platelet therapy and statin therapy


3. Stroke protocol MRI performed with findings suggestive of vasculitis and 

cerebritis


4. Discuss with Radiology regarding lumbar puncture;


5. Hemodialysis per Nephrology today


6. Resumed immunosuppressant including CellCept and Plaquenil


7. Monitor LFTs


8. Strict blood pressure control


9. GI and DVT prophylaxis


Discharge Plan: Home


Plan to discharge in: Greater than 2 days





- Advance Directives


Does patient have a Living Will: No


Does patient have a Durable POA for Healthcare: No





- Code Status/Comfort Care


Code Status: Full Code


Critical Care: No


Time Spent Managing PTS Care (In Minutes): 35

## 2020-08-01 NOTE — CON
Date of Consultation:  07/31/2020



Chief Complaint:  End-stage renal disease, on dialysis



History Of Present Illness:  The patient recently was initiated on dialysis for severe hyperazotemia 
with end-stage renal disease.  The patient has history of lupus nephritis and previously she was seen
 by nephrologist in [place].  The patient is a 25-year-old female and recently was discharged from [facility] after she was diagnosed with end-stage renal disease.  She has a long-term history with kieran
pus.  CT scan of the brain was done when she arrived to the emergency room because of altered mental 
status.  She developed progressively worse confusion.  Hospital service physician discussed case with
 neurologist and he recommended to admit the patient and proceed with LP.  MRI stroke protocol was do
ne as well without gadolinium contrast due to the finding that the patient has end-stage renal diseas
e.



Review of Systems:

Unobtainable.  The patient is confused.  She cannot provide review of systems.



Past Medical History:  Lupus, SLE, end-stage renal disease, recently diagnosed, started on dialysis v
ia a tunneled dialysis catheter, right kidney biopsy, pericardial window, anemia.



Family History:  Mother with heart disease, kidney disease, and diabetes.  Mother with diabetes.



Social History:  Never smoked.  Denies alcohol.  Denies drug.



Physical Examination:

Vital Signs:  Blood pressure 180/100, respiratory rate 14, heart rate 78. 

Eyes:  EOMI. 

HENT:  Atraumatic.  Oral mucosa moist, pink. 

Respiratory:  Clear to auscultation bilaterally. 

Heart:  S1, S2.  No pericardial friction rub. 

Gastrointestinal:  There is no rebound.  No guarding. 

Musculoskeletal:  No tenderness.  No synovitis. 

Skin:  Warm and dry.  No skin rashes.  No oozing.



Laboratory Data:  WBC 6.5, hemoglobin 10.4, hematocrit 31, platelet count 89.  Sodium 141, potassium 
4.4, BUN 38, creatinine 4.73, glucose 115, phosphorus 3.



Impression And Plan:  

1.Lupus and systemic lupus erythematosus.  The patient developed mental status changes and Neurology
 conducting workup.  The patient will have LP.  Dialysis will be scheduled for tomorrow.

2.Anemia.  Continue ELMER.  Monitor hemoglobin level.

3.Near syncope.  The patient will have cardiac workup to rule out acute coronary syndrome.

4.Hypertension, accelerated, uncontrolled.  Continue IV medications.

5.Renal osteodystrophy.  Continue renal diet and binders.  Advance p.o. intake as tolerated and spee
ch therapy will assist with evaluation to rule out aspiration.





EB/MODL

DD:  07/31/2020 20:30:33Voice ID:  [id number]

DT:  08/01/2020 00:20:11Report ID:  [id number]

## 2020-08-02 LAB
BUN BLD-MCNC: 44 MG/DL (ref 7–18)
GLUCOSE SERPLBLD-MCNC: 145 MG/DL (ref 74–106)
HCT VFR BLD CALC: 29.9 % (ref 36–45)
LYMPHOCYTES # SPEC AUTO: 0.5 K/UL (ref 0.7–4.9)
PMV BLD: 9.2 FL (ref 7.6–11.3)
POTASSIUM SERPL-SCNC: 3.5 MMOL/L (ref 3.5–5.1)
RBC # BLD: 2.91 M/UL (ref 3.86–4.86)

## 2020-08-02 PROCEDURE — 5A1D70Z PERFORMANCE OF URINARY FILTRATION, INTERMITTENT, LESS THAN 6 HOURS PER DAY: ICD-10-PCS

## 2020-08-02 RX ADMIN — ASPIRIN SCH MG: 81 TABLET, COATED ORAL at 08:04

## 2020-08-02 RX ADMIN — METHYLPREDNISOLONE SODIUM SUCCINATE SCH MLS: 1 INJECTION, POWDER, FOR SOLUTION INTRAMUSCULAR; INTRAVENOUS at 09:54

## 2020-08-02 RX ADMIN — ANTACID TABLETS SCH MG: 500 TABLET, CHEWABLE ORAL at 08:03

## 2020-08-02 RX ADMIN — VENLAFAXINE SCH: 75 TABLET ORAL at 08:03

## 2020-08-02 RX ADMIN — CALCITRIOL CAPSULES 0.25 MCG SCH MCG: 0.25 CAPSULE ORAL at 06:30

## 2020-08-02 RX ADMIN — PANTOPRAZOLE SODIUM SCH MG: 40 TABLET, DELAYED RELEASE ORAL at 08:04

## 2020-08-02 RX ADMIN — IRON SUPPLEMENT SCH MG: 325 TABLET ORAL at 13:09

## 2020-08-02 RX ADMIN — HYDROCORTISONE SODIUM SUCCINATE SCH MG: 100 INJECTION, POWDER, FOR SOLUTION INTRAMUSCULAR; INTRAVENOUS at 00:43

## 2020-08-02 RX ADMIN — Medication SCH MG: at 10:38

## 2020-08-02 RX ADMIN — Medication SCH ML: at 21:37

## 2020-08-02 RX ADMIN — Medication SCH: at 09:00

## 2020-08-02 RX ADMIN — HYDROXYCHLOROQUINE SULFATE SCH MG: 200 TABLET, FILM COATED ORAL at 08:04

## 2020-08-02 RX ADMIN — Medication SCH MG: at 21:37

## 2020-08-02 RX ADMIN — METOPROLOL TARTRATE SCH MG: 25 TABLET ORAL at 17:10

## 2020-08-02 RX ADMIN — IRON SUPPLEMENT SCH MG: 325 TABLET ORAL at 08:04

## 2020-08-02 RX ADMIN — METOPROLOL TARTRATE SCH MG: 25 TABLET ORAL at 06:30

## 2020-08-02 RX ADMIN — ANTACID TABLETS SCH MG: 500 TABLET, CHEWABLE ORAL at 13:09

## 2020-08-02 RX ADMIN — ANTACID TABLETS SCH MG: 500 TABLET, CHEWABLE ORAL at 21:36

## 2020-08-02 RX ADMIN — ATORVASTATIN CALCIUM SCH MG: 40 TABLET, FILM COATED ORAL at 21:36

## 2020-08-02 RX ADMIN — AMLODIPINE BESYLATE SCH MG: 10 TABLET ORAL at 08:04

## 2020-08-02 RX ADMIN — ACETAMINOPHEN PRN MG: 500 TABLET, FILM COATED ORAL at 08:04

## 2020-08-02 RX ADMIN — IRON SUPPLEMENT SCH MG: 325 TABLET ORAL at 21:36

## 2020-08-02 RX ADMIN — Medication SCH: at 21:00

## 2020-08-02 NOTE — P.PN
Subjective


Date of Service: 08/02/20





PATIENT WILL CONTINUE WITH IV STEROIDS AT THIS TIME.  SOLU-MEDROL 1 G IVPB DAILY

X3 DAYS FOR MANAGEMENT OF NEUROPSYCHIATRIC SLE-VASCULITIS AND CEREBRITIS. WILL 

TYPE AND SCREEN FOR PLATELET TRANSFUSION MONDAY MORNING PRIOR TO LUMBAR 

PUNCTURE.  WILL DISCUSS WITH NEPHROLOGY REGARDING GETTING HEMODIALYSIS ON MONDAY

AFTER PLATELET TRANSFUSION.  OUTPATIENT FOLLOW WITH HEMATOLOGY AND NEPHROLOGY.





Physical Examination





- Vital Signs


Temperature: 97.5 F


Blood Pressure: 201/105


Pulse: 78


Respirations: 14


Pulse Ox (%): 100





- Studies


Medications List Reviewed: Yes





Assessment & Plan





- Problems (Diagnosis)


(1) Lupus nephritis


Current Visit: Yes   Status: Acute   





(2) ESRD (end stage renal disease)


Current Visit: Yes   Status: Acute   





(3) Altered mental status


Current Visit: Yes   Status: Acute   





(4) Near syncope


Current Visit: Yes   Status: Acute   





(5) Lupus cerebritis


Current Visit: Yes   Status: Acute   





(6) Lupus vasculitis


Current Visit: Yes   Status: Acute   





- Plan





Plan:


1. Anti-inflammatory along with immunosuppressants.  IV steroids-Solu-Medrol 1 g

IVPB x 3 days


2. Anti-platelet therapy and statin therapy


3. Stroke protocol MRI performed with findings suggestive of vasculitis and 

cerebritis


4. Discuss with Radiology regarding lumbar puncture;


5. Hemodialysis per Nephrology


6. Resume immunosuppressant including CellCept and Plaquenil


7. Monitor LFTs


8. Strict blood pressure control


9. GI and DVT prophylaxis





- Advance Directives


Does patient have a Living Will: No


Does patient have a Durable POA for Healthcare: No





- Code Status/Comfort Care


Code Status: Full Code

## 2020-08-02 NOTE — P.PN
Date of Service: 08/02/20





HIV 4TH GENERATION TEST WAS REACTIVE; HOWEVER, ALL ANTIBODY TESTING AND HIV RNA 

TEST WERE NEGATIVE; PATIENT DOES NOT HAVE HIV. ID CONSULTED; AWAITING THEIR 

INPUT AS WELL

## 2020-08-03 VITALS — OXYGEN SATURATION: 100 %

## 2020-08-03 LAB
ANISOCYTOSIS BLD QL: (no result)
BLD SMEAR INTERP: (no result)
BUN BLD-MCNC: 56 MG/DL (ref 7–18)
GLUCOSE SERPLBLD-MCNC: 128 MG/DL (ref 74–106)
HCT VFR BLD CALC: 31.1 % (ref 36–45)
LYMPHOCYTES # SPEC AUTO: 0.6 K/UL (ref 0.7–4.9)
MAGNESIUM SERPL-MCNC: 1.9 MG/DL (ref 1.8–2.4)
MORPHOLOGY BLD-IMP: (no result)
PMV BLD: 9.1 FL (ref 7.6–11.3)
POTASSIUM SERPL-SCNC: 4 MMOL/L (ref 3.5–5.1)
RBC # BLD: 3.09 M/UL (ref 3.86–4.86)
SPECIMEN VOL FLD: 7.5 ML

## 2020-08-03 PROCEDURE — 30233R1 TRANSFUSION OF NONAUTOLOGOUS PLATELETS INTO PERIPHERAL VEIN, PERCUTANEOUS APPROACH: ICD-10-PCS

## 2020-08-03 RX ADMIN — ANTACID TABLETS SCH MG: 500 TABLET, CHEWABLE ORAL at 20:24

## 2020-08-03 RX ADMIN — METHYLPREDNISOLONE SODIUM SUCCINATE SCH: 1 INJECTION, POWDER, FOR SOLUTION INTRAMUSCULAR; INTRAVENOUS at 09:00

## 2020-08-03 RX ADMIN — METOPROLOL TARTRATE SCH MG: 25 TABLET ORAL at 05:09

## 2020-08-03 RX ADMIN — AMLODIPINE BESYLATE SCH MG: 10 TABLET ORAL at 09:07

## 2020-08-03 RX ADMIN — Medication SCH MG: at 09:08

## 2020-08-03 RX ADMIN — VENLAFAXINE SCH: 75 TABLET ORAL at 09:00

## 2020-08-03 RX ADMIN — ANTACID TABLETS SCH MG: 500 TABLET, CHEWABLE ORAL at 09:07

## 2020-08-03 RX ADMIN — Medication SCH ML: at 20:26

## 2020-08-03 RX ADMIN — PANTOPRAZOLE SODIUM SCH MG: 40 TABLET, DELAYED RELEASE ORAL at 09:07

## 2020-08-03 RX ADMIN — METOPROLOL TARTRATE SCH: 25 TABLET ORAL at 17:23

## 2020-08-03 RX ADMIN — Medication SCH MG: at 20:25

## 2020-08-03 RX ADMIN — IRON SUPPLEMENT SCH MG: 325 TABLET ORAL at 14:16

## 2020-08-03 RX ADMIN — ANTACID TABLETS SCH MG: 500 TABLET, CHEWABLE ORAL at 14:16

## 2020-08-03 RX ADMIN — ACETAMINOPHEN PRN MG: 500 TABLET, FILM COATED ORAL at 09:06

## 2020-08-03 RX ADMIN — HYDROXYCHLOROQUINE SULFATE SCH MG: 200 TABLET, FILM COATED ORAL at 09:07

## 2020-08-03 RX ADMIN — Medication SCH ML: at 09:08

## 2020-08-03 RX ADMIN — ATORVASTATIN CALCIUM SCH MG: 40 TABLET, FILM COATED ORAL at 20:24

## 2020-08-03 RX ADMIN — Medication SCH: at 09:00

## 2020-08-03 RX ADMIN — IRON SUPPLEMENT SCH MG: 325 TABLET ORAL at 20:24

## 2020-08-03 RX ADMIN — ASPIRIN SCH: 81 TABLET, COATED ORAL at 09:00

## 2020-08-03 RX ADMIN — IRON SUPPLEMENT SCH MG: 325 TABLET ORAL at 09:07

## 2020-08-03 RX ADMIN — Medication SCH: at 20:25

## 2020-08-03 NOTE — PN
Date of Progress Note:  08/03/2020



Subjective:  Ms. Mcduffie reports no headaches, dizziness, blurred vision, loss of vision, weakness or n
umbness in her face, arm, and leg areas.  She is doing well.  She has no new complaints.



Objective:  Vital Signs:  Blood pressure 132/85, pulse 91, respiratory rate 16, temperature 97, oxyge
n saturation 100% on room air. 

General:  Ms. Mcduffie is resting comfortably in bed, on iPad. 

Neurologic:  She has no focal cranial nerve, motor, coordination, sensory, reflex, or gait deficits.



Laboratory Studies:  Complete blood count with differential shows white blood cell count 5.9, hemoglo
bin 10.6, and her platelets have improved 137 after platelet transfusion in preparation for lumbar pu
ncture.  Her INR was 0.97 on 07/31.  Chemistries today show potassium 4.0, chloride 109, sodium 141, 
creatinine is 5.14 and she is on hemodialysis, glucose up to 145, calcium 8.7, phosphorus 3.2, magnes
ium 1.9.  She had lumbar puncture done today.  Glucose was 72, protein 42.  It was clear, non-xanthoc
hromic.  There are pending studies.  She has a pending hepatitis panel, also CSF electrophoresis is p
ending, and she is already negative for COVID-19.



Assessment:  Ms. Mcduffie is a 25-year-old patient with end-stage renal disease, on hemodialysis, lupus 
nephritis, and likely also cerebritis with resolved multiple symptoms.  She has possible seizures and
 lumbar puncture so far is unremarkable.  No followup studies.



Plan:  She is on Lipitor 40 mg at bedtime, aspirin 162 mg daily, Norvasc 10 mg daily, also Keppra 500
 mg twice daily, and she is 

receiving methylprednisolone 1000 mg daily for 3 days and Plaquenil 200 mg daily.  She will be follow
ed while in hospital.





KURT/CRAIG

DD:  08/03/2020 19:00:52Voice ID:  [id number]

DT:  08/03/2020 22:39:41Report ID:  [id number]

## 2020-08-03 NOTE — P.PN
Subjective


Date of Service: 08/03/20


 Patient continues to do well with no new complaints. Platelet transfusion 

today; LP afterwards, then HD; after last dose of steroids possible DC home in 

the AM





Review of Systems


10-point ROS is otherwise unremarkable





Physical Examination





- Vital Signs


Temperature: 97.5 F


Blood Pressure: 140/91


Pulse: 91


Respirations: 18


Pulse Ox (%): 100





- Physical Exam


General: Alert, In no apparent distress, Oriented x3


Respiratory: Clear to auscultation bilaterally, Normal air movement


Cardiovascular: Regular rate/rhythm, Normal S1 S2, No murmurs


Gastrointestinal: Normal bowel sounds, Soft and benign, Non-distended, No 

tenderness


Musculoskeletal: No clubbing, No swelling, No tenderness


Neurological: Normal tone, Sensation intact, Cranial nerves 3-12 intact


Lymphatics: No axilla or inguinal lymphadenopathy





- Studies


Medications List Reviewed: Yes





Assessment & Plan





- Problems (Diagnosis)


(1) Lupus nephritis


Current Visit: Yes   Status: Acute   





(2) ESRD (end stage renal disease)


Current Visit: Yes   Status: Acute   





(3) Altered mental status


Current Visit: Yes   Status: Acute   





(4) Near syncope


Current Visit: Yes   Status: Acute   





(5) Lupus cerebritis


Current Visit: Yes   Status: Acute   





(6) Lupus vasculitis


Current Visit: Yes   Status: Acute   





- Plan





Plan:


CONTINUE WITH PLAN OF CARE AS MENTIONED BELOW


1. Anti-inflammatory along with immunosuppressants.  Last dose of pulsed 

steroids in the AM


2. Anti-platelet therapy and statin therapy


3. Stroke protocol MRI performed with findings suggestive of vasculitis and 

cerebritis; LP today after platelet transfusion


4. Discussed with Radiology regarding lumbar puncture; to be performed today


5. Hemodialysis per Nephrology today


6. Resumed immunosuppressant including CellCept and Plaquenil


7. Monitor LFTs


8. Strict blood pressure control


9. GI and DVT prophylaxis


Discharge Plan: Home


Plan to discharge in: 24 Hours





- Advance Directives


Does patient have a Living Will: No


Does patient have a Durable POA for Healthcare: No





- Code Status/Comfort Care


Code Status: Full Code


Critical Care: No


Time Spent Managing PTS Care (In Minutes): 35

## 2020-08-03 NOTE — P.CNS
Date of Consult: 08/03/20


Subjective: Patient is a 25 year old female who was recently discharged from the

hospital after being diagnosed with Lupus nephritis. Patient has had lupus since

she was 15 years old. Patient presented with some confusion, MRI showed 

vasculitis and cerebritis. Patient to have lumbar puncture today. Patients HIV 

4th generation test was reactive, which I was consulted for. 





Past medical/surgical history: 


-: Lupus


-: Acute renal failure/ ESRD/dialysis pt


-: Congestive heart failure status post pericardial window in 2008


-: Anemia


-: Right kidney biopsy


-: Pericardial window





- Family History


  ** Mother


Medical History: Heart disease, Kidney disease





  ** Aunt


Medical History: Diabetes





  ** Grandmother


Medical History: Diabetes





- Social History


Smoking Status: Never smoker


Alcohol use: No


CD- Drugs: No


Caffeine use: No


Place of Residence: Home





Allergies





Penicillins Allergy (Verified 07/31/20 04:41)


   Hives/Rash


Active Medications





Acetaminophen (Tylenol -Extra Strength)  500 mg PO Q4HP PRN


   PRN Reason: pain/fever


   Stop: 08/30/20 02:17


   Last Admin: 08/02/20 08:04 Dose:  500 mg


   Documented by: 


Amlodipine Besylate (Norvasc)  10 mg PO DAILY [facility]


   Stop: 08/30/20 06:01


   Last Admin: 08/02/20 08:04 Dose:  10 mg


   Documented by: 


Aspirin (Aspirin Ec)  162 mg PO DAILY [facility]


   Stop: 08/30/20 09:01


   Last Admin: 08/02/20 08:04 Dose:  162 mg


   Documented by: 


Atorvastatin Calcium (Lipitor)  40 mg PO BEDTIME [facility]


   Stop: 08/31/20 21:01


   Last Admin: 08/02/20 21:36 Dose:  40 mg


   Documented by: 


Calcitriol (Rocaltrol)  0.5 mcg PO Q48H [facility]


   Stop: 08/30/20 06:01


   Last Admin: 08/02/20 06:30 Dose:  0.5 mcg


   Documented by: 


Calcium Carbonate/Glycine (Tums Regular)  500 mg PO TID [facility]


   Stop: 08/30/20 09:01


   Last Admin: 08/02/20 21:36 Dose:  500 mg


   Documented by: 


Enteral Nutritional Formula (Nepro Shake)  237 ml PO BID [facility]


   Stop: 08/30/20 09:01


   Last Admin: 08/02/20 21:00 Dose:  Not Given


   Documented by: 


Ferrous Sulfate (Feosol)  325 mg PO TID [facility]


   Stop: 08/30/20 09:01


   Last Admin: 08/02/20 21:36 Dose:  325 mg


   Documented by: 


Heparin Sodium (Porcine) (Heparin 1,000 Units/Ml)  6,000 unit IV EVERY HD PRN


   PRN Reason: HD


   Last Admin: 08/01/20 17:38 Dose:  6,000 unit


   Documented by: 


Home Med (Mycophenolate Mofetil [Cellcept])  500 mg PO BID [facility]


   Stop: 08/30/20 09:01


   Last Admin: 08/02/20 21:37 Dose:  500 mg


   Documented by: 


Hydralazine HCl (Apresoline)  10 mg IV Q4HP PRN


   PRN Reason: Titrate to SBP (MUST DEFINE)


   Stop: 08/30/20 08:52


   Last Admin: 07/31/20 09:42 Dose:  10 mg


   Documented by: 


Hydroxychloroquine Sulfate (Plaquenil)  200 mg PO DAILY [facility]


   Stop: 08/30/20 09:01


   Last Admin: 08/02/20 08:04 Dose:  200 mg


   Documented by: 


Methylprednisolone Sodium Succinate 1,000 mg/ Sodium Chloride  100 mls @ 50 

mls/hr IV DAILY [facility]


   Stop: 08/04/20 10:59


   Last Admin: 08/02/20 09:54 Dose:  100 mls


   Documented by: 


Levetiracetam (Keppra Tab)  500 mg PO BID [facility]


   Stop: 08/30/20 09:01


   Last Admin: 08/02/20 21:36 Dose:  500 mg


   Documented by: 


Loratadine (Claritin)  10 mg PO DAILY PRN


   PRN Reason: ALLERGIES


   Stop: 08/30/20 05:36


Metoprolol Tartrate (Lopressor)  25 mg PO BID 6AM 6PM [facility]


   Stop: 08/30/20 06:01


   Last Admin: 08/03/20 05:09 Dose:  25 mg


   Documented by: 


Ondansetron HCl (Zofran)  4 mg IV Q6HP PRN


   PRN Reason: NAUSEA / VOMITING


   Stop: 08/30/20 02:17


Pantoprazole Sodium (Protonix  Tab)  40 mg PO DAILY [facility]; Protocol


   Stop: 08/30/20 09:01


   Last Admin: 08/02/20 08:04 Dose:  40 mg


   Documented by: 


Sodium Chloride (Normal Saline Flush)  10 ml IV BID [facility]


   Stop: 08/30/20 09:01


   Last Admin: 08/02/20 21:37 Dose:  10 ml


   Documented by: 


Venlafaxine HCl (Effexor)  75 mg PO DAILY [facility]


   Stop: 08/30/20 09:01


   Last Admin: 08/02/20 08:03 Dose:  Not Given


   Documented by: 





ROS:


CV: Denies chest pain


RESP: Denies shortness of breath and cough


: Denies dysuria


GI: Denies nausea and diarrhea


Neuro: reports headache





Objective:














Temp Pulse Resp BP Pulse Ox


 


 97 F   80   18   169/100 H  100 


 


 08/03/20 08:00  08/03/20 08:00  08/03/20 08:00  08/03/20 08:00  08/03/20 08:00








Labs: Na 141, K 4.0, BUN 56, Creat 5.14, Albumin 2.9, WBC 9.9, Hgb 10.6, Hct 

31.1, Plt 137





Brain MRI 7/31: 


EXAM DESCRIPTION:  MRI - MRA Head Wo Cont - 7/31/2020 12:48 pm


 


CLINICAL HISTORY:  Cerebritis/arteritis, headache


 


COMPARISON:  MRI brain same date


 


TECHNIQUE:  Axial and coronal 3D time-of-flight image acquisition was performed.

 3D rotational images were generated with source and reconstruction images 

reviewed. Horizontal and vertical axis rotational views generated using MIP 

protocol.


 


FINDINGS:  Exam suffers from motion degradation but is still considered to be 

diagnostic.


 


Small distal left vertebral artery is present. Basilar artery shows no focal 

stenosis or abnormal contour. The left posterior cerebral artery P1 segment is 

very small and slightly irregular. The patient does appear to have a large left 

posterior communicating artery which could explain this finding. Both posterior 

cerebral arteries show slightly irregular contour. No focal stenoses of the 

distal posterior cerebral artery distributions.


 


Cavernous portions of each internal carotid artery show slightly irregular 

contour. The right middle cerebral artery M2 and M3 branches show slight 

irregular contour changes. No focal stenosis or major branch occlusion on the 

right. Both anterior cerebral arteries show irregular contour of each A1 

segment. This is possibly due to the motion affects. Long segment significant 

narrowing of the left middle cerebral artery M2 and proximal M3 branches noted.


 


IMPRESSION:  Bilateral middle cerebral artery abnormalities of irregular contour

 and left middle cerebral artery significant long segment stenoses.


 


Patient has irregular narrowing of the bilateral anterior cerebral artery A1 

segments and left posterior cerebral P1 segments. This could be due to vascular 

disease or possibly the affects of motion for the anterior cerebral findings and

 anatomic variant for the left posterior cerebral finding.


 


Findings are consistent with vasculitis.





ROS:


General: Awake, alert, oriented, sitting upright in bed eating breakfast


CV: S1,S2


RESP: Good breath sounds


ABD: Nontender, bowel sounds present





Assessment and plan: 


ESRD, receiving HD


AMS resolved


Thrombocytopenia/Anemia of chronic disease/Renal failure


Protein calorie malnourished 


HIV antibody 4th generation test reactive, Antibody test and HIV RNA testing all

 negative


From ID standpoint, no further testing or treatment needs to be done


Will continue to monitor


Thank you for consult


Patient discussed with Dr. Kelly

## 2020-08-03 NOTE — PN
Date of Progress Note:  08/02/2020



Chief Complaint:  End-stage renal disease, on dialysis.



Subjective:  Patient received dialysis yesterday.  Patient was diagnosed with lupus cerebritis.  She 
is on Solu-Medrol with dialysis and she was evaluated by Neurology group. 



Patient is awaiting LP.  Patient is to have platelet transfusion before LP.  Dialysis will be schedul
ed tomorrow. 



Patient presented to the hospital with baseline mental status changes and she denies confusion.  She 
is feeling better.



Review of Systems:

Denies PND, orthopnea.



Physical Examination:

Lungs:  Diminished breath sounds at bases. 

Heart:  S1, S2. 

Abdomen:  Soft, benign. 

Extremities:  Minimal edema.



Laboratory Data:  Hemoglobin 9.2, platelet count 79,000.  Sodium 144, potassium 4.1, BUN is 60, creat
inine 5.46.



Impression And Plan:  

1.End-stage renal disease.  Patient will have next dialysis tomorrow.  Patient is still awaiting for
 further workup for lupus cerebritis.  Patient will continue CellCept, steroids, and Plaquenil.

2.Hypertension.  Blood pressure is in acceptable control.  Monitor blood pressure.  Advance ultrafil
tration according to blood pressure.

3.Anemia, multifactorial.  Hemoglobin is 9.2.  Continue ELMER.

4.Screen showed human immunodeficiency virus, was reactive.  Patient will be evaluated by Infectious
 Disease.





KEN/CRAIG

DD:  08/02/2020 19:31:42Voice ID:  [id number]

DT:  08/03/2020 01:03:34Report ID:  [id number]

## 2020-08-04 VITALS — DIASTOLIC BLOOD PRESSURE: 99 MMHG | SYSTOLIC BLOOD PRESSURE: 163 MMHG

## 2020-08-04 VITALS — TEMPERATURE: 97.7 F

## 2020-08-04 RX ADMIN — PANTOPRAZOLE SODIUM SCH MG: 40 TABLET, DELAYED RELEASE ORAL at 08:30

## 2020-08-04 RX ADMIN — METOPROLOL TARTRATE SCH MG: 25 TABLET ORAL at 05:44

## 2020-08-04 RX ADMIN — METHYLPREDNISOLONE SODIUM SUCCINATE SCH MLS: 1 INJECTION, POWDER, FOR SOLUTION INTRAMUSCULAR; INTRAVENOUS at 08:33

## 2020-08-04 RX ADMIN — Medication SCH: at 08:33

## 2020-08-04 RX ADMIN — ACETAMINOPHEN PRN MG: 500 TABLET, FILM COATED ORAL at 08:32

## 2020-08-04 RX ADMIN — Medication SCH ML: at 08:33

## 2020-08-04 RX ADMIN — CALCITRIOL CAPSULES 0.25 MCG SCH MCG: 0.25 CAPSULE ORAL at 05:44

## 2020-08-04 RX ADMIN — ASPIRIN SCH MG: 81 TABLET, COATED ORAL at 08:32

## 2020-08-04 RX ADMIN — Medication SCH MG: at 08:29

## 2020-08-04 RX ADMIN — HYDROXYCHLOROQUINE SULFATE SCH MG: 200 TABLET, FILM COATED ORAL at 08:30

## 2020-08-04 RX ADMIN — ANTACID TABLETS SCH MG: 500 TABLET, CHEWABLE ORAL at 08:30

## 2020-08-04 RX ADMIN — IRON SUPPLEMENT SCH MG: 325 TABLET ORAL at 08:31

## 2020-08-04 RX ADMIN — AMLODIPINE BESYLATE SCH MG: 10 TABLET ORAL at 08:31

## 2020-08-04 RX ADMIN — VENLAFAXINE SCH: 75 TABLET ORAL at 08:30

## 2020-08-04 NOTE — P.DS
Discharge Date: 08/04/20


Disposition: ROUTINE DISCHARGE


Discharge Condition: GOOD


Reason for Admission: ALTERED MENTAL STATUS


Consultations: 





Nephrologist 


Neurologist 


Infectious disease 





- Problems


(1) Lupus nephritis


Status: Acute   





(2) ESRD (end stage renal disease)


Status: Acute   





(3) Altered mental status


Status: Acute   





(4) Near syncope


Status: Acute   





(5) Lupus cerebritis


Status: Acute   





(6) Lupus vasculitis


Status: Acute   


Brief History of Present Illness: 





Patient is a 25-year-old female who was recently discharged from the hospital 

after being diagnosed with lupus nephritis.  Patient also has some abnormalities

with CT of the brain.  Patient had a repeat CT scan done tonight which showed 

progression of neurologic inflammation or ischemia.  The emergency room spoke 

with neurologist who recommended admission for further evaluation.  Fluoroscopic

LP will be done this morning.  MRI stroke protocol as well.  Otherwise, patient 

appears to be doing much better.  She was a little confused on arrival to the ER

but her symptoms have resolved.  She will be admitted to the hospital for 

further workup.


Hospital Course: 





Patient had imaging studies which revealed vasculitis and possibly cerebritis. 

Patient was given a pulse dose steroids. Lumbar puncture was also obtained. No 

significant amount of protein. Neurologic status has improved and back to 

baseline. At this time, patient is stable for discharge. Patient will continue 

tapering dose of steroids and follow up with rheumatology this week. She says 

she has an appointment with the local rheumatologist. Also follow with our 

neurologist in 2 weeks and continue hemodialysis with nephrology. At this time, 

patient is stable for discharge home with outpatient follow up. 


Vital Signs/Physical Exam: 














Temp Pulse Resp BP Pulse Ox


 


 97.7 F   87   18   163/99 H  100 


 


 08/04/20 08:00  08/04/20 08:31  08/04/20 08:00  08/04/20 08:31  08/04/20 08:00








General: Alert, In no apparent distress, Oriented x3


Laboratory Data at Discharge: 














WBC  9.9 K/uL (4.3-10.9)  D 08/03/20  07:57    


 


Hgb  10.6 g/dL (12.0-15.0)  L  08/03/20  07:57    


 


Hct  31.1 % (36.0-45.0)  L  08/03/20  07:57    


 


Plt Count  137 K/uL (152-406)  L D 08/03/20  07:57    


 


PT  11.5 SECONDS (9.5-12.5)   07/31/20  05:04    


 


INR  0.97   07/31/20  05:04    


 


APTT  28.4 SECONDS (24.3-36.9)   07/31/20  05:04    


 


Sodium  141 mmol/L (136-145)   08/03/20  05:12    


 


Sodium  Cancelled   08/03/20  05:12    


 


Potassium  4.0 mmol/L (3.5-5.1)   08/03/20  05:12    


 


Potassium  Cancelled   08/03/20  05:12    


 


BUN  56 mg/dL (7-18)  H  08/03/20  05:12    


 


BUN  Cancelled   08/03/20  05:12    


 


Creatinine  5.14 mg/dL (0.55-1.3)  H*  08/03/20  05:12    


 


Creatinine  Cancelled   08/03/20  05:12    


 


Glucose  128 mg/dL ()  H  08/03/20  05:12    


 


Glucose  Cancelled   08/03/20  05:12    


 


Phosphorus  3.2 mg/dL (2.5-4.9)   08/03/20  05:12    


 


Magnesium  1.9 mg/dL (1.8-2.4)   08/03/20  05:12    


 


Total Bilirubin  1.7 mg/dL (0.2-1.0)  H  07/31/20  05:04    


 


AST  13 U/L (15-37)  L  07/31/20  05:04    


 


ALT  < 6 U/L (12-78)  L  07/31/20  05:04    


 


Alkaline Phosphatase  61 U/L ()   07/31/20  05:04    


 


Triglycerides  88 mg/dL (<150)   07/31/20  05:04    


 


Cholesterol  113 mg/dL (<200)   07/31/20  05:04    


 


HDL Cholesterol  85 mg/dL (40-60)  H  07/31/20  05:04    


 


Cholesterol/HDL Ratio  1.33   07/31/20  05:04    








Home Medications: 








Calcitrol [Rocaltrol*] 0.5 mcg PO Q48H 30 Days #15 cap 07/29/20 


Ferrous Sulfate [Ferrous Sulfate*] 325 mg PO TID 30 Days #90 tab 07/29/20 


Hydroxychloroquine [Plaquenil*] 200 mg PO DAILY 30 Days #30 tab 07/29/20 


Levetiracetam [Keppra] 500 mg PO BID 30 Days #60 tablet 07/29/20 


Loratadine [Claritin*] 10 mg PO DAILY PRN 30 Days #30 tab 07/29/20 


Mycophenolate Mofetil [Cellcept] 500 mg PO BID 30 Days #60 07/29/20 


Nepro Shake [Nepro*] 237 ml PO BID 30 Days #60 can 07/29/20 


Pantoprazole Sodium [Protonix] 40 mg PO DAILY 30 Days #30 tablet. 07/29/20 


Venlafaxine HCl [Effexor*] 75 mg PO DAILY 30 Days #30 07/29/20 


predniSONE [Deltasone*] 40 mg PO DAILY 30 Days #30 tab 07/29/20 


Calcium Carbonate [Tums Regular*] 500 mg PO TID 07/31/20 


Aspirin [Aspirin EC 81 MG] 81 mg PO DAILY #30 tablet. 08/04/20 


Atorvastatin Calcium [Lipitor] 40 mg PO BEDTIME #30 tab 08/04/20 


predniSONE [Prednisone*] 20 mg PO TID #30 tab 08/04/20 





New Medications: 


Aspirin [Aspirin EC 81 MG] 81 mg PO DAILY #30 tablet.


Atorvastatin Calcium [Lipitor] 40 mg PO BEDTIME #30 tab


predniSONE [Prednisone*] 20 mg PO TID #30 tab


Patient Discharge Instructions: OK TO DC IV AND DC HOME.  FOLLOW-UP WITH PRIMARY

 CARE PROVIDER IN 1-2 WEEKS.  FOLLOW-UP WITH NEPHROLOGY FOR HD.  FOLLOW-UP WITH 

Neurology IN 1-2 WEEKS.  FOLLOW-UP WITH RHEUMATOLOGY IN 1-2 WEEKS.  RETURN TO 

THE ER IF SYMPTOMS WORSENS.  CALL DR. MORIN [phone] IF ANY QUESTIONS 

REGARDING HOSPITAL STAY.  PLEASE CALL THE FLOOR -[phone] IF ANY 

MEDICATION OR NURSING QUESTIONS.


Diet: Renal


Activity: Fall precautions


Followup: 


Melissa Longoria MD [ACTIVE - CAN ADMIT] - 


Julio Garnica MD [ASSOCIATE-ACTIVE - CAN ADMIT] - 


Time spent managing pt's care (in minutes): 25

## 2020-08-04 NOTE — P.PN
Date of Service: 08/04/20


Subjective: Patient is a 25 year old female who was recently discharged from the

hospital after being diagnosed with Lupus nephritis. Patient has had lupus since

she was 15 years old. Patient presented with some confusion, MRI showed 

vasculitis and cerebritis. Patient to have lumbar puncture today. Patients HIV 

4th generation test was reactive, which I was consulted for. 





Patient examined at bedside. Denies nausea, diarrhea, dysuria. Reports dialysis 

is going well and has been ambulatory. 





Objective:














Temp Pulse Resp BP Pulse Ox


 


 97.7 F   87   18   163/99 H  100 


 


 08/04/20 08:00  08/04/20 08:31  08/04/20 08:00  08/04/20 08:31  08/04/20 08:00








Labs: Na 141, K 4.0, BUN 56, Creat 5.14, Albumin 2.9, WBC 9.9, Hgb 10.6, Hct 

31.1, Plt 137





Brain MRI 7/31: 


EXAM DESCRIPTION:  MRI - MRA Head Wo Cont - 7/31/2020 12:48 pm


 


CLINICAL HISTORY:  Cerebritis/arteritis, headache


 


COMPARISON:  MRI brain same date


 


TECHNIQUE:  Axial and coronal 3D time-of-flight image acquisition was performed.

3D rotational images were generated with source and reconstruction images 

reviewed. Horizontal and vertical axis rotational views generated using MIP 

protocol.


 


FINDINGS:  Exam suffers from motion degradation but is still considered to be 

diagnostic.


 


Small distal left vertebral artery is present. Basilar artery shows no focal 

stenosis or abnormal contour. The left posterior cerebral artery P1 segment is 

very small and slightly irregular. The patient does appear to have a large left 

posterior communicating artery which could explain this finding. Both posterior 

cerebral arteries show slightly irregular contour. No focal stenoses of the 

distal posterior cerebral artery distributions.


 


Cavernous portions of each internal carotid artery show slightly irregular 

contour. The right middle cerebral artery M2 and M3 branches show slight 

irregular contour changes. No focal stenosis or major branch occlusion on the 

right. Both anterior cerebral arteries show irregular contour of each A1 

segment. This is possibly due to the motion affects. Long segment significant 

narrowing of the left middle cerebral artery M2 and proximal M3 branches noted.


 


IMPRESSION:  Bilateral middle cerebral artery abnormalities of irregular contour

and left middle cerebral artery significant long segment stenoses.


 


Patient has irregular narrowing of the bilateral anterior cerebral artery A1 

segments and left posterior cerebral P1 segments. This could be due to vascular 

disease or possibly the affects of motion for the anterior cerebral findings and

anatomic variant for the left posterior cerebral finding.


 


Findings are consistent with vasculitis.





ROS:


General: Awake, alert, oriented, sitting upright in bed 


CV: S1,S2


RESP: Good breath sounds


ABD: Nontender, bowel sounds present





Assessment and plan: 


ESRD, receiving HD


AMS resolved


Thrombocytopenia/Anemia of chronic disease/Renal failure


Protein calorie malnourished 


HIV antibody 4th generation test reactive, Antibody test and HIV RNA testing all

negative, From ID standpoint, no further testing or treatment needs to be done


Lupus nephritis, continue current treatment 


Will continue to monitor


Patient discussed with Dr. Kelly

## 2020-08-04 NOTE — PN
Date of Progress Note:  08/03/2020



Chief Complaint:  End-stage renal disease, on dialysis.



Subjective:  The patient has longstanding history of lupus.  She is admitted to the hospital because 
of deterioration of mental status and she is undergoing LP today.  She received platelet transfusion 
and dialysis was done today.  The patient is on IV Solu-Medrol, CellCept, and Plaquenil.  The patient
 is started on dialysis for end-stage renal disease.



Review of Systems:

The patient is feeling better.  Denies headache or vision changes.



Physical Examination:

Lungs:  Diminished at bases. 

Heart:  S1, S2. 

Abdomen:  Soft, benign. 

Extremities:  Minimal edema.



Laboratory Work:  Hemoglobin 10.6, WBC 9.9, platelet count 137,000.  Chemistry shows sodium 141, pota
ssium 4, chloride 109, CO2 27, BUN 36, creatinine 5.43, calcium 8.7, glucose 128.



Impression And Plan:  

1.End-stage renal disease.  Dialysis was done today.  Ultrafiltration was obtained to control volemi
a.

2.Hypertension.  Continue blood pressure medication.

3.Lupus cerebritis.  Continue treatment.  The patient was seen by Neurology.

4.The patient will continue ELMER for anemia due to chronic kidney.





EB/MODL

DD:  08/03/2020 21:55:12Voice ID:  [id number]

DT:  08/04/2020 01:43:47Report ID:  [id number]

## 2020-08-05 NOTE — PN
Date of Progress Note:  08/04/2020



Chief Complaint:  End-stage renal disease, on dialysis.



History Of Present Illness:  The patient has history of long-term lupus, lupus nephritis.  She underw
ent kidney biopsy as a child and was found to have lupus nephritis.  Currently, the patient is on IV 
Solu-Medrol for lupus cerebritis and she is on CellCept and Plaquenil.  The patient was initiated on 
dialysis for end-stage renal disease, is tolerating dialysis.



Review of Systems:

Denies PND or orthopnea.



Physical Examination:

Lungs:  Diminished breath sounds at bases. 

Heart:  S1 and S2. 

Abdomen:  Soft, benign. 

Extremities:  Minimal edema.



Laboratory Data:  Sodium 141, potassium 4.0, creatinine 5.43, BUN 36, and CO2 of 27.



Impression And Plan:  

1.End-stage renal disease.  Dialysis was done yesterday.  Continue dialysis on Monday, Wednesday, an
d Friday.

2.Hypertension.  Continue blood pressure medication.  Blood pressure is in acceptable control.

3.Lupus cerebritis, on treatment as per Neurology and primary team.

4.Anemia due to chronic kidney disease, continue ELMER.





EB/MODL

DD:  08/04/2020 21:57:22Voice ID:  [id number]

DT:  08/05/2020 03:31:45Report ID:  [id number]

## 2020-09-04 ENCOUNTER — HOSPITAL ENCOUNTER (INPATIENT)
Dept: HOSPITAL 97 - ER | Age: 25
LOS: 2 days | Discharge: HOME | DRG: 811 | End: 2020-09-06
Attending: INTERNAL MEDICINE | Admitting: INTERNAL MEDICINE
Payer: SELF-PAY

## 2020-09-04 VITALS — BODY MASS INDEX: 26.3 KG/M2

## 2020-09-04 DIAGNOSIS — F41.8: ICD-10-CM

## 2020-09-04 DIAGNOSIS — Z88.0: ICD-10-CM

## 2020-09-04 DIAGNOSIS — D63.8: Primary | ICD-10-CM

## 2020-09-04 DIAGNOSIS — Z79.52: ICD-10-CM

## 2020-09-04 DIAGNOSIS — Z79.899: ICD-10-CM

## 2020-09-04 DIAGNOSIS — Z79.82: ICD-10-CM

## 2020-09-04 DIAGNOSIS — Z99.2: ICD-10-CM

## 2020-09-04 DIAGNOSIS — N18.6: ICD-10-CM

## 2020-09-04 DIAGNOSIS — I12.0: ICD-10-CM

## 2020-09-04 DIAGNOSIS — M32.14: ICD-10-CM

## 2020-09-04 LAB
ALBUMIN SERPL BCP-MCNC: 3 G/DL (ref 3.4–5)
ALP SERPL-CCNC: 70 U/L (ref 45–117)
ALT SERPL W P-5'-P-CCNC: 14 U/L (ref 12–78)
AST SERPL W P-5'-P-CCNC: 15 U/L (ref 15–37)
BUN BLD-MCNC: 10 MG/DL (ref 7–18)
GLUCOSE SERPLBLD-MCNC: 77 MG/DL (ref 74–106)
HCT VFR BLD CALC: 16.9 % (ref 36–45)
LYMPHOCYTES # SPEC AUTO: 1.3 K/UL (ref 0.7–4.9)
MAGNESIUM SERPL-MCNC: 1.7 MG/DL (ref 1.8–2.4)
NT-PROBNP SERPL-MCNC: 1509 PG/ML (ref ?–125)
PMV BLD: 8.4 FL (ref 7.6–11.3)
POTASSIUM SERPL-SCNC: 3.1 MMOL/L (ref 3.5–5.1)
RBC # BLD: 1.74 M/UL (ref 3.86–4.86)
TROPONIN I: < 0.02 NG/ML (ref 0–0.04)

## 2020-09-04 PROCEDURE — 80048 BASIC METABOLIC PNL TOTAL CA: CPT

## 2020-09-04 PROCEDURE — 36430 TRANSFUSION BLD/BLD COMPNT: CPT

## 2020-09-04 PROCEDURE — 36415 COLL VENOUS BLD VENIPUNCTURE: CPT

## 2020-09-04 PROCEDURE — 86900 BLOOD TYPING SEROLOGIC ABO: CPT

## 2020-09-04 PROCEDURE — 85018 HEMOGLOBIN: CPT

## 2020-09-04 PROCEDURE — 30233N1 TRANSFUSION OF NONAUTOLOGOUS RED BLOOD CELLS INTO PERIPHERAL VEIN, PERCUTANEOUS APPROACH: ICD-10-PCS

## 2020-09-04 PROCEDURE — 84484 ASSAY OF TROPONIN QUANT: CPT

## 2020-09-04 PROCEDURE — 85014 HEMATOCRIT: CPT

## 2020-09-04 PROCEDURE — 86901 BLOOD TYPING SEROLOGIC RH(D): CPT

## 2020-09-04 PROCEDURE — 84466 ASSAY OF TRANSFERRIN: CPT

## 2020-09-04 PROCEDURE — 86850 RBC ANTIBODY SCREEN: CPT

## 2020-09-04 PROCEDURE — 85025 COMPLETE CBC W/AUTO DIFF WBC: CPT

## 2020-09-04 PROCEDURE — 83880 ASSAY OF NATRIURETIC PEPTIDE: CPT

## 2020-09-04 PROCEDURE — 82728 ASSAY OF FERRITIN: CPT

## 2020-09-04 PROCEDURE — 83735 ASSAY OF MAGNESIUM: CPT

## 2020-09-04 PROCEDURE — 93005 ELECTROCARDIOGRAM TRACING: CPT

## 2020-09-04 PROCEDURE — 86870 RBC ANTIBODY IDENTIFICATION: CPT

## 2020-09-04 PROCEDURE — 83540 ASSAY OF IRON: CPT

## 2020-09-04 PROCEDURE — 71045 X-RAY EXAM CHEST 1 VIEW: CPT

## 2020-09-04 PROCEDURE — 80076 HEPATIC FUNCTION PANEL: CPT

## 2020-09-04 PROCEDURE — 86922 COMPATIBILITY TEST ANTIGLOB: CPT

## 2020-09-04 RX ADMIN — HEPARIN SODIUM SCH UNIT: 5000 INJECTION, SOLUTION INTRAVENOUS; SUBCUTANEOUS at 23:04

## 2020-09-04 RX ADMIN — Medication SCH ML: at 23:04

## 2020-09-04 RX ADMIN — ATORVASTATIN CALCIUM SCH: 40 TABLET, FILM COATED ORAL at 22:15

## 2020-09-04 NOTE — XMS REPORT
Clinical Summary

                          Created on:2020



Patient:Jayde Mcduffie

Sex:Female

:1995

External Reference #:[id number]





Demographics







                          Address                   [address] [place] [address]



                                                    [address] [place] [id number]

 

                          Home Phone                [phone]

 

                          Mobile Phone              [phone]

 

                          Home Phone                [phone]

 

                          Email Address             [email or web address]

 

                          Preferred Language        English

 

                          Marital Status            Single

 

                          Christianity Affiliation     Unknown

 

                          Race                      Black or 

 

                          Ethnic Group              Not  or 









Author







                          Organization              [facility]

 

                          Address                   [address] [place] [address]









Support







                Name            Relationship    Address         Phone

 

                Patt Peng    Unavailable     [address] [place] [address] [phone]



                                                [address] [place] [id number] 









Care Team Providers







                    Name                Role                Phone

 

                    Asked, No Pcp       Primary Care Provider Unavailable









Allergies







             Active Allergy Reactions    Severity     Noted Date   Comments

 

             Penicillins  Shortness Of Breath High         2018   







Medications

No known medications



Active Problems

Not on file



Social History







             Tobacco Use  Types        Packs/Day    Years Used   Date

 

             Never Smoker                                        









                Smokeless Tobacco: Never Used                                 









                Alcohol Use     Drinks/Week     oz/Week         Comments

 

                Yes                                             Occasionally









                          Sex Assigned at Birth     Date Recorded

 

                          Not on file               









                    Job Start Date      Occupation          Industry

 

                    Not on file         Not on file         Not on file









                    Travel History      Travel Start        Travel End









                                        No recent travel history available.







Last Filed Vital Signs

Not on file



Plan of Treatment







                Health Maintenance Due Date        Last Done       Comments

 

                CERVICAL CANCER SCREENING 2015                      

 

                INFLUENZA VACCINE 10/01/2020                      







Results

Not on fileafter 2019



Insurance







          Payer     Benefit Plan / Subscriber ID Effective Dates Phone     Addre

ss   Type



                    Group                                             

 

          [employer]      [employer] xxxxxxxxxxxx 2016-Present           [address]



                                        



                                                            [place] [address] 









           Guarantor Name Account Type Relation to Date of Birth Phone      Bill

ing



                                 Patient                          Address

 

           Jayde Mcduffie Personal/Family Self       1995 [phone] [address] [place] [address]



                                                       ([place])     [address] [place] [address]







                                                                  [address] [place] [address]



                                                                  [id number]







Advance Directives

For more information, please contact: [phone]





                Type            Date Recorded   Patient Representative Explanati

on

 

                Advance Directives, 2016 11:51 PM                 



                Living Will and                                 



                Medical Power of                                 



                                                        

 

                Advance Directives, 11/10/2016  1:17 AM                 



                Living Will and                                 



                Medical Power of

## 2020-09-04 NOTE — XMS REPORT
Continuity of Care Document

                          Created on:2020



Patient:NELLIE JOLLY

Sex:Female

:1995

External Reference #:[id number]





Demographics







                          Address                   [address] [address]



                                                    [place] [address]

 

                          Home Phone                [phone]

 

                          Work Phone                [phone]

 

                          Mobile Phone              [phone]

 

                          Email Address             [email or web address]

 

                          Preferred Language        English

 

                          Marital Status            Unknown

 

                          Restoration Affiliation     Unknown

 

                          Race                      Unknown

 

                          Additional Race(s)        White



                                                    Unavailable



                                                    Unavailable



                                                    Black or 

 

                          Ethnic Group              Not  or 









Author







                          Organization              [facility]

t

 

                          Address                   [address]



                                                    [place] [address]

 

                          Phone                     [phone]









Support







                Name            Relationship    Address         Phone

 

                Yeny Tay  Life Partner    [address] [phone]





                                                [place] [address] 

 

                [employer] le[employer]   Other           [address] [phone]



                                                         



                                                [place] [address] 

 

                [employer]   Aunt            [address]   [phone]



                                                [place] [address] 









Care Team Providers







                    Name                Role                Phone

 

                    Asked, No Pcp       Primary Care Physician Unavailable

 

                    David NP,  G       Attending Clinician [phone]

 

                    Doctor Unassigned,  Name Attending Clinician Unavailable









Problems







       Condition Condition Condition Status Onset  Resolution Last   Treating Co

mments 

Source



       Name   Details Category        Date   Date   Treatment Clinician        



                                                 Date                 

 

       Thyroid Thyroid Disease Active 2017                             [facility]



       lesion lesion               0                               Health



                                   00:00:                             



                                   00                                 

 

       Lymphadeno Lymphadeno Disease Active                              H

bharat starks,                                              Health



       cervical cervical               00:00:                             



                                   00                                 

 

       Anemia Anemia Disease Active                              [facility]



                                                                  Health



                                   00:00:                             



                                   00                                 

 

       Systemic Systemic Disease Active                              Cris lorenzo



       lupus  lupus                                               Health



       erythemato erythemato               00:00:                             



       blanche with blanche with               00                                 



       glomerular glomerular                                                  



       disease disease                                                  

 

       Cold   Cold   Disease Active                              [facility]



       agglutinin agglutinin                                              He

alth



       disease disease               00:00:                             



                                   00                                 

 

       Menorrhagi Menorrhagi Disease Active                                    H

myriamis



       a with a with                                                  Health



       regular regular                                                  



       cycle  cycle                                                   

 

       Uterine Uterine Disease Active                                    [facility]



       leiomyoma leiomyoma                                                  [facility]al

th

 

       Acquired Acquired Disease Active                                    Cris lorenzo



       hemolytic hemolytic                                                  [facility]

th



       anemia anemia                                                  

 

       Current Current Disease Active                                    [facility]



       use of use of                                                  Health



       steroid steroid                                                  



       medication medication                                                  

 

       High risk High risk Disease Active                                    [facility]

ris



       medication medication                                                  He

alth



       use    use                                                     

 

       Lupus  Lupus  Disease Active                                    [place]



       nephritis nephritis                                                  [facility]

th

 

       Proteinuri Proteinuri Disease Active                                    H

[facility]



       a      a                                                       Health







Allergies, Adverse Reactions, Alerts







       Allergy Allergy Status Severity Reaction(s) Onset  Inactive Treating Comm

ents 

Source



       Name   Type                        Date   Date   Clinician        

 

       Penicill Propensi Active        Shortness Of                       

[place]



       ins    ty to                Breath                         Methodi



              adverse                      00:00:                      st



              reaction                      00                          



              s to                                                    



              drug                                                    

 

       Amoxicil Propensi Active                                     [place]



       hansa-Pot ty to                                               Health



       Clavulan adverse                      00:00:                      



       ate    reaction                      00                          



              s to                                                    



              drug                                                    

 

       Penicill Propensi Active                                     [place]



       ins    ty to                                               Health



              adverse                      00:00:                      



              reaction                      00                          



              s to                                                    



              drug                                                    







Family History







           Family Member Diagnosis  Comments   Start Date Stop Date  Source

 

           Natural father Heart failure                                  [facility]

 

           Maternal grandfather Diabetes                                    [facility]

 

           Maternal grandfather Heart failure                                  H

[facility]

 

           Maternal grandmother Stomach cancer                                  

[facility]

 

           Natural mother Cerebral aneurysm                                  [facility]

 

           Natural mother Heart failure                                  [facility]

 

           Natural mother Hyperthyroidism                                  [facility]

s [facility]







Social History







           Social Habit Start Date Stop Date  Quantity   Comments   Source

 

           Sex Assigned At                                             [facility]

[facility]



           Birth                                                  

 

           Alcohol Comment 2018 Occasionally            [place]



                      00:00:00   00:00:00                         [employer]

 

           Alcohol intake 2017-10-01 2017-10-01 Current non-drinker            H

[facility]



                      00:00:00   00:00:00   of alcohol            



                                            (finding)             









                Smoking Status  Start Date      Stop Date       Source

 

                Never smoker                                    [facility]







Medications







       Ordered Filled Start  Stop   Current Ordering Indication Dosage Frequency

 Signature

                    Comments            Components          Source



     Medication Medication Date Date Medication? Clinician                (SIG) 

          



     Name Name                                                   

 

     hydroxychlo      2017      Yes            200mg QD   Take 200           H

[facility]



     roquine      0-08                               mg by           [facility]



     (PLAQUENIL)      18:25:                               mouth           



     200 mg      57                                 daily.           



     tablet                                                        

 

     ferrous      2017      Yes            325mg QD   Take 325           [facility]

s



     sulfate 325      0-08                               mg by           [facility]



     mg (65 mg      18:25:                               mouth           



     iron)      57                                 daily           



     tablet                                         (with           



                                                  breakfast)           



                                                  .              

 

     aspirin      2017      Yes            81mg QD   Chew and           [facility]



     (ASPIRIN)      0-08                               swallow 81           [facility]al

th



     81 mg      18:25:                               mg by           



     chewable      57                                 mouth           



     tablet                                         daily.           

 

     sertraline      2017      Yes            50mg QD   Take 50 mg           H

arrketan



     (ZOLOFT) 50      0-08                               by mouth           [facility]al

th



     mg tablet      18:25:                               daily.           



               57                                                

 

     atovaquone      2017      Yes            1500mg QD   Take 10 mL          

 [facility]



     (MEPRON)      0-08                               by mouth           Health



     750 mg/5 mL      00:00:                               daily.           



     oral      00                                                



     suspension                                                        

 

     folic acid      2017      Yes            1mg  QD   Take 1           [facility]

s



     (FOLVITE) 1      0-08                               tablet by           He

lth



     mg tablet      00:00:                               mouth           



               00                                 daily.           

 

     sennosides-      2017      Yes            1{tbl} QD   Take 1           [facility]

rris



     docusate      0-08                               tablet by           Health



     sodium      00:00:                               mouth           



     (SENNA      00                                 daily.           



     PLUS)                                                        



     8.6-50 mg                                                        



     tablet                                                        

 

     hydroxychlo      2017      Yes            200mg QD   Take 1           [facility]

ris



     roquine      0-08                               tablet by           [facility]



     (PLAQUENIL)      00:00:                               mouth           



     200 mg      00                                 daily.           



     tablet                                                        

 

     omeprazole      2017      Yes            40mg QD   Take 2           [facility]

s



     (PRILOSEC)      0-08                               capsules           Healt

h



     20 mg      00:00:                               by mouth           



     delayed      00                                 daily.           



     release                                                        



     capsule                                                        

 

     mycophenola      2017      Yes       Systemic 1500mg Q.5D Take 3         

  [facility]



     te        0-08                lupus           tablets by           [facility]



     (CELLCEPT)      00:00:                erythematos           mouth 2        

   



     500 mg      00                  us with           times           



     tablet                          glomerular           daily.           



                                   disease,                          



                                   unspecified                          



                                   SLE type                          

 

     predniSONE      2017      Yes            60mg QD   Take 3           [facility]

s



     (DELTASONE)      0-08                               tablets by           He

alth



     20 mg      00:00:                               mouth           



     tablet      00                                 daily Take           



                                                  60mg (3           



                                                  tablets)           



                                                  daily for           



                                                  4              



                                                  weeksTake           



                                                  50mg (2.5           



                                                  tablets)           



                                                  daily for           



                                                  2              



                                                  weeksThen           



                                                  take 40mg           



                                                  (2             



                                                  tablets)           



                                                  daily           



                                                  until seen           



                                                  in clinic.           

 

     ondansetron      2017      Yes            4mg       Take 1           [facility]

is



     (ZOFRAN) 4      0-08                               tablet by           Heal

th



     mg tablet      00:00:                               mouth           



               00                                 every 8           



                                                  hours as           



                                                  needed for           



                                                  up to 5           



                                                  doses for           



                                                  Nausea.           







Procedures

This patient has no known procedures.



Plan of Care







             Planned Activity Planned Date Details      Comments     Source

 

             Future Scheduled 2020-10-01   INFLUENZA VACCINE              [facility] [employer]



             Test         00:00:00     [code = INFLUENZA              



                                       VACCINE]                  

 

             Future Scheduled 2020-10-01   [facility]



             Test         00:00:00     Seasonal Oct to              



                                       March (>/= 19 yrs)              



                                       [code = IMM               



                                       Influenza Seasonal              



                                       Oct to March (>/= 19              



                                       yrs)]                     

 

             Future Scheduled 2015   Screening for              [facility]

thodist



             Test         00:00:00     malignant neoplasm              



                                       of cervix                 



                                       (procedure) [code =              



                                       642342401]                

 

             Future Scheduled 2015   Screening for              [facility] Finn

lt



             Test         00:00:00     malignant neoplasm              



                                       of cervix                 



                                       (procedure) [code =              



                                       532572158]                







Encounters







        Start   End     Encounter Admission Attending Care    Care    Encounter 

Source



        Date/Time Date/Time Type    Type    Clinicians Facility Department ID   

   

 

        2020 Emergency         [facility]    [id number]

[id number] 



        17:16:57 20:21:00                 Caryl Cox [id number]         



                                                [place] [id number]         



                                                [place]  [phone]         



                                                        [phone]             

 

        2020 Orders          Doctor  CARMELA    [id number]

40 



        00:00:00 00:00:00 Only            Unassigned, MALIKA   [id number]       

  



                                        [place] [id number]         



                                                        [phone]         



                                                        009             

 

        2017-11-10 2017-11-10 Outpatient                 [facility]     [id number]

72 [place]



        00:00:00 00:00:00                                                 Health

 

        2017-10-03 2017-10-03 Outpatient                 [facility]     [id number]

78 [place]



        07:37:31 07:37:31                                                 Health

 

        2017-10-01 2017-10-01 Outpatient                 [facility]     [id number]

67 [place]



        07:21:26 07:21:26                                                 Health

 

        2017 Outpatient                 [facility]     [id number]

80 [place]



        15:25:08 15:25:08                                                 Health

 

        2017 Outpatient                 [facility]     [id number]

51 [place]



        10:38:27 10:38:27                                                 Health

 

        2017 Emergency                 [facility]     [id number]

2 [place]



        04:19:42 04:19:42                                                 Health

 

        2017 [facility]patient                 [facility]     [id number]

0 [place]



        00:52:05 00:52:05                                                 [facility]

 

        2017 Emergency                 [facility]     MED     [id number]

4 [place]



        18:41:35 18:41:35                                                 Health







Results

This patient has no known results.

## 2020-09-04 NOTE — XMS REPORT
Clinical Summary

                          Created on:2020



Patient:Jayde Mcduffie

Sex:Female

:1995

External Reference #:[id number]





Demographics







                          Address                   [address] 



                                                    [place] [address]

 

                          Home Phone                [phone]

 

                          Work Phone                [phone]

 

                          Mobile Phone              [phone]

 

                          Preferred Language        ENG

 

                          Marital Status            Single

 

                          Yazidi Affiliation     Unknown

 

                          Race                      Unknown

 

                          Ethnic Group              Unknown









Author







                          Organization              [facility]

ict

 

                          Address                   [facility] [place] [address]









Support







                Name            Relationship    Address         Phone

 

                Patt Peng    Unavailable     [address] [facility] [address]  [phone]



                                                [place] [address] 









Care Team Providers







                    Name                Role                Phone

 

                    Unavailable         Primary Care Provider Unavailable









Allergies







             Active Allergy Reactions    Severity     Noted Date   Comments

 

             Amoxicillin-Pot Clavulanate                           2009   

 

             Penicillins                            2009   







Medications







          Medication Sig       Dispensed Refills   Start Date End Date  Status

 

          hydroxychloroquine Take 200 mg by           0                         

    Active



          (PLAQUENIL) 200 mg tablet mouth daily.                                

         

 

          ferrous sulfate 325 mg Take 325 mg by           0                     

        Active



          (65 mg iron) tablet mouth daily                                       

  



                    (with                                             



                    breakfast).                                         

 

          aspirin (ASPIRIN) 81 mg Chew and            0                         

    Active



          chewable tablet swallow 81 mg                                         



                    by mouth                                          



                    daily.                                            

 

          sertraline (ZOLOFT) 50 mg Take 50 mg by           0                   

          Active



          tablet    mouth daily.                                         

 

          atovaquone (MEPRON) 750 Take 10 mL by 210 mL    0         10/08/2017  

         Active



          mg/5 mL oral suspension mouth daily.                                  

       

 

          folic acid (FOLVITE) 1 mg Take 1 tablet 90 tablet 3         10/08/2017

           Active



          tablet    by mouth                                          



                    daily.                                            

 

          sennosides-docusate Take 1 tablet 30 tablet 3         10/08/2017      

     Active



          sodium (SENNA PLUS) by mouth                                          



          8.6-50 mg tablet daily.                                            

 

          hydroxychloroquine Take 1 tablet 30 tablet 3         10/08/2017       

    Active



          (PLAQUENIL) 200 mg tablet by mouth                                    

      



                    daily.                                            

 

          omeprazole (PRILOSEC) 20 Take 2    60 capsule 3         10/08/2017    

       Active



          mg delayed release capsules by                                        

 



          capsule   mouth daily.                                         

 

          mycophenolate (CELLCEPT) Take 3 tablets 90 tablet 3         10/08/2017

           Active



          500 mg tabletIndications: by mouth 2                                  

       



          Acquired hemolytic times daily.                                       

  



          anemia, Systemic lupus                                                

   



          erythematosus with                                                   



          glomerular disease,                                                   



          unspecified SLE type                                                  

 

 

                          predniSONE (DELTASONE) 20 Take 3 tablets by mouth alma

y Take 60mg (3 tablets) 

daily for 4 weeks 90 tablet    2            10/08/2017                Active



          mg tablet Take 50mg (2.5 tablets) daily for 2 weeks                   

                      



                    Then take 40mg (2 tablets) daily until seen in clinic.      

                                   

 

          ondansetron (ZOFRAN) 4 mg Take 1 tablet 20 tablet 0         10/08/2017

           Active



          tablet    by mouth every                                         



                    8 hours as                                         



                    needed for up                                         



                    to 5 doses for                                         



                    Nausea.                                           







Active Problems







                          Problem                   Noted Date

 

                          Thyroid lesion            10/01/2017

 

                          Lymphadenopathy, cervical 2017

 

                          Anemia                    2017

 

                          Systemic lupus erythematosus with glomerular disease 0

2017

 

                          Cold agglutinin disease   2017

 

                          Menorrhagia with regular cycle 

 

                          Uterine leiomyoma         

 

                          Acquired hemolytic anemia 

 

                          Current use of steroid medication 

 

                          High risk medication use  

 

                          Lupus nephritis           

 

                          Proteinuria               







Family History







                Medical History Relation        Name            Comments

 

                Heart failure   Father                          

 

                Diabetes        Maternal Grandfather                 

 

                Heart failure   Maternal Grandfather                 

 

                Stomach cancer  Maternal Grandmother                 

 

                Cerebral aneurysm Mother                          

 

                Heart failure   Mother                          

 

                Hyperthyroidism Mother                          









                Relation        Name            Status          Comments

 

                Father                                          

 

                Maternal Grandfather                                 

 

                Maternal Grandmother                                 

 

                Mother                                          







Social History







             Tobacco Use  Types        Packs/Day    Years Used   Date

 

             Never Smoker                                        









                Smokeless Tobacco: Never Used                                 









                Alcohol Use     Drinks/Week     oz/Week         Comments

 

                No                                              









                          Sex Assigned at Birth     Date Recorded

 

                          Not on file               









                    Job Start Date      Occupation          Industry

 

                    Not on file         Not on file         Not on file









                    Travel History      Travel Start        Travel End









                                        No recent travel history available.







Last Filed Vital Signs

Not on file



Plan of Treatment







                Health Maintenance Due Date        Last Done       Comments

 

                Cervical Cancer Scrn (3 Yrs) 2015                      

 

                IMM Influenza Seasonal Oct to March (>/= 19 yrs) 10/01/2020     

                 







Results

Not on fileafter 2019



Insurance







          Payer     Benefit Plan / Subscriber ID Effective Dates Phone     Addre

ss   Type



                    Group                                             

 

          [facility]      SELF-PAY  xxxxxxxxx 2017-Prese [phone] [id number] 



           SELF-PAY   UNSCREENED            [address]          [place], [place] 



                                                            [id number]     









           Guarantor Name Account Type Relation to Date of    Phone      Billing



                                 Patient    Birth                 Address

 

           Jayde Mcduffie Personal/Family Self       1995 [phone] [address]









             [address]                                             (Home)



                                        [place], [place]



                                                       [phone]



                                                       (Work)     







Advance Directives







                Code Status     Date Activated  Date Inactivated Comments

 

                Full Code       2017 12:28 PM 10/8/2017  8:31 PM

## 2020-09-04 NOTE — P.HP
Certification for Inpatient


Patient admitted to: Observation


With expected LOS: <2 Midnights


Patient will require the following post-hospital care: None


Practitioner: I am a practitioner with admitting privileges, knowledge of 

patient current condition, hospital course, and medical plan of care.


Services: Services provided to patient in accordance with Admission requirements

found in Title 42 Section 412.3 of the Code of Federal Regulations





Patient History


Date of Service: 09/04/20


Reason for admission: Anemia


History of Present Illness: 


25-year-old  female with history of lupus nephritis, end-stage 

renal disease on chronic hemodialysis, hypertension, seizure disorder  presents 

emergency department for chief complaint of abnormal lab results.  Patient 

reports that she had labs drawn on 09/02/2020 and was calm with a critical 

result of a hemoglobin of 5.5 today.  Patient reported to the emergency 

department for further evaluation and management.  When patient was worked up in

the emergency department she was found to have a hemoglobin of 5.6.  Patient is 

Monday Wednesday Friday dialysis patient and did complete dialysis today.  

Transfusion was ordered for 2 units packed red blood cells in the emergency 

department and plan was to transfuse patient and have her discharge but patient 

with antibodies and would require blood from blood bank in [place].  Due to the 

prolonged time to obtain blood products and multiple units patient be admitted 

under observation for further management.





When I saw the patient in the emergency department she was awake, alert, 

oriented x4.  Patient denies any other complaints at this time but does report 

that she feels a little short of breath and weak.





Allergies





Penicillins Allergy (Verified 07/31/20 04:41)


   Hives/Rash





Home Medications: 








Calcitrol [Rocaltrol*] 0.5 mcg PO Q48H 30 Days #15 cap 07/29/20 


Ferrous Sulfate [Ferrous Sulfate*] 325 mg PO TID 30 Days #90 tab 07/29/20 


Hydroxychloroquine [Plaquenil*] 200 mg PO DAILY 30 Days #30 tab 07/29/20 


Levetiracetam [Keppra] 500 mg PO BID 30 Days #60 tablet 07/29/20 


Loratadine [Claritin*] 10 mg PO DAILY PRN 30 Days #30 tab 07/29/20 


Mycophenolate Mofetil [Cellcept] 500 mg PO BID 30 Days #60 07/29/20 


Nepro Shake [Nepro*] 237 ml PO BID 30 Days #60 can 07/29/20 


Pantoprazole Sodium [Protonix] 40 mg PO DAILY 30 Days #30 tablet. 07/29/20 


Venlafaxine HCl [Effexor*] 75 mg PO DAILY 30 Days #30 07/29/20 


predniSONE [Deltasone*] 40 mg PO DAILY 30 Days #30 tab 07/29/20 


Calcium Carbonate [Tums Regular*] 500 mg PO TID 07/31/20 


Aspirin [Aspirin EC 81 MG] 81 mg PO DAILY #30 tablet. 08/04/20 


Atorvastatin Calcium [Lipitor] 40 mg PO BEDTIME #30 tab 08/04/20 


predniSONE [Prednisone*] 20 mg PO TID #30 tab 08/04/20 








- Past Medical/Surgical History


Diabetic: No


-: Lupus


-: Acute renal failure/ ESRD/dialysis pt


-: Congestive heart failure status post pericardial window in 2008


-: Anemia of chronic disease


-: Hypertension


-: Depression with anxiety


-: Right kidney biopsy


-: Pericardial window


-: Tessio placement


Psychosocial/ Personal History: .  Lives at home with .  No 

children





- Family History


  ** Mother


-: Heart disease, Kidney disease





  ** Aunt


-: Diabetes





  ** Grandmother


-: Diabetes





- Social History


Smoking Status: Never smoker


Alcohol use: No


CD- Drugs: No


Caffeine use: No


Place of Residence: Home





Review of Systems


General: Weakness


Respiratory: Shortness of Breath





Physical Examination





- Physical Exam


General: Alert, In no apparent distress


HEENT: Atraumatic, PERRLA, Mucous membr. moist/pink, EOMI, Sclerae nonicteric


Neck: Supple, 2+ carotid pulse no bruit, No LAD, Without JVD or thyroid 

abnormality


Respiratory: Clear to auscultation bilaterally, Normal air movement


Cardiovascular: Regular rate/rhythm, Normal S1 S2


Gastrointestinal: Normal bowel sounds, No tenderness


Musculoskeletal: No tenderness


Integumentary: No rashes


Neurological: Normal gait, Normal speech, Normal strength at 5/5 x4 extr, Normal

tone, Normal affect


Urinary: Dialysis catheter





- Studies


Laboratory Data (last 24 hrs)





09/04/20 18:35: WBC 3.1 L, Hgb 5.5 L*, Hct 16.9 L*, Plt Count 172


09/04/20 18:35: Sodium 139, Potassium 3.1 L, BUN 10, Creatinine 2.05 H, Glucose 

77, Magnesium 1.7 L, Total Bilirubin 0.8, AST 15, ALT 14, Alkaline Phosphatase 

70, Troponin I < 0.02








Assessment and Plan





- Plan


Assessment


Anemia of chronic disease


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


Seizure disorder


Hypertension


Depression with anxiety





Plan


Anemia of chronic disease:  Patient be transfused 2 units packed red blood 

cells, will administer Lasix after each unit as patient does still produces 

urine.  Will repeat H&H approximately 2 hr after 2nd unit of blood has been 

transfused.  Anticipate discharge tomorrow morning after completed blood 

transfusion.  DVT prophylaxis heparin 5000 units subcutaneous twice daily.


End-stage renal disease on hemodialysis secondary to lupus nephritis:  Obtain 

and continue patient's home medications.  Patient completed dialysis today.  At 

discharge patient will continue with dialysis on Monday unless there is 

indication for more urgent dialysis.


Seizure disorder:  Continue Keppra


Hypertension:  Continue metoprolol


Depression with anxiety:  Continue citalopram


Discharge Plan: Home


Plan to discharge in: 24 Hours





- Advance Directives


Does patient have a Living Will: No


Does patient have a Durable POA for Healthcare: No





- Code Status/Comfort Care


Code Status Assessed: Yes (Patient is full code)


Critical Care: No


Time Spent Managing Pts Care (In Minutes): 55

## 2020-09-04 NOTE — RAD REPORT
EXAM DESCRIPTION:  Feli Single View9/4/2020 7:03 pm

 

CLINICAL HISTORY:  Lupus/anemia

 

COMPARISON:  July 2020

 

FINDINGS:   The lungs appear clear of acute infiltrate. The heart is normal size. Central venous cath
eter has its tip in the superior vena cava

 

IMPRESSION:   No acute abnormalities displayed

## 2020-09-05 LAB
BUN BLD-MCNC: 14 MG/DL (ref 7–18)
FERRITIN SERPL-MCNC: 1443.8 NG/ML (ref 8–388)
GLUCOSE SERPLBLD-MCNC: 74 MG/DL (ref 74–106)
HCT VFR BLD CALC: 24.7 % (ref 36–45)
IRON SERPL-MCNC: 90 UG/DL (ref 50–170)
POTASSIUM SERPL-SCNC: 3.7 MMOL/L (ref 3.5–5.1)
TRANSFERRIN SERPL-MCNC: 157 MG/DL (ref 200–360)

## 2020-09-05 RX ADMIN — ATORVASTATIN CALCIUM SCH MG: 40 TABLET, FILM COATED ORAL at 20:09

## 2020-09-05 RX ADMIN — CITALOPRAM HYDROBROMIDE SCH: 10 TABLET ORAL at 20:10

## 2020-09-05 RX ADMIN — METOPROLOL TARTRATE SCH: 25 TABLET ORAL at 06:00

## 2020-09-05 RX ADMIN — Medication SCH ML: at 08:41

## 2020-09-05 RX ADMIN — HEPARIN SODIUM SCH UNIT: 5000 INJECTION, SOLUTION INTRAVENOUS; SUBCUTANEOUS at 20:09

## 2020-09-05 RX ADMIN — HEPARIN SODIUM SCH UNIT: 5000 INJECTION, SOLUTION INTRAVENOUS; SUBCUTANEOUS at 08:40

## 2020-09-05 RX ADMIN — METOPROLOL TARTRATE SCH: 25 TABLET ORAL at 18:05

## 2020-09-05 RX ADMIN — Medication SCH ML: at 20:09

## 2020-09-05 NOTE — P.PN
Subjective


Date of Service: 09/05/20


Chief Complaint: Anemia


Subjective: No new changes (-still awaiting PRBC availability -Feels fine, 

denies any complaint)





Physical Examination





- Vital Signs


Temperature: 99.6 F


Blood Pressure: 103/56


Pulse: 90


Respirations: 18


Pulse Ox (%): 99





- Physical Exam


General: Alert, In no apparent distress, Oriented x3


HEENT: Atraumatic, Normocephalic, PERRLA


Neck: 2+ carotid pulse no bruit, JVD not distended


Respiratory: Clear to auscultation bilaterally, Normal air movement


Cardiovascular: Normal pulses, Regular rate/rhythm, Normal S1 S2


Gastrointestinal: Normal bowel sounds, Soft and benign, Non-distended


Musculoskeletal: No clubbing, No swelling


Neurological: Normal speech, Normal strength at 5/5 x4 extr, Normal tone





- Studies


Laboratory Data (last 24 hrs)





09/04/20 18:35: WBC 3.1 L, Hgb 5.5 L*, Hct 16.9 L*, Plt Count 172


09/04/20 18:35: Sodium 139, Potassium 3.1 L, BUN 10, Creatinine 2.05 H, Glucose 

77, Magnesium 1.7 L, Total Bilirubin 0.8, AST 15, ALT 14, Alkaline Phosphatase 

70, Troponin I < 0.02








Assessment & Plan





- Problems (Diagnosis)


(1) HTN (hypertension)


Current Visit: Yes   Status: Acute   





(2) Anemia


Current Visit: Yes   Status: Acute   





(3) ESRD (end stage renal disease)


Current Visit: No   Status: Acute   





(4) Lupus nephritis


Current Visit: No   Status: Acute   


Discharge Plan: Home


Plan to discharge in: 24 Hours


Physician Review: Patient Assessed, Agree with Above Assessment and Plan


Physician Review Additional Text: 





Anemia of chronic disease


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


Seizure disorder


Hypertension


Depression with anxiety





Plan





Anemia of chronic disease:  Transfuse 2 units PRBC today when blood available


Volume status controlled


Will discuss with Nephrology to adjust erythropoietin dosage


Follow repeat iron profile and dose IV iron if needed








End-stage renal disease on hemodialysis secondary to lupus nephritis:  Low 

potassium on admission post dialysis


-repeat potassium level again today


Continue HD MWF


No urgency to dialysis today


Seizure disorder:  Continue Keppra


Hypertension:  Continue metoprolol


Depression with anxiety:  Continue citalopram


Discharge Plan: Home


Plan to discharge in: 24 Hours

## 2020-09-06 VITALS — OXYGEN SATURATION: 97 %

## 2020-09-06 VITALS — SYSTOLIC BLOOD PRESSURE: 111 MMHG | TEMPERATURE: 98.3 F | DIASTOLIC BLOOD PRESSURE: 63 MMHG

## 2020-09-06 LAB
BUN BLD-MCNC: 24 MG/DL (ref 7–18)
GLUCOSE SERPLBLD-MCNC: 79 MG/DL (ref 74–106)
HCT VFR BLD CALC: 23.7 % (ref 36–45)
LYMPHOCYTES # SPEC AUTO: 0.8 K/UL (ref 0.7–4.9)
PMV BLD: 8.1 FL (ref 7.6–11.3)
POTASSIUM SERPL-SCNC: 4.1 MMOL/L (ref 3.5–5.1)
RBC # BLD: 2.55 M/UL (ref 3.86–4.86)

## 2020-09-06 RX ADMIN — Medication SCH ML: at 08:09

## 2020-09-06 RX ADMIN — CITALOPRAM HYDROBROMIDE SCH: 10 TABLET ORAL at 08:09

## 2020-09-06 RX ADMIN — HEPARIN SODIUM SCH UNIT: 5000 INJECTION, SOLUTION INTRAVENOUS; SUBCUTANEOUS at 08:09

## 2020-09-06 RX ADMIN — METOPROLOL TARTRATE SCH: 25 TABLET ORAL at 05:37

## 2020-09-06 NOTE — P.DS
Admission Date: 09/04/20


Discharge Date: 09/06/20


Disposition: ROUTINE DISCHARGE


Discharge Condition: FAIR


Reason for Admission: Anemia





- Problems


(1) HTN (hypertension)


Current Visit: Yes   Status: Acute   





(2) Anemia


Current Visit: Yes   Status: Acute   





(3) ESRD (end stage renal disease)


Current Visit: No   Status: Acute   





(4) Lupus nephritis


Current Visit: No   Status: Acute   


Brief History of Present Illness: 





Patient admitted for anemia.  She has history of ESRD on HD MWF.  She was 

admitted due to inability to get her  blood transfusion while in the emergency 

room due to presence of high autoantibodies.  She admitted to weakness pre 

dialysis


Hospital Course: 





On admission patient was started on gentle IV fluid.  After the latest she was 

able to receive 2 units of packed red cells with no immediate reaction noted. 

Her hemoglobin subsequently improved from 5.5 on admission to 8.0 this morning. 

Her vital signs remained stable.  She is saturating well on will be discharged 

home today.  She will resume dialysis in a.m. after outpatient dialysis unit


Vital Signs/Physical Exam: 














Temp Pulse Resp BP Pulse Ox


 


 98.3 F   84   16   111/63   97 


 


 09/06/20 08:00  09/06/20 08:00  09/06/20 08:00  09/06/20 08:00  09/06/20 08:00








General: Alert, In no apparent distress, Oriented x3


HEENT: Atraumatic, Normocephalic, PERRLA


Neck: Supple, 2+ carotid pulse no bruit, JVD not distended


Respiratory: Clear to auscultation bilaterally, Normal air movement


Cardiovascular: No edema, Normal pulses, Regular rate/rhythm, Normal S1 S2


Gastrointestinal: Normal bowel sounds, Soft and benign, Non-distended


Musculoskeletal: No clubbing, No swelling


Neurological: Normal speech, Normal strength at 5/5 x4 extr, Normal tone


Laboratory Data at Discharge: 














WBC  2.2 K/uL (4.3-10.9)  L D 09/06/20  05:31    


 


Hgb  8.0 g/dL (12.0-15.0)  L  09/06/20  05:31    


 


Hct  23.7 % (36.0-45.0)  L  09/06/20  05:31    


 


Plt Count  132 K/uL (152-406)  L D 09/06/20  05:31    


 


Sodium  142 mmol/L (136-145)   09/06/20  05:31    


 


Potassium  4.1 mmol/L (3.5-5.1)   09/06/20  05:31    


 


BUN  24 mg/dL (7-18)  H  09/06/20  05:31    


 


Creatinine  4.40 mg/dL (0.55-1.3)  H  09/06/20  05:31    


 


Glucose  79 mg/dL ()   09/06/20  05:31    


 


Magnesium  1.7 mg/dL (1.8-2.4)  L  09/04/20  18:35    


 


Total Bilirubin  0.8 mg/dL (0.2-1.0)   09/04/20  18:35    


 


AST  15 U/L (15-37)   09/04/20  18:35    


 


ALT  14 U/L (12-78)   09/04/20  18:35    


 


Alkaline Phosphatase  70 U/L ()   09/04/20  18:35    


 


Troponin I  < 0.02 ng/mL (0.0-0.045)   09/04/20  18:35    








Home Medications: 








Aspirin [Ruth Ann Chewable Aspirin] 81 mg PO DAILY 09/04/20 


Atorvastatin Calcium [Lipitor*] 40 mg PO BEDTIME 09/04/20 


Citalopram [Celexa*] 10 mg PO BEDTIME 09/04/20 


Ferrous Sulfate [Ferrous Sulfate*] 325 mg PO TID 09/04/20 


Hydroxychloroquine [Plaquenil*] 200 mg PO DAILY 09/04/20 


Metoprolol Tartrate [Lopressor*] 25 mg PO BID 09/04/20 


Mycophenolate Mofetil [Cellcept] 500 mg PO BID 09/04/20 


Pantoprazole [Protonix Tab*] 40 mg PO DAILY 09/04/20 


levETIRAcetam [Keppra*] 500 mg PO BID 09/04/20 


predniSONE [Deltasone*] 10 mg PO DAILY 09/04/20 





Patient Discharge Instructions: Continue dialysis as scheduled


Diet: Renal


Activity: Ad pallavi


Time spent managing pt's care (in minutes): 35

## 2020-09-08 NOTE — EKG
Test Date:    2020-09-04               Test Time:    19:56:18

Technician:   ZULEIKA                                    

                                                     

MEASUREMENT RESULTS:                                       

Intervals:                                           

Rate:         91                                     

NJ:           148                                    

QRSD:         78                                     

QT:           388                                    

QTc:          477                                    

Axis:                                                

P:            43                                     

NJ:           148                                    

QRS:          47                                     

T:            51                                     

                                                     

INTERPRETIVE STATEMENTS:                                       

                                                     

Normal sinus rhythm

Normal ECG

Compared to ECG 07/20/2020 12:06:42

No significant changes



Electronically Signed On 09-08-20 19:59:52 CDT by Brice Nuñez

## 2020-09-18 ENCOUNTER — HOSPITAL ENCOUNTER (INPATIENT)
Dept: HOSPITAL 97 - ER | Age: 25
LOS: 1 days | Discharge: HOME | DRG: 682 | End: 2020-09-19
Payer: SELF-PAY

## 2020-09-18 VITALS — OXYGEN SATURATION: 100 %

## 2020-09-18 VITALS — BODY MASS INDEX: 25.4 KG/M2

## 2020-09-18 DIAGNOSIS — Z20.828: ICD-10-CM

## 2020-09-18 DIAGNOSIS — Z95.828: ICD-10-CM

## 2020-09-18 DIAGNOSIS — E66.9: ICD-10-CM

## 2020-09-18 DIAGNOSIS — Z88.0: ICD-10-CM

## 2020-09-18 DIAGNOSIS — M32.14: ICD-10-CM

## 2020-09-18 DIAGNOSIS — Z79.52: ICD-10-CM

## 2020-09-18 DIAGNOSIS — I12.0: Primary | ICD-10-CM

## 2020-09-18 DIAGNOSIS — N18.6: ICD-10-CM

## 2020-09-18 DIAGNOSIS — F41.8: ICD-10-CM

## 2020-09-18 DIAGNOSIS — Z79.899: ICD-10-CM

## 2020-09-18 DIAGNOSIS — Z79.82: ICD-10-CM

## 2020-09-18 DIAGNOSIS — Z99.2: ICD-10-CM

## 2020-09-18 DIAGNOSIS — D63.1: ICD-10-CM

## 2020-09-18 LAB
ANISOCYTOSIS BLD QL: (no result)
BLD SMEAR INTERP: (no result)
BUN BLD-MCNC: 28 MG/DL (ref 7–18)
GLUCOSE SERPLBLD-MCNC: 85 MG/DL (ref 74–106)
HCT VFR BLD CALC: 20.3 % (ref 36–45)
INR BLD: 0.91
LYMPHOCYTES # SPEC AUTO: 0.7 K/UL (ref 0.7–4.9)
MORPHOLOGY BLD-IMP: (no result)
PMV BLD: 8.6 FL (ref 7.6–11.3)
POLYCHROMASIA BLD QL SMEAR: (no result)
POTASSIUM SERPL-SCNC: 3.4 MMOL/L (ref 3.5–5.1)
RBC # BLD: 2.15 M/UL (ref 3.86–4.86)

## 2020-09-18 PROCEDURE — 86850 RBC ANTIBODY SCREEN: CPT

## 2020-09-18 PROCEDURE — 86870 RBC ANTIBODY IDENTIFICATION: CPT

## 2020-09-18 PROCEDURE — 86900 BLOOD TYPING SEROLOGIC ABO: CPT

## 2020-09-18 PROCEDURE — 85730 THROMBOPLASTIN TIME PARTIAL: CPT

## 2020-09-18 PROCEDURE — 85025 COMPLETE CBC W/AUTO DIFF WBC: CPT

## 2020-09-18 PROCEDURE — 86922 COMPATIBILITY TEST ANTIGLOB: CPT

## 2020-09-18 PROCEDURE — 36415 COLL VENOUS BLD VENIPUNCTURE: CPT

## 2020-09-18 PROCEDURE — 86901 BLOOD TYPING SEROLOGIC RH(D): CPT

## 2020-09-18 PROCEDURE — 80048 BASIC METABOLIC PNL TOTAL CA: CPT

## 2020-09-18 PROCEDURE — 85610 PROTHROMBIN TIME: CPT

## 2020-09-18 PROCEDURE — 99285 EMERGENCY DEPT VISIT HI MDM: CPT

## 2020-09-18 PROCEDURE — 90935 HEMODIALYSIS ONE EVALUATION: CPT

## 2020-09-18 NOTE — EDPHYS
Physician Documentation                                                                           

 [facility]                                                                 

Name: Jayde Mcduffie                                                                               

Age: 25 yrs                                                                                       

Sex: Female                                                                                       

: 1995                                                                                   

MRN: [id number]                                                                                   

Arrival Date: 2020                                                                          

Time: 11:27                                                                                       

Account#: [id number]                                                                            

Bed 14                                                                                            

Private MD:                                                                                       

ED Physician Dennis Orantes                                                                       

HPI:                                                                                              

                                                                                             

13:46 This 25 yrs old Black Female presents to ER via Ambulatory with complaints of Abnormal  kb  

      Lab Results.                                                                                

13:46 Pt states she was told to come to the ER because she needed a blood transfusion. States kb  

      she started dialysis a month ago and has had to get a transfusion once already. Reports     

      she had a heavy, 12 day cycle this month that just ended. Onset: The symptoms/episode       

      began/occurred today. Severity of symptoms: At their worst the symptoms were moderate       

      in the emergency department the symptoms are unchanged. The patient has not experienced     

      similar symptoms in the past. The patient has not recently seen a physician.                

                                                                                                  

OB/GYN:                                                                                           

11:45 LMP 2020                                                                              aa5 

                                                                                                  

Historical:                                                                                       

- Allergies:                                                                                      

11:30 PENICILLINS;                                                                            sv  

- PMHx:                                                                                           

11:30 Anemia; Dialysis; Lupus;                                                                sv  

- PSHx:                                                                                           

11:45 Dialysis catheter to chest;                                                             aa5 

                                                                                                  

- Immunization history:: Adult Immunizations up to date.                                          

- Social history:: Smoking status: Patient denies any tobacco usage or history of.                

                                                                                                  

                                                                                                  

ROS:                                                                                              

13:45 Constitutional: Negative for fever, chills, and weight loss, Cardiovascular: Negative   kb  

      for chest pain, palpitations, and edema, Respiratory: Negative for shortness of breath,     

      cough, wheezing, and pleuritic chest pain, Abdomen/GI: Negative for abdominal pain,         

      nausea, vomiting, diarrhea, and constipation, Back: Negative for injury and pain,           

      MS/Extremity: Negative for injury and deformity, Skin: Negative for injury, rash, and       

      discoloration, Neuro: Negative for headache, weakness, numbness, tingling, and seizure.     

                                                                                                  

Exam:                                                                                             

13:45 Constitutional:  This is a well developed, well nourished patient who is awake, alert,  kb  

      and in no acute distress. Head/Face:  Normocephalic, atraumatic. Chest/axilla:  Normal      

      chest wall appearance and motion.  Nontender with no deformity.  No lesions are             

      appreciated. Cardiovascular:  Regular rate and rhythm with a normal S1 and S2.  No          

      gallops, murmurs, or rubs.  Normal PMI, no JVD.  No pulse deficits. Respiratory:  Lungs     

      have equal breath sounds bilaterally, clear to auscultation and percussion.  No rales,      

      rhonchi or wheezes noted.  No increased work of breathing, no retractions or nasal          

      flaring. Abdomen/GI:  Soft, non-tender, with normal bowel sounds.  No distension or         

      tympany.  No guarding or rebound.  No evidence of tenderness throughout. Skin:  Warm,       

      dry with normal turgor.  Normal color with no rashes, no lesions, and no evidence of        

      cellulitis. MS/ Extremity:  Pulses equal, no cyanosis.  Neurovascular intact.  Full,        

      normal range of motion. Neuro:  Awake and alert, GCS 15, oriented to person, place,         

      time, and situation.  Cranial nerves II-XII grossly intact.  Motor strength 5/5 in all      

      extremities.  Sensory grossly intact.  Cerebellar exam normal.  Normal gait.                

                                                                                                  

Vital Signs:                                                                                      

11:31  / 70; Pulse 110; Resp 16; Temp 98.5; Pulse Ox 100% ; Weight 57.15 kg; Height 4   sv  

      ft. 11 in. (149.86 cm);                                                                     

13:00  / 60; Pulse 89; Resp 18 S; Pulse Ox 100% on R/A; Pain 0/10;                      aa5 

14:45  / 72; Pulse 85; Resp 16 S; Temp 98.0(TE); Pulse Ox 100% on R/A; Pain 0/10;       aa5 

11:31 Body Mass Index 25.45 (57.15 kg, 149.86 cm)                                             sv  

                                                                                                  

MDM:                                                                                              

11:36 Patient medically screened.                                                             kb  

12:47 Data reviewed: vital signs, nurses notes. Data interpreted: Pulse oximetry: on room air kb  

      is 100 %. Interpretation: normal. Counseling: I had a detailed discussion with the          

      patient and/or guardian regarding: the historical points, exam findings, and any            

      diagnostic results supporting the discharge/admit diagnosis, lab results, the need for      

      further work-up and treatment in the hospital. Physician consultation: Dr Kamala leblanc        

      for consult.                                                                                

                                                                                                  

                                                                                             

11:59 Order name: Type And Screen                                                             ca1 

                                                                                             

11:59 Order name: CBC with Diff; Complete Time: 13:45                                         ca1 

                                                                                             

11:59 Order name: Basic Metabolic Panel; Complete Time: 12:40                                 ca1 

                                                                                             

12:00 Order name: PT-INR; Complete Time: 12:40                                                ca1 

                                                                                             

12:00 Order name: Ptt, Activated; Complete Time: 12:40                                        ca1 

                                                                                             

12:00 Order name: IV; Complete Time: 12:00                                                    ca1 

                                                                                             

12:51 Order name: Packed RBC Leukored                                                         EDMS

                                                                                             

13:05 Order name: Antibody Identification                                                     EDMS

                                                                                             

13:33 Order name: CONS Physician Consult                                                      EDMS

                                                                                             

13:37 Order name: CBC Smear Scan; Complete Time: 13:45                                        EDMS

                                                                                                  

Administered Medications:                                                                         

No medications were administered                                                                  

                                                                                                  

                                                                                                  

Disposition:                                                                                      

16:43 Co-signature as Attending Physician, Dennis Orantes MD I agree with the assessment and   kdr 

      plan of care.                                                                               

                                                                                                  

Disposition:                                                                                      

20 12:48 Hospitalization ordered by Lucas Paris for Observation. Preliminary             

  diagnosis is Anemia, unspecified.                                                               

- Bed requested for Telemetry/MedSurg (observation).                                              

- Status is Observation.                                                                      aa5 

- Condition is Stable.                                                                            

- Problem is new.                                                                                 

- Symptoms are unchanged.                                                                         

                                                                                                  

                                                                                                  

                                                                                                  

Signatures:                                                                                       

Dispatcher [employer]                           EDMS                                                 

Bethany Pritchett, GREETL-C                 FNP-CkRekha Darden, RN                    RN                                                      

Dennis Orantes MD MD   kdr                                                  

Anum Booker RN                     RN   aa5                                                  

Lorenza Niño                              3                                                  

Niki Balderas, RN                        RN   ca1                                                  

                                                                                                  

Corrections: (The following items were deleted from the chart)                                    

12:53 12:46 PACKED RBC LEUKORED AS-[id number].BRZ ordered. [facility]                                 EDMS

12:53 12:46 ABO/RH typing ordered. [facility]                                                       EDMS

12:53 12:46 Antibody Screen ordered. [facility]                                                     EDMS

13:44 12:48 Hospitalization Ordered by Lucas Paris MD for Observation. Preliminary          dh3 

      diagnosis is Anemia, unspecified. Bed requested for Telemetry/MedSurg (observation).        

      Status is Observation. Condition is Stable. Problem is new. Symptoms are unchanged. kb      

15:21 13:44 2020 12:48 Hospitalization Ordered by Lucas Paris MD for Observation.     aa5 

      Preliminary diagnosis is Anemia, unspecified. Bed requested for Telemetry/MedSurg           

      (observation). Status is Observation. Condition is Stable. Problem is new. Symptoms are     

      unchanged. dh3                                                                              

                                                                                                  

**************************************************************************************************

## 2020-09-18 NOTE — ER
Nurse's Notes                                                                                     

 [facility]                                                                 

Name: Jayde Mcduffie                                                                               

Age: 25 yrs                                                                                       

Sex: Female                                                                                       

: 1995                                                                                   

MRN: [id number]                                                                                   

Arrival Date: 2020                                                                          

Time: 11:27                                                                                       

Account#: [id number]                                                                            

Bed 14                                                                                            

Private MD:                                                                                       

Diagnosis: Anemia, unspecified                                                                    

                                                                                                  

Presentation:                                                                                     

                                                                                             

11:29 Chief complaint: Patient states: sent by Dr Wray for low hemoglobin again. Had a blood  sv  

      transfusion 2 weeks ago. Due to have HD today. Coronavirus screen: Client denies travel     

      out of the [place] in the last 14 days. At this time, the client does not indicate any         

      symptoms associated with coronavirus-19. Ebola Screen: No symptoms or risks identified      

      at this time. Risk Assessment: Do you want to hurt yourself or someone else? Patient        

      reports no desire to harm self or others. Onset of symptoms was 2020.         

11:29 Method Of Arrival: Ambulatory                                                           sv  

11:29 Acuity: ADRIAN 3                                                                           sv  

11:31 Initial Sepsis Screen: Does the patient meet any 2 criteria? HR > 90 bpm. No. Patient's sv  

      initial sepsis screen is negative. Does the patient have a suspected source of              

      infection? No. Patient's initial sepsis screen is negative.                                 

                                                                                                  

OB/GYN:                                                                                           

11:45 LMP 2020                                                                              aa5 

                                                                                                  

Historical:                                                                                       

- Allergies:                                                                                      

11:30 PENICILLINS;                                                                            sv  

- PMHx:                                                                                           

11:30 Anemia; Dialysis; Lupus;                                                                sv  

- PSHx:                                                                                           

11:45 Dialysis catheter to chest;                                                             aa5 

                                                                                                  

- Immunization history:: Adult Immunizations up to date.                                          

- Social history:: Smoking status: Patient denies any tobacco usage or history of.                

                                                                                                  

                                                                                                  

Screenin:45 Abuse screen: Denies threats or abuse. Nutritional screening: No deficits noted.        aa5 

      Tuberculosis screening: No symptoms or risk factors identified. Fall Risk None              

      identified.                                                                                 

                                                                                                  

Assessment:                                                                                       

11:45 General: Appears comfortable, Behavior is calm, cooperative, Reports fatigue. Pain:     aa5 

      Denies pain. Neuro: Level of Consciousness is awake, alert, obeys commands, Oriented to     

      person, place, time, situation. Cardiovascular: Heart tones S1 S2 present Dialysis          

      catheter to chest, pt reports she just started dialysis approximately 1 month ago. .        

      Rhythm is regular. Respiratory: Airway is patent Respiratory effort is even, unlabored,     

      Respiratory pattern is regular, symmetrical, Denies cough, shortness of breath. GI:         

      Abdomen is flat, non-distended, Bowel sounds present X 4 quads. Abd is soft and non         

      tender X 4 quads. Patient currently denies bloody stool, denies vomiting blood. : No      

      signs and/or symptoms were reported regarding the genitourinary system. EENT: No signs      

      and/or symptoms were reported regarding the EENT system. Derm: Skin is dry, Skin is         

      normal, Skin temperature is warm. Musculoskeletal: Range of motion: intact in all           

      extremities.                                                                                

13:00 Reassessment: Pt sitting in bed using her cellphone. Pt notified of wait time for room  aa5 

      assignment. .                                                                               

14:00 Reassessment: Unsuccessful attempt to call report to admitting nurse. .                 aa5 

15:00 Neuro: Level of Consciousness is awake, alert, obeys commands, Oriented to person,      aa5 

      place, time, situation. Respiratory: Airway is patent Respiratory effort is even,           

      unlabored, Respiratory pattern is regular, symmetrical. Derm: Skin is dry, Skin is          

      normal, Skin temperature is warm.                                                           

                                                                                                  

Vital Signs:                                                                                      

11:31  / 70; Pulse 110; Resp 16; Temp 98.5; Pulse Ox 100% ; Weight 57.15 kg; Height 4   sv  

      ft. 11 in. (149.86 cm);                                                                     

13:00  / 60; Pulse 89; Resp 18 S; Pulse Ox 100% on R/A; Pain 0/10;                      aa5 

14:45  / 72; Pulse 85; Resp 16 S; Temp 98.0(TE); Pulse Ox 100% on R/A; Pain 0/10;       aa5 

11:31 Body Mass Index 25.45 (57.15 kg, 149.86 cm)                                             sv  

                                                                                                  

ED Course:                                                                                        

11:27 Patient arrived in ED.                                                                  mr  

11:29 Arm band placed on.                                                                     sv  

11:30 Triage completed.                                                                       sv  

11:34 [facility]                                                        kb  

11:34 Dennis Orantes MD is Attending Physician.                                              kb  

11:38 Anum Booker, RN is Primary Nurse.                                                   aa5 

11:45 Patient has correct armband on for positive identification. Bed in low position. Call   aa5 

      light in reach. Side rails up X 1. Pulse ox on. NIBP on.                                    

11:57 Initial lab(s) drawn, by me, sent to lab. Inserted saline lock: 20 gauge in right       aa5 

      antecubital area, using aseptic technique. Blood collected. VO for labs and IV received     

      at 1145.                                                                                    

12:48 Lucas Paris MD is Hospitalizing Provider.                                           kb  

15:15 No provider procedures requiring assistance completed. Patient admitted, IV remains in  aa5 

      place.                                                                                      

                                                                                                  

Administered Medications:                                                                         

No medications were administered                                                                  

                                                                                                  

                                                                                                  

Outcome:                                                                                          

12:48 Decision to Hospitalize by Provider.                                                    kb  

15:15 Patient left the ED.                                                                    aa5 

15:15 Admitted to Tele accompanied by tech, via wheelchair, with chart, Report called to      melissa Clayton RN                                                                                     

15:15 Condition: stable                                                                           

15:15 Instructed on the need for admit, Demonstrated understanding of instructions.               

                                                                                                  

Signatures:                                                                                       

Bethany Pritchett, CARLOTTAP-C                 FNP-Rekha Ochoa RN                    RN   Ratna Moseley Audri, TREVOR                     RN   whit                                                  

                                                                                                  

Corrections: (The following items were deleted from the chart)                                    

11:33 11:29 Chief complaint: Patient states: sent by Dr Wray for low hemoglobin again. Had a  sv  

      blood transfusion 2 weeks ago. sv                                                           

15:22 15:21 Patient left the ED. aa5                                                          aa5 

15:24 11:45 Cardiovascular: Heart tones S1 S2 present Rhythm is regular aa5                   aa5 

15:24 11:45 GI: Abdomen is flat, non-distended, Bowel sounds present X 4 quads. Abd is soft   aa5 

      and non tender X 4 quads. aa5                                                               

                                                                                                  

**************************************************************************************************

## 2020-09-18 NOTE — XMS REPORT
Clinical Summary

                          Created on:2020



Patient:Jayde Mcduffie

Sex:Female

:1995

External Reference #:[id number]





Demographics







                          Address                   [address] [place] [address]



                                                    [address] [place] [address]

 

                          Home Phone                [phone]

 

                          Mobile Phone              [phone]

 

                          Home Phone                [phone]

 

                          Email Address             [email or web address]

 

                          Preferred Language        English

 

                          Marital Status            Single

 

                          [employer] Affiliation     Unknown

 

                          Race                      Black or 

 

                          Ethnic Group              Not  or 









Author







                          Organization              [facility] [employer]

 

                          Address                   [address] [place] [address]









Support







                Name            Relationship    Address         Phone

 

                Patt Peng    Unavailable     [address] [place] [address] [phone]



                                                [address] [place] [id number] 









Care Team Providers







                    Name                Role                Phone

 

                    Asked, No Pcp       Primary Care Provider Unavailable









Allergies







             Active Allergy Reactions    Severity     Noted Date   Comments

 

             Penicillins  Shortness Of Breath High         2018   







Medications

No known medications



Active Problems

Not on file



Social History







             Tobacco Use  Types        Packs/Day    Years Used   Date

 

             Never Smoker                                        









                Smokeless Tobacco: Never Used                                 









                Alcohol Use     Drinks/Week     oz/Week         Comments

 

                Yes                                             Occasionally









                          Sex Assigned at Birth     Date Recorded

 

                          Not on file               









                    Job Start Date      Occupation          Industry

 

                    Not on file         Not on file         Not on file









                    Travel History      Travel Start        Travel End









                                        No recent travel history available.







Last Filed Vital Signs

Not on file



Plan of Treatment







                Health Maintenance Due Date        Last Done       Comments

 

                CERVICAL CANCER SCREENING 2015                      

 

                INFLUENZA VACCINE 2020                      







Results

Not on fileafter 2019



Insurance







          Payer     Benefit Plan / Subscriber ID Effective Dates Phone     Addre

ss   Type



                    Group                                             

 

          [employer]      [employer] xxxxxxxxxxxx 2016-Present           [address]



                                        



                                                            [place] [address] 









           Guarantor Name Account Type Relation to Date of Birth Phone      Bill

ing



                                 Patient                          Address

 

           Jayde Mcduffie Personal/Family Self       1995 [phone] [address] [place] [address]



                                                       ([place])     [address] [place] [address]







                                                                  [address] [place] [address]



                                                                  [id number]







Advance Directives

For more information, please contact: [phone]





                Type            Date Recorded   Patient Representative Explanati

on

 

                Advance Directives, 2016 11:51 PM                 



                Living Will and                                 



                Medical Power of                                 



                                                        

 

                Advance Directives, 11/10/2016  1:17 AM                 



                Living Will and                                 



                Medical Power of

## 2020-09-18 NOTE — XMS REPORT
Clinical Summary

                          Created on:2020



Patient:Jayde Mcduffie

Sex:Female

:1995

External Reference #:[id number]





Demographics







                          Address                   [address] 



                                                    [place] [address]

 

                          Home Phone                [phone]

 

                          Work Phone                [phone]

 

                          Mobile Phone              [phone]

 

                          Preferred Language        ENG

 

                          Marital Status            Single

 

                          B[place]ai Affiliation     Unknown

 

                          Race                      Unknown

 

                          Ethnic Group              Unknown









Author







                          Organization              [facility]

ict

 

                          Address                   [facility] [place] [address]









Support







                Name            Relationship    Address         Phone

 

                Patt Peng    Unavailable     [address] [facility] [address]  [phone]



                                                [place] [address] 









Care Team Providers







                    Name                Role                Phone

 

                    Unavailable         Primary Care Provider Unavailable









Allergies







             Active Allergy Reactions    Severity     Noted Date   Comments

 

             Amoxicillin-Pot Clavulanate                           2009   

 

             Penicillins                            2009   







Medications







          Medication Sig       Dispensed Refills   Start Date End Date  Status

 

          hydroxychloroquine Take 200 mg by           0                         

    Active



          (PLAQUENIL) 200 mg tablet mouth daily.                                

         

 

          ferrous sulfate 325 mg Take 325 mg by           0                     

        Active



          (65 mg iron) tablet mouth daily                                       

  



                    (with                                             



                    breakfast).                                         

 

          aspirin (ASPIRIN) 81 mg Chew and            0                         

    Active



          chewable tablet swallow 81 mg                                         



                    by mouth                                          



                    daily.                                            

 

          sertraline (ZOLOFT) 50 mg Take 50 mg by           0                   

          Active



          tablet    mouth daily.                                         

 

          atovaquone (MEPRON) 750 Take 10 mL by 210 mL    0         10/08/2017  

         Active



          mg/5 mL oral suspension mouth daily.                                  

       

 

          folic acid (FOLVITE) 1 mg Take 1 tablet 90 tablet 3         10/08/2017

           Active



          tablet    by mouth                                          



                    daily.                                            

 

          sennosides-docusate Take 1 tablet 30 tablet 3         10/08/2017      

     Active



          sodium (SENNA PLUS) by mouth                                          



          8.6-50 mg tablet daily.                                            

 

          hydroxychloroquine Take 1 tablet 30 tablet 3         10/08/2017       

    Active



          (PLAQUENIL) 200 mg tablet by mouth                                    

      



                    daily.                                            

 

          omeprazole (PRILOSEC) 20 Take 2    60 capsule 3         10/08/2017    

       Active



          mg delayed release capsules by                                        

 



          capsule   mouth daily.                                         

 

          mycophenolate (CELLCEPT) Take 3 tablets 90 tablet 3         10/08/2017

           Active



          500 mg tabletIndications: by mouth 2                                  

       



          Acquired hemolytic times daily.                                       

  



          anemia, Systemic lupus                                                

   



          erythematosus with                                                   



          glomerular disease,                                                   



          unspecified SLE type                                                  

 

 

                          predniSONE (DELTASONE) 20 Take 3 tablets by mouth alma

y Take 60mg (3 tablets) 

daily for 4 weeks 90 tablet    2            10/08/2017                Active



          mg tablet Take 50mg (2.5 tablets) daily for 2 weeks                   

                      



                    Then take 40mg (2 tablets) daily until seen in clinic.      

                                   

 

          ondansetron (ZOFRAN) 4 mg Take 1 tablet 20 tablet 0         10/08/2017

           Active



          tablet    by mouth every                                         



                    8 hours as                                         



                    needed for up                                         



                    to 5 doses for                                         



                    Nausea.                                           







Active Problems







                          Problem                   Noted Date

 

                          Thyroid lesion            10/01/2017

 

                          Lymphadenopathy, cervical 2017

 

                          Anemia                    2017

 

                          Systemic lupus erythematosus with glomerular disease 0

2017

 

                          Cold agglutinin disease   2017

 

                          Menorrhagia with regular cycle 

 

                          Uterine leiomyoma         

 

                          Acquired hemolytic anemia 

 

                          Current use of steroid medication 

 

                          High risk medication use  

 

                          Lupus nephritis           

 

                          Proteinuria               







Family History







                Medical History Relation        Name            Comments

 

                Heart failure   Father                          

 

                Diabetes        Maternal Grandfather                 

 

                Heart failure   Maternal Grandfather                 

 

                Stomach cancer  Maternal Grandmother                 

 

                Cerebral aneurysm Mother                          

 

                Heart failure   Mother                          

 

                Hyperthyroidism Mother                          









                Relation        Name            Status          Comments

 

                Father                                          

 

                Maternal Grandfather                                 

 

                Maternal Grandmother                                 

 

                Mother                                          







Social History







             Tobacco Use  Types        Packs/Day    Years Used   Date

 

             Never Smoker                                        









                Smokeless Tobacco: Never Used                                 









                Alcohol Use     Drinks/Week     oz/Week         Comments

 

                No                                              









                          Sex Assigned at Birth     Date Recorded

 

                          Not on file               









                    Job Start Date      Occupation          Industry

 

                    Not on file         Not on file         Not on file









                    Travel History      Travel Start        Travel End









                                        No recent travel history available.







Last Filed Vital Signs

Not on file



Plan of Treatment







                Health Maintenance Due Date        Last Done       Comments

 

                Cervical Cancer Scrn (3 Yrs) 2015                      

 

                IMM Influenza Seasonal Oct to March (>/= 19 yrs) 10/01/2020     

                 







Results

Not on fileafter 2019



Insurance







          Payer     Benefit Plan / Subscriber ID Effective Dates Phone     Addre

ss   Type



                    Group                                             

 

          [facility]      SELF-PAY  xxxxxxxxx 2017-Prese [phone] [id number] 



           SELF-PAY   UNSCREENED            [address]          [place], [place] 



                                                            [id number]     









           Guarantor Name Account Type Relation to Date of    Phone      Billing



                                 Patient    Birth                 Address

 

           Jayde Mcduffie Personal/Family Self       1995 [phone] [address]









             [address]                                             (Home)



                                        [place], [place]



                                                       [phone]



                                                       (Work)     







Advance Directives







                Code Status     Date Activated  Date Inactivated Comments

 

                Full Code       2017 12:28 PM 10/8/2017  8:31 PM

## 2020-09-18 NOTE — P.CNS
Date of Consult: 09/18/20


Reason for Consult: ESRD, fluid management 


Chief Complaint: abnormal lans 


History of Present Illness: 





HPI 


Pt with Hx of ESRD on HD MWF ,SLE, pericardial effusion in 2008 S/P window,  on 

cellcept and prednsisone  


pt had no blood w/u or Rhumatology and nephrology F/U for more than 6yrs


presented with asymptomatic anemia 


no chest pain, palpitation, nausea, vomiting or diarrhea 




















Physical exam 


general: AAOX3, NAD , obese


Neck; Supple, No elevated JVD 


hear: RRR, normal S1,2 no murmur or rub


Chest: CTAB, no rlaes or wheezes  


Abdomen: Soft , Nt 


Extremities No edema or ulcer











A/P 





ESRD  on HD MWF 


renal dose meds 


HD today  








acute on chronic anemia 


likely due to CKD and lupus 


transfuse 2PRBC with HD todayu  


cont epogen 








SLE 


cont meds





HTN 


resume home meds 








total time spent 30 min


Allergies





Penicillins Allergy (Verified 07/31/20 04:41)


   Hives/Rash





Home Medications: 








Aspirin [Ruth Ann Chewable Aspirin] 81 mg PO DAILY 09/04/20 


Atorvastatin Calcium [Lipitor*] 40 mg PO BEDTIME 09/04/20 


Citalopram [Celexa*] 10 mg PO BEDTIME 09/04/20 


Ferrous Sulfate [Ferrous Sulfate*] 325 mg PO TID 09/04/20 


Hydroxychloroquine [Plaquenil*] 200 mg PO DAILY 09/04/20 


Metoprolol Tartrate [Lopressor*] 25 mg PO BID 09/04/20 


Mycophenolate Mofetil [Cellcept] 500 mg PO BID 09/04/20 


Pantoprazole [Protonix Tab*] 40 mg PO DAILY 09/04/20 


levETIRAcetam [Keppra*] 500 mg PO BID 09/04/20 


predniSONE [Deltasone*] 10 mg PO DAILY 09/04/20 


levETIRAcetam [Keppra*] 500 mg PO BID #60 tab 09/06/20 








- Past Medical/Surgical History


Diabetic: No


-: Lupus


-: Acute renal failure/ ESRD/dialysis pt


-: Congestive heart failure status post pericardial window in 2008


-: Anemia of chronic disease


-: Hypertension


-: Depression with anxiety


-: Right kidney biopsy


-: Pericardial window


-: Tessio placement


Psychosocial/ Personal History: .  Lives at home with .  No 

children





- Family History


  ** Mother


Medical History: Heart disease, Kidney disease





  ** Aunt


Medical History: Diabetes





  ** Grandmother


Medical History: Diabetes





- Social History


Alcohol use: No


CD- Drugs: No


Caffeine use: No





Physical Examination


Laboratory Data (last 24 hrs)





09/18/20 11:57: PT 10.7, INR 0.91, APTT 28.1


09/18/20 11:57: Sodium 143, Potassium 3.4 L, BUN 28 H, Creatinine 4.97 H, 

Glucose 85


09/18/20 11:57: WBC 2.7 L D, Hgb 6.8 L*, Hct 20.3 L*, Plt Count 115 L

## 2020-09-18 NOTE — XMS REPORT
Continuity of Care Document

                          Created on:2020



Patient:NELLIE JOLLY

Sex:Female

:1995

External Reference #:[id number]





Demographics







                          Address                   [address] [address]



                                                    [place] [address]

 

                          Home Phone                [phone]

 

                          Work Phone                [phone]

 

                          Mobile Phone              [phone]

 

                          Email Address             [email or web address]

 

                          Preferred Language        English

 

                          Marital Status            Unknown

 

                          Nondenominational Affiliation     Unknown

 

                          Race                      Unknown

 

                          Additional Race(s)        White



                                                    Unavailable



                                                    Unavailable



                                                    Black or 

 

                          Ethnic Group              Not  or 









Author







                          Organization              [facility]

t

 

                          Address                   [address]



                                                    [place] [address]

 

                          Phone                     [phone]









Support







                Name            Relationship    Address         Phone

 

                Yeny Tay  Life Partner    [address] [phone]





                                                [place] [address] 

 

                [employer] leon   Other           [address] [phone]



                                                         



                                                [place] [address] 

 

                [employer]   Aunt            [address]   [phone]



                                                [place] [address] 









Care Team Providers







                    Name                Role                Phone

 

                    Asked, No Pcp       Primary Care Physician Unavailable

 

                    David NP,  G       Attending Clinician [phone]

 

                    Doctor Unassigned,  Name Attending Clinician Unavailable









Problems







       Condition Condition Condition Status Onset  Resolution Last   Treating Co

mments 

Source



       Name   Details Category        Date   Date   Treatment Clinician        



                                                 Date                 

 

       Thyroid Thyroid Disease Active 2017                             [facility]



       lesion lesion               0                               Health



                                   00:00:                             



                                   00                                 

 

       Lymphadeno Lymphadeno Disease Active                              H

bharat starks,                                              Health



       cervical cervical               00:00:                             



                                   00                                 

 

       Anemia Anemia Disease Active                              [facility]



                                                                  Health



                                   00:00:                             



                                   00                                 

 

       Systemic Systemic Disease Active                              Cris lorenzo



       lupus  lupus                                               Health



       erythemato erythemato               00:00:                             



       blanche with blanche with               00                                 



       glomerular glomerular                                                  



       disease disease                                                  

 

       Cold   Cold   Disease Active                              [facility]



       agglutinin agglutinin                                              He

alth



       disease disease               00:00:                             



                                   00                                 

 

       Menorrhagi Menorrhagi Disease Active                                    H

myriamis



       a with a with                                                  Health



       regular regular                                                  



       cycle  cycle                                                   

 

       Uterine Uterine Disease Active                                    [facility]



       leiomyoma leiomyoma                                                  [facility]

th

 

       Acquired Acquired Disease Active                                    [facility] lorenzo



       hemolytic hemolytic                                                  [facility]

th



       anemia anemia                                                  

 

       Current Current Disease Active                                    [facility]



       use of use of                                                  Health



       steroid steroid                                                  



       medication medication                                                  

 

       High risk High risk Disease Active                                    [facility]

ris



       medication medication                                                  He

alth



       use    use                                                     

 

       Lupus  Lupus  Disease Active                                    [place]



       nephritis nephritis                                                  [facility]

th

 

       Proteinuri Proteinuri Disease Active                                    H

[facility]



       a      a                                                       Health







Allergies, Adverse Reactions, Alerts







       Allergy Allergy Status Severity Reaction(s) Onset  Inactive Treating Comm

ents 

Source



       Name   Type                        Date   Date   Clinician        

 

       Penicill Propensi Active        Shortness Of                       

[facility]



       ins    ty to                Breath                         Methodi



              adverse                      00:00:                      st



              reaction                      00                          



              s to                                                    



              drug                                                    

 

       Amoxicil Propensi Active                                     [place]



       hansa-Pot ty to                                               Health



       Clavulan adverse                      00:00:                      



       ate    reaction                      00                          



              s to                                                    



              drug                                                    

 

       Penicill Propensi Active                                     [place]



       ins    ty to                                               Health



              adverse                      00:00:                      



              reaction                      00                          



              s to                                                    



              drug                                                    







Family History







           Family Member Diagnosis  Comments   Start Date Stop Date  Source

 

           Natural father Heart failure                                  [facility]

 

           Maternal grandfather Diabetes                                    [facility]

 

           Maternal grandfather Heart failure                                  H

[facility]

 

           Maternal grandmother Stomach cancer                                  

[facility]

 

           Natural mother Cerebral aneurysm                                  [facility]

 

           Natural mother Heart failure                                  [facility]

 

           Natural mother Hyperthyroidism                                  [facility]

s [facility]







Social History







           Social Habit Start Date Stop Date  Quantity   Comments   Source

 

           Sex Assigned At                                             [place] [facility]

alth



           Birth                                                  

 

           Alcohol Comment 2018 Occasionally            [facility]



                      00:00:00   00:00:00                         [facility]

 

           Alcohol intake 2017-10-01 2017-10-01 Current non-drinker            H

[facility]



                      00:00:00   00:00:00   of alcohol            



                                            (finding)             









                Smoking Status  Start Date      Stop Date       Source

 

                Never smoker                                    [facility]







Medications







       Ordered Filled Start  Stop   Current Ordering Indication Dosage Frequency

 Signature

                    Comments            Components          Source



     Medication Medication Date Date Medication? Clinician                (SIG) 

          



     Name Name                                                   

 

     hydroxychlo      2017      Yes            200mg QD   Take 200           H

arris



     roquine      0-08                               mg by           [facility]



     (PLAQUENIL)      18:25:                               mouth           



     200 mg      57                                 daily.           



     tablet                                                        

 

     ferrous      2017      Yes            325mg QD   Take 325           [facility]

s



     sulfate 325      0-08                               mg by           [facility]



     mg (65 mg      18:25:                               mouth           



     iron)      57                                 daily           



     tablet                                         (with           



                                                  breakfast)           



                                                  .              

 

     aspirin      2017      Yes            81mg QD   Chew and           [facility]



     (ASPIRIN)      0-08                               swallow 81           [facility]al

th



     81 mg      18:25:                               mg by           



     chewable      57                                 mouth           



     tablet                                         daily.           

 

     sertraline      2017      Yes            50mg QD   Take 50 mg           H

arrketan



     (ZOLOFT) 50      0-08                               by mouth           Heal

th



     mg tablet      18:25:                               daily.           



               57                                                

 

     atovaquone      2017      Yes            1500mg QD   Take 10 mL          

 [facility]



     (MEPRON)      0-08                               by mouth           Health



     750 mg/5 mL      00:00:                               daily.           



     oral      00                                                



     suspension                                                        

 

     folic acid      2017      Yes            1mg  QD   Take 1           [facility]

s



     (FOLVITE) 1      0-08                               tablet by           [facility]

lth



     mg tablet      00:00:                               mouth           



               00                                 daily.           

 

     sennosides-      2017      Yes            1{tbl} QD   Take 1           [facility]

rris



     docusate      0-08                               tablet by           Health



     sodium      00:00:                               mouth           



     (SENNA      00                                 daily.           



     PLUS)                                                        



     8.6-50 mg                                                        



     tablet                                                        

 

     hydroxychlo      2017      Yes            200mg QD   Take 1           [facility]

ris



     roquine      0-08                               tablet by           [facility]



     (PLAQUENIL)      00:00:                               mouth           



     200 mg      00                                 daily.           



     tablet                                                        

 

     omeprazole      2017      Yes            40mg QD   Take 2           [facility]

s



     (PRILOSEC)      0-08                               capsules           Healt

h



     20 mg      00:00:                               by mouth           



     delayed      00                                 daily.           



     release                                                        



     capsule                                                        

 

     mycophenola      2017      Yes       Systemic 1500mg Q.5D Take 3         

  [facility]



     te        0-08                lupus           tablets by           [facility]



     (CELLCEPT)      00:00:                erythematos           mouth 2        

   



     500 mg      00                  us with           times           



     tablet                          glomerular           daily.           



                                   disease,                          



                                   unspecified                          



                                   SLE type                          

 

     predniSONE      2017      Yes            60mg QD   Take 3           [facility]

s



     (DELTASONE)      0-08                               tablets by           He

alth



     20 mg      00:00:                               mouth           



     tablet      00                                 daily Take           



                                                  60mg (3           



                                                  tablets)           



                                                  daily for           



                                                  4              



                                                  weeksTake           



                                                  50mg (2.5           



                                                  tablets)           



                                                  daily for           



                                                  2              



                                                  weeksThen           



                                                  take 40mg           



                                                  (2             



                                                  tablets)           



                                                  daily           



                                                  until seen           



                                                  in clinic.           

 

     ondansetron      2017      Yes            4mg       Take 1           [facility]enn[facility]

is



     (ZOFRAN) 4      0-08                               tablet by           Heal

th



     mg tablet      00:00:                               mouth           



               00                                 every 8           



                                                  hours as           



                                                  needed for           



                                                  up to 5           



                                                  doses for           



                                                  Nausea.           







Procedures

This patient has no known procedures.



Plan of Care







             Planned Activity Planned Date Details      Comments     Source

 

             Future Scheduled 2020-10-01   IMM Influenza              [facility]



             Test         00:00:00     Seasonal Oct to              



                                       March (>/= 19 yrs)              



                                       [code = IMM               



                                       Influenza Seasonal              



                                       Oct to March (>/= 19              



                                       yrs)]                     

 

             Future Scheduled 2020   INFLUENZA VACCINE              [facility]



             Test         00:00:00     [code = INFLUENZA              



                                       VACCINE]                  

 

             Future Scheduled 2015   Screening for              [facility]

thodist



             Test         00:00:00     malignant neoplasm              



                                       of cervix                 



                                       (procedure) [code =              



                                       442388317]                

 

             Future Scheduled 2015   Screening for              [facility]

lt



             Test         00:00:00     malignant neoplasm              



                                       of cervix                 



                                       (procedure) [code =              



                                       854579422]                







Encounters







        Start   End     Encounter Admission Attending Care    Care    Encounter 

Source



        Date/Time Date/Time Type    Type    Clinicians Facility Department ID   

   

 

        2020 Emergency         [facility]    [id number]

[id number] 



        17:16:57 20:21:00                 Caryl Cox [id number]         



                                                [place] [id number]         



                                                [place]  [phone]         



                                                        084             

 

        2020 Orders          Doctor  CARMELA    [id number]

[id number] 



        00:00:00 00:00:00 Only            Unassigned, MALIKA   [id number]       

  



                                        [place] [id number]         



                                                        [phone]         



                                                        009             

 

        2017-11-10 2017-11-10 Outpatient                 [facility]     [id number]

72 [place]



        00:00:00 00:00:00                                                 Health

 

        2017-10-03 2017-10-03 Outpatient                 [facility]     [id number]

78 [place]



        07:37:31 07:37:31                                                 Health

 

        2017-10-01 2017-10-01 Outpatient                 [facility]     [id number]

67 [place]



        07:21:26 07:21:26                                                 Health

 

        2017 Outpatient                 [facility]     [id number]

80 [place]



        15:25:08 15:25:08                                                 Health

 

        2017 Outpatient                 [facility]     [id number]

51 [place]



        10:38:27 10:38:27                                                 Health

 

        2017 Emergency                 [facility]     [id number]

2 [place]



        04:19:42 04:19:42                                                 Health

 

        2017 [facility]patient                 [facility]     [id number]

0 [place]



        00:52:05 00:52:05                                                 [facility]

 

        2017 Emergency                 [facility]     [facility]     [id number]

4 [place]



        18:41:35 18:41:35                                                 Health







Results

This patient has no known results.

## 2020-09-18 NOTE — P.HP
Certification for Inpatient


Patient admitted to: Observation


With expected LOS: <2 Midnights


Patient will require the following post-hospital care: None


Practitioner: I am a practitioner with admitting privileges, knowledge of 

patient current condition, hospital course, and medical plan of care.


Services: Services provided to patient in accordance with Admission requirements

found in Title 42 Section 412.3 of the Code of Federal Regulations





Patient History


Date of Service: 09/18/20


Reason for admission: abnormal lans 


History of Present Illness: 





25-year-old  female with history of lupus nephritis, end-stage 

renal disease on chronic hemodialysis (M-W-F), hypertension, seizure disorder  

presents emergency department for chief complaint of abnormal lab results.  

Patient reports that she had labs drawn on 9/17/20 and received a call today to 

present to ED for further evaluation and management for a low hemoglobin.   When

patient was worked up in the emergency department she was found to have a 

hemoglobin of 6.7.  Patient does report being a little bit more sleepy the past 

few days.  Patient was scheduled for dialysis today, but missed her appointment 

due to presenting to the ED.  Her nephrologist was contacted in the ED, and 

plans for patient to be admitted to receive hemodialysis and 2 units of PRBCs.  

She otherwise denies vision changes, chest pain, shortness of breath, abdominal 

pain, dysuria, change in bowel habits, no rashes, no lesions, no bleeding from 

anywhere.








Allergies





Penicillins Allergy (Verified 07/31/20 04:41)


   Hives/Rash





Home Medications: 








Aspirin [Ruth Ann Chewable Aspirin] 81 mg PO DAILY 09/04/20 


Atorvastatin Calcium [Lipitor*] 40 mg PO BEDTIME 09/04/20 


Citalopram [Celexa*] 10 mg PO BEDTIME 09/04/20 


Ferrous Sulfate [Ferrous Sulfate*] 325 mg PO TID 09/04/20 


Hydroxychloroquine [Plaquenil*] 200 mg PO DAILY 09/04/20 


Metoprolol Tartrate [Lopressor*] 25 mg PO BID 09/04/20 


Mycophenolate Mofetil [Cellcept] 500 mg PO BID 09/04/20 


Pantoprazole [Protonix Tab*] 40 mg PO DAILY 09/04/20 


levETIRAcetam [Keppra*] 500 mg PO BID 09/04/20 


predniSONE [Deltasone*] 10 mg PO DAILY 09/04/20 


levETIRAcetam [Keppra*] 500 mg PO BID #60 tab 09/06/20 








- Past Medical/Surgical History


Diabetic: No


-: Lupus


-: Acute renal failure/ ESRD/dialysis pt


-: Congestive heart failure status post pericardial window in 2008


-: Anemia of chronic disease


-: Hypertension


-: Depression with anxiety


-: Right kidney biopsy


-: Pericardial window


-: Tessio placement


Psychosocial/ Personal History: .  Lives at home with .  No 

children





- Family History


  ** Mother


-: Heart disease, Kidney disease





  ** Aunt


-: Diabetes





  ** Grandmother


-: Diabetes





- Social History


Smoking Status: Never smoker


Alcohol use: No


CD- Drugs: No


Caffeine use: No





Review of Systems


10-point ROS is otherwise unremarkable





Physical Examination





- Physical Exam


General: Alert, In no apparent distress


HEENT: Other (Dry mucous membranes), EOMI, Sclerae nonicteric


Respiratory: Clear to auscultation bilaterally, Normal air movement


Cardiovascular: No edema, Regular rate/rhythm, Normal S1 S2


Gastrointestinal: Soft and benign, Non-distended, No tenderness


Musculoskeletal: No tenderness


Integumentary: No rashes


Neurological: Normal speech, Normal affect





- Studies


Laboratory Data (last 24 hrs)





09/18/20 11:57: PT 10.7, INR 0.91, APTT 28.1


09/18/20 11:57: Sodium 143, Potassium 3.4 L, BUN 28 H, Creatinine 4.97 H, 

Glucose 85


09/18/20 11:57: WBC 2.7 L D, Hgb 6.8 L*, Hct 20.3 L*, Plt Count 115 L








Assessment and Plan





- Advance Directives


Does patient have a Living Will: No


Does patient have a Durable POA for Healthcare: No


Physician Review Additional Text: 





Anemia of chronic disease


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


Seizure disorder


Hypertension


Depression with anxiety








Anemia of chronic disease:  


Patient be transfused 2 units packed red blood cells with dialysis


No acute bleed


Anticipate discharge tomorrow morning after completed blood transfusion. 





End-stage renal disease on hemodialysis secondary to lupus nephritis  Obtain and

 continue patient's home medications.  Patient completed dialysis today.  At 

discharge patient will continue with dialysis on Monday unless there is 

indication for more urgent dialysis.





Seizure disorder  - Continue Keppra


Hypertension  - Continue metoprolol


Depression with anxiety  - Continue citalopram





Dispo:  To be transfused 2 PRBCs with dialysis today, patient has positive 

antibodies


Likely discharge home tomorrow


Time Spent Managing Pts Care (In Minutes): 45

## 2020-09-19 VITALS — DIASTOLIC BLOOD PRESSURE: 57 MMHG | SYSTOLIC BLOOD PRESSURE: 105 MMHG | TEMPERATURE: 98 F

## 2020-09-19 LAB
ANISOCYTOSIS BLD QL: (no result)
BLD SMEAR INTERP: (no result)
BUN BLD-MCNC: 10 MG/DL (ref 7–18)
GLUCOSE SERPLBLD-MCNC: 78 MG/DL (ref 74–106)
HCT VFR BLD CALC: 26.7 % (ref 36–45)
LYMPHOCYTES # SPEC AUTO: 0.8 K/UL (ref 0.7–4.9)
MORPHOLOGY BLD-IMP: (no result)
PMV BLD: 8.2 FL (ref 7.6–11.3)
POTASSIUM SERPL-SCNC: 3.4 MMOL/L (ref 3.5–5.1)
RBC # BLD: 2.98 M/UL (ref 3.86–4.86)

## 2020-09-19 PROCEDURE — 30233N1 TRANSFUSION OF NONAUTOLOGOUS RED BLOOD CELLS INTO PERIPHERAL VEIN, PERCUTANEOUS APPROACH: ICD-10-PCS

## 2020-09-19 PROCEDURE — 5A1D70Z PERFORMANCE OF URINARY FILTRATION, INTERMITTENT, LESS THAN 6 HOURS PER DAY: ICD-10-PCS

## 2020-09-19 NOTE — P.DS
Admission Date: 09/19/20


Discharge Date: 09/19/20


Disposition: ROUTINE DISCHARGE


Discharge Condition: GOOD


Reason for Admission: abnormal lans 


Consultations: 





Nephrology for dialysis


Brief History of Present Illness: 





25-year-old  female with history of lupus nephritis, end-stage 

renal disease on chronic hemodialysis (M-W-F), hypertension, seizure disorder  

presents emergency department for chief complaint of abnormal lab results.  

Patient reports that she had labs drawn on 9/17/20 and received a call today to 

present to ED for further evaluation and management for a low hemoglobin.   When

patient was worked up in the emergency department she was found to have a 

hemoglobin of 6.7.  Patient does report being a little bit more sleepy the past 

few days.  Patient was scheduled for dialysis today, but missed her appointment 

due to presenting to the ED.  Her nephrologist was contacted in the ED, and 

plans for patient to be admitted to receive hemodialysis and 2 units of PRBCs.  

She otherwise denies vision changes, chest pain, shortness of breath, abdominal 

pain, dysuria, change in bowel habits, no rashes, no lesions, no bleeding from 

anywhere.








Hospital Course: 





Patient underwent hemodialysis and received 2uPRBCs without complications. On 

day of discharge she was feeling well, no evidence of bleeding, no SOB/fatigue. 

Vital signs were stable.


Hemoglobin increased from 6.8-8.9.  She was discharged home to continue dialysis

as previously scheduled and continue home medications as previously prescribed. 








Vital Signs/Physical Exam: 














Temp Pulse Resp BP Pulse Ox


 


 98 F   82   18   105/57 L  100 


 


 09/19/20 08:00  09/19/20 08:00  09/19/20 08:00  09/19/20 08:00  09/19/20 08:00








General: Alert, In no apparent distress


HEENT: Mucous membr. moist/pink, Sclerae nonicteric


Neck: Supple


Respiratory: Clear to auscultation bilaterally, Normal air movement


Cardiovascular: No edema, Normal pulses, Regular rate/rhythm, Normal S1 S2


Gastrointestinal: Soft and benign, Non-distended, No tenderness


Musculoskeletal: No erythema, No tenderness


Integumentary: No rashes


Neurological: Normal speech, Normal affect


Laboratory Data at Discharge: 














WBC  2.9 K/uL (4.3-10.9)  L  09/19/20  06:16    


 


Hgb  8.9 g/dL (12.0-15.0)  L  09/19/20  06:16    


 


Hct  26.7 % (36.0-45.0)  L D 09/19/20  06:16    


 


Plt Count  93 K/uL (152-406)  L*  09/19/20  06:16    


 


PT  10.7 SECONDS (9.5-12.5)   09/18/20  11:57    


 


INR  0.91   09/18/20  11:57    


 


APTT  28.1 SECONDS (24.3-36.9)   09/18/20  11:57    


 


Sodium  141 mmol/L (136-145)   09/19/20  06:16    


 


Potassium  3.4 mmol/L (3.5-5.1)  L  09/19/20  06:16    


 


BUN  10 mg/dL (7-18)   09/19/20  06:16    


 


Creatinine  2.55 mg/dL (0.55-1.3)  H D 09/19/20  06:16    


 


Glucose  78 mg/dL ()   09/19/20  06:16    








Home Medications: 








Atorvastatin Calcium [Lipitor*] 40 mg PO BEDTIME 09/04/20 


Citalopram [Celexa*] 10 mg PO BEDTIME 09/04/20 


Hydroxychloroquine [Plaquenil*] 200 mg PO DAILY 09/04/20 


Metoprolol Tartrate [Lopressor*] 25 mg PO BID 09/04/20 


Mycophenolate Mofetil [Cellcept] 500 mg PO BID 09/04/20 


Pantoprazole [Protonix Tab*] 40 mg PO DAILY 09/04/20 


predniSONE [Deltasone*] 10 mg PO DAILY 09/04/20 


levETIRAcetam [Keppra*] 500 mg PO BID #60 tab 09/06/20 


Venlafaxine HCl 37.5 mg PO DAILY 09/18/20 





Patient Discharge Instructions: Follow up with PCP. Continue dialysis as 

scheduled.


Diet: Renal


Activity: Ad pallavi


Time spent managing pt's care (in minutes): 35

## 2021-02-08 ENCOUNTER — HOSPITAL ENCOUNTER (EMERGENCY)
Dept: HOSPITAL 97 - ER | Age: 26
Discharge: HOME | End: 2021-02-08
Payer: COMMERCIAL

## 2021-02-08 VITALS — TEMPERATURE: 98 F

## 2021-02-08 VITALS — OXYGEN SATURATION: 97 % | DIASTOLIC BLOOD PRESSURE: 93 MMHG | SYSTOLIC BLOOD PRESSURE: 125 MMHG

## 2021-02-08 DIAGNOSIS — R42: ICD-10-CM

## 2021-02-08 DIAGNOSIS — S16.1XXA: Primary | ICD-10-CM

## 2021-02-08 DIAGNOSIS — F10.10: ICD-10-CM

## 2021-02-08 DIAGNOSIS — M32.15: ICD-10-CM

## 2021-02-08 DIAGNOSIS — Z99.2: ICD-10-CM
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
## 2021-02-08 LAB
BLD SMEAR INTERP: (no result)
BUN BLD-MCNC: 50 MG/DL (ref 7–18)
GLUCOSE SERPLBLD-MCNC: 71 MG/DL (ref 74–106)
HCT VFR BLD CALC: 33.5 % (ref 36–45)
LYMPHOCYTES # SPEC AUTO: 1 K/UL (ref 0.7–4.9)
MORPHOLOGY BLD-IMP: (no result)
PMV BLD: 8.4 FL (ref 7.6–11.3)
POTASSIUM SERPL-SCNC: 4.3 MMOL/L (ref 3.5–5.1)
RBC # BLD: 3.61 M/UL (ref 3.86–4.86)

## 2021-02-08 PROCEDURE — 70360 X-RAY EXAM OF NECK: CPT

## 2021-02-08 PROCEDURE — 85025 COMPLETE CBC W/AUTO DIFF WBC: CPT

## 2021-02-08 PROCEDURE — 71045 X-RAY EXAM CHEST 1 VIEW: CPT

## 2021-02-08 PROCEDURE — 36415 COLL VENOUS BLD VENIPUNCTURE: CPT

## 2021-02-08 PROCEDURE — 99283 EMERGENCY DEPT VISIT LOW MDM: CPT

## 2021-02-08 PROCEDURE — 80048 BASIC METABOLIC PNL TOTAL CA: CPT

## 2021-02-08 NOTE — RAD REPORT
EXAM DESCRIPTION:  RAD - Neck Soft Tissue - 2/8/2021 5:28 pm

 

CLINICAL HISTORY:  claudia catheter placement

 

COMPARISON:  None.

 

TECHNIQUE:  AP and lateral views the neck obtained.

 

FINDINGS:  No prevertebral soft tissue thickening. No foreign body or abnormal air density. Epiglotti
s is normal.  Tonsillar and adenoid tissue within normal limits as well.  No disk or bony abnormality
.

 

Right-sided Claudia catheter shows no abnormal bend or kink of the tubing. Positioning is similar to 
comparison. No air or foreign body in the soft tissues. Soft tissue surrounding the catheter are not 
outside the range of normal.

 

IMPRESSION:  Unremarkable soft tissue neck examination.

 

Soft tissues near the Claudia catheter are not clearly outside of normal range.

## 2021-02-08 NOTE — ER
Nurse's Notes                                                                                     

 [facility]                                                                 

Name: Jayde Mcduffie                                                                               

Age: 26 yrs                                                                                       

Sex: Female                                                                                       

: 1995                                                                                   

MRN: [id number]                                                                                   

Arrival Date: 2021                                                                          

Time: 12:39                                                                                       

Account#: [id number]                                                                            

Bed 14                                                                                            

Private MD:                                                                                       

Diagnosis: Alcohol abuse;Strain of muscle, fascia and tendon at neck level-neck pain              

                                                                                                  

Presentation:                                                                                     

                                                                                             

12:56 Chief complaint: Patient states: dizziness and neck pain where her HD catheter is (was  sv  

      placed 2020) x 3 days. HD was not done today. Coronavirus screen: Client denies        

      travel out of the [place] in the last 14 days. At this time, the client does not indicate      

      any symptoms associated with coronavirus-19. Ebola Screen: No symptoms or risks             

      identified at this time. Risk Assessment: Do you want to hurt yourself or someone else?     

      Patient reports no desire to harm self or others. Onset of symptoms was 2021.                                                                                       

12:56 Method Of Arrival: Ambulatory                                                           sv  

12:56 Acuity: ADRIAN 3                                                                           sv  

12:58 Initial Sepsis Screen: Does the patient meet any 2 criteria? No. Patient's initial      sv  

      sepsis screen is negative. Does the patient have a suspected source of infection? No.       

      Patient's initial sepsis screen is negative.                                                

                                                                                                  

Triage Assessment:                                                                                

13:00 General: Appears in no apparent distress. comfortable, Behavior is calm, cooperative,   sv  

      appropriate for age. Pain: Complains of pain in right neck area. Neuro: Level of            

      Consciousness is awake, alert, obeys commands, Oriented to person, place, time,             

      situation, Gait is steady. Respiratory: Respiratory effort is even, unlabored.              

                                                                                                  

Historical:                                                                                       

- Allergies:                                                                                      

12:58 PENICILLINS;                                                                            sv  

- PMHx:                                                                                           

12:58 Anemia; Dialysis; Lupus;                                                                sv  

- PSHx:                                                                                           

12:58 Dialysis catheter to chest;                                                             sv  

                                                                                                  

- Immunization history:: Adult Immunizations up to date.                                          

- Social history:: Smoking status: Patient denies any tobacco usage or history of.                

                                                                                                  

                                                                                                  

Screenin:16 Abuse screen: Denies threats or abuse. Nutritional screening: No deficits noted.        ll1 

      Tuberculosis screening: No symptoms or risk factors identified. Fall Risk Secondary         

      diagnosis (15 points) dizzy. IV access (20 points). Total Porter Fall Scale indicates        

      Low Risk Score (25-44 pts). Fall prevention measures have been instituted. Side Rails       

      Up X 2 Placed close to Nursing Station Frequent Obs/Assesments occuring As available        

      Patient and Family Educated on Fall Prevention Program and strategies.                      

                                                                                                  

Assessment:                                                                                       

17:00 Reassessment: No changes from previously documented assessment. Patient and/or family   ll1 

      updated on plan of care and expected duration. Pain level reassessed. Patient is alert,     

      oriented x 3, equal unlabored respirations, skin warm/dry/pink.                             

18:00 Reassessment: No changes from previously documented assessment. Patient and/or family   ll1 

      updated on plan of care and expected duration. Pain level reassessed.                       

19:10 Reassessment: Patient appears in no apparent distress at this time. Patient and/or      jb4 

      family updated on plan of care and expected duration. Pain level reassessed. Patient is     

      alert, oriented x 3, equal unlabored respirations, skin warm/dry/pink.                      

20:01 Reassessment: Patient appears in no apparent distress at this time. Patient and/or      jb4 

      family updated on plan of care and expected duration. Pain level reassessed. Patient is     

      alert, oriented x 3, equal unlabored respirations, skin warm/dry/pink.                      

                                                                                                  

Vital Signs:                                                                                      

12:58  / 82; Pulse 89; Resp 16; Temp 98; Pulse Ox 100% ; Weight 54.43 kg; Height 4 ft.  sv  

      11 in. (149.86 cm); Pain 4/10;                                                              

19:15  / 93; Pulse 87; Resp 16; Pulse Ox 97% on R/A;                                    jb4 

12:58 Body Mass Index 24.24 (54.43 kg, 149.86 cm)                                             sv  

                                                                                                  

ED Course:                                                                                        

12:39 Patient arrived in ED.                                                                  mr  

12:57 Triage completed.                                                                       sv  

12:58 Arm band placed on.                                                                     sv  

17:01 Jackson Campuzano, ALDA is PHCP.                                                           pm1 

17:02 Camron Paris MD is Attending Physician.                                                pm1 

17:02 Amish Madera, RN is Primary Nurse.                                                     ll1 

17:28 Chest Single View XRAY In Process Unspecified.                                          EDMS

17:28 Neck Soft Tissue XRAY In Process Unspecified.                                           EDMS

18:00 Inserted saline lock: 22 gauge in right antecubital area, using aseptic technique.      ll1 

      Blood collected.                                                                            

18:16 Patient has correct armband on for positive identification. Bed in low position. Call   ll1 

      light in reach. Side rails up X 1. Cardiac monitoring not applicable on this patient.       

20:01 No provider procedures requiring assistance completed. IV discontinued, intact,         jb4 

      bleeding controlled, No redness/swelling at site. Pressure dressing applied.                

                                                                                                  

Administered Medications:                                                                         

No medications were administered                                                                  

                                                                                                  

                                                                                                  

Outcome:                                                                                          

19:42 Discharge ordered by MD.                                                                pm1 

20:01 Discharged to home ambulatory.                                                          jb4 

20:01 Condition: stable                                                                           

20:01 Discharge instructions given to patient, Instructed on discharge instructions, follow       

      up and referral plans. Demonstrated understanding of instructions, follow-up care.          

20:02 Patient left the ED.                                                                    jb4 

                                                                                                  

Signatures:                                                                                       

Dispatcher [employer]                           EDMS                                                 

Rekha Harp, RN                    RN                                                      

Ratna Ying                                                   

EliseJackson garcia, NP                    NP   pm1                                                  

Humberto Villalobos RN                       RN   jb4                                                  

Amish Madera RN                       RN   ll1                                                  

                                                                                                  

**************************************************************************************************

## 2021-02-08 NOTE — EDPHYS
Physician Documentation                                                                           

 [facility]                                                                 

Name: Jayde Mcduffie                                                                               

Age: 26 yrs                                                                                       

Sex: Female                                                                                       

: 1995                                                                                   

MRN: [id number]                                                                                   

Arrival Date: 2021                                                                          

Time: 12:39                                                                                       

Account#: [id number]                                                                            

Bed 14                                                                                            

Private MD:                                                                                       

ED Physician Camron Paris                                                                         

HPI:                                                                                              

                                                                                             

18:03 This 26 yrs old Black Female presents to ER via Ambulatory with complaints of Dialysis  pm1 

      cath problem, Dizziness.                                                                    

18:03 The patient or guardian complains of pain, that is acute. The symptoms are located on   pm1 

      the right anterior aspect of neck, at the location of her claudia catheter bends at the     

      neck. Onset: The symptoms/episode began/occurred 3 day(s) ago. Associated signs and         

      symptoms: Pertinent positives: Dizziness, The patient admits to recent alcohol use or       

      smells of alcohol on examination. Associated signs and symptoms: Pertinent negatives:       

      chest pain, shortness of breath. The pain does not radiate. Severity of symptoms: in        

      the emergency department the symptoms are unchanged. Patient went to dialysis today and     

      reported her neck pain. They were concerned that the neck pain was due to the catheter      

      getting dislodge and Dr. Wray instructed the patient to report to the ER for                

      evaluation. Patient is also reporting dizziness but has been treating her neck pain and     

      headache with alcohol and herbal teas.                                                      

                                                                                                  

Historical:                                                                                       

- Allergies:                                                                                      

12:58 PENICILLINS;                                                                            sv  

- PMHx:                                                                                           

12:58 Anemia; Dialysis; Lupus;                                                                sv  

- PSHx:                                                                                           

12:58 Dialysis catheter to chest;                                                             sv  

                                                                                                  

- Immunization history:: Adult Immunizations up to date.                                          

- Social history:: Smoking status: Patient denies any tobacco usage or history of.                

                                                                                                  

                                                                                                  

ROS:                                                                                              

18:03 Constitutional: Negative for fever, chills, and weight loss.                            pm1 

18:03 Cardiovascular: Negative for chest pain, palpitations, and edema, Respiratory: Negative     

      for shortness of breath, cough, wheezing, and pleuritic chest pain, Abdomen/GI:             

      Negative for abdominal pain, nausea, vomiting, diarrhea, and constipation, Back:            

      Negative for injury and pain, MS/Extremity: Negative for injury and deformity, Skin:        

      Negative for injury, rash, and discoloration.                                               

18:03 Neck: Positive for pain with movement, tenderness.                                          

18:03 Neuro: Positive for dizziness, headache, Negative for numbness, tingling, weakness.         

                                                                                                  

Exam:                                                                                             

18:03 Constitutional:  This is a well developed, well nourished patient who is awake, alert,  pm1 

      and in no acute distress. Head/Face:  Normocephalic, atraumatic.                            

18:03 Chest/axilla:  Normal chest wall appearance and motion.  Nontender with no deformity.       

      No lesions are appreciated.                                                                 

18:03 Skin:  Warm, dry with normal turgor.  Normal color with no rashes, no lesions, and no       

      evidence of cellulitis. MS/ Extremity:  Pulses equal, no cyanosis.  Neurovascular           

      intact.  Full, normal range of motion.                                                      

18:03 Neck: External neck: tenderness, that is mild, of the  right sternocleidomastoid,           

      C-spine: vertebral tenderness, is not appreciated.                                          

18:03 Cardiovascular: Exam negative for  acute changes, Rate: normal, Rhythm: regular,            

      Pulses: no pulse deficits are appreciated.                                                  

18:03 Respiratory: Exam negative for  acute changes, respiratory distress, shortness of           

      breath.                                                                                     

18:03 Neuro: Exam negative for acute changes, Orientation: is normal, Mentation: is normal,       

      Motor: is normal, moves all fours.                                                          

                                                                                                  

Vital Signs:                                                                                      

12:58  / 82; Pulse 89; Resp 16; Temp 98; Pulse Ox 100% ; Weight 54.43 kg; Height 4 ft.  sv  

      11 in. (149.86 cm); Pain 4/10;                                                              

19:15  / 93; Pulse 87; Resp 16; Pulse Ox 97% on R/A;                                    jb4 

12:58 Body Mass Index 24.24 (54.43 kg, 149.86 cm)                                             sv  

                                                                                                  

MDM:                                                                                              

17:07 Patient medically screened.                                                             pm1 

19:28 Data reviewed: vital signs. Data interpreted: Pulse oximetry: on room air is 100 %.     pm1 

      Interpretation: normal. Counseling: I had a detailed discussion with the patient and/or     

      guardian regarding: the historical points, exam findings, and any diagnostic results        

      supporting the discharge/admit diagnosis, lab results, radiology results, the need for      

      outpatient follow up, to return to the emergency department if symptoms worsen or           

      persist or if there are any questions or concerns that arise at home.                       

19:38 Physician consultation: Alonso Alexander MD was contacted at 19:38, regarding  pm1 

      patient's condition, wants patient to contact dialysis center tomorrow and get her          

      dialysis. Communicated that with the patient and she understands. Discussed patient's       

      use of alcohol for headaches with Dr. Wray which is likely causing her dizziness.           

      Discussed with patient the need to stop using alcohol.                                      

                                                                                                  

                                                                                             

17:55 Order name: CBC with Diff                                                               pm1 

                                                                                             

17:55 Order name: BMP; Complete Time: 19:02                                                   pm1 

                                                                                             

17:11 Order name: Chest Single View XRAY; Complete Time: 18:03                                pm1 

                                                                                             

17:11 Order name: Neck Soft Tissue XRAY; Complete Time: 18:03                                 pm1 

                                                                                             

17:55 Order name: IV Saline Lock; Complete Time: 18:01                                        pm1 

                                                                                                  

Administered Medications:                                                                         

No medications were administered                                                                  

                                                                                                  

                                                                                                  

Disposition:                                                                                      

21 19:42 Discharged to Home. Impression: Strain of muscle, fascia and tendon at neck        

  level - neck pain, Alcohol abuse.                                                               

- Condition is Stable.                                                                            

- Discharge Instructions: Alcohol Use Disorder, Dizziness, Muscle Strain.                         

                                                                                                  

- Medication Reconciliation Form, Thank You Letter, Antibiotic Education, Prescription            

  Opioid Use form.                                                                                

- Follow up: Emergency Department; When: As needed; Reason: Worsening of condition.               

  Follow up: Private Physician; When: 2 - 3 days; Reason: Recheck today's complaints,             

  Continuance of care, Re-evaluation by your physician.                                           

- Problem is new.                                                                                 

- Symptoms have improved.                                                                         

- Notes: Call the dialysis center tomorrow and get dialysis tomorrow. Dr. Wray said               

  they should give you a seat tomorrow since you missed your dialysis today                       

                                                                                                  

                                                                                                  

Addendum:                                                                                         

2021                                                                                        

     18:54 Co-signature as Attending Physician, Camron Paris MD.                                    r
n

                                                                                                  

Signatures:                                                                                       

Dispatcher [employer]                           Rekha Green RN RN sv Nieto, Roman, MD MD rn Marinas, Patrick, NP                    NP   pm1                                                  

Humberto Villalobos RN                       RN   jb4                                                  

                                                                                                  

Corrections: (The following items were deleted from the chart)                                    

                                                                                             

19:44 19:42 2021 19:42 Discharged to Home. Impression: Neck pain; Alcohol abuse.        pm1 

      Condition is Stable. Forms are Medication Reconciliation Form, Thank You Letter,            

      Antibiotic Education, Prescription Opioid Use. Follow up: Emergency Department; When:       

      As needed; Reason: Worsening of condition. Follow up: Private Physician; When: 2 - 3        

      days; Reason: Recheck today's complaints, Continuance of care, Re-evaluation by your        

      physician. Problem is new. Symptoms have improved. pm1                                      

20:02 19:44 2021 19:42 Discharged to Home. Impression: Strain of muscle, fascia and     jb4 

      tendon at neck level - neck pain; Alcohol abuse. Condition is Stable. Discharge             

      Instructions: Muscle Strain, Dizziness, Alcohol Use Disorder. Forms are Medication          

      Reconciliation Form, Thank You Letter, Antibiotic Education, Prescription Opioid Use.       

      Follow up: Emergency Department; When: As needed; Reason: Worsening of condition.           

      Follow up: Private Physician; When: 2 - 3 days; Reason: Recheck today's complaints,         

      Continuance of care, Re-evaluation by your physician. Problem is new. Symptoms have         

      improved. pm1                                                                               

                                                                                                  

**************************************************************************************************

## 2021-02-08 NOTE — RAD REPORT
EXAM DESCRIPTION:  RAD - Chest Single View - 2/8/2021 5:28 pm

 

CLINICAL HISTORY:  Quiton catheter placement, chest pain near site of catheter

 

COMPARISON:  Portable September 2020

 

TECHNIQUE:  AP portable chest image was obtained 2/8/2021 5:28 pm .

 

FINDINGS:  Lungs are clear. Heart and vasculature are normal. No measurable pleural effusion and no p
neumothorax. No acute bony abnormality seen. No acute aortic finding. Right-sided hemodialysis cathet
er shows no change in positioning. No abnormal bend or kink of the tubing. No suspicious soft tissue 
finding identifiable.

 

IMPRESSION:  No acute cardiopulmonary process.

 

No significant change from comparison.

## 2021-02-09 NOTE — XMS REPORT
Clinical Summary

                           Created on:2021



Patient:Jayde Mcduffie

Sex:Female

:1995

External Reference #:[id number]





Demographics







                          Address                   [facility] [id number]



                                                    [place] [address]

 

                          Home Phone                [phone]

 

                          Mobile Phone              [phone]

 

                          Email Address             [email or web address]

 

                          Preferred Language        English

 

                          Marital Status            Unknown

 

                          Episcopalian Affiliation     Unknown

 

                          Race                      Black or 

 

                          Ethnic Group              Not  or 









Author







                          Organization              [facility]

 

                          Address                   [address]



                                                    [place] [address]









Support







                Name            Relationship    Address         Phone

 

                Marina Peng Unavailable     Unavailable     [phone]

 

                Yeny Tay   Unavailable     Unavailable     [phone]









Care Team Providers







                    Name                Role                Phone

 

                    Unavailable         Primary Care Provider Unavailable









Allergies







             Active Allergy Reactions    Severity     Noted Date   Comments

 

             Penicillins                            10/23/2020   







Medications

Not on file



Active Problems

Not on file



Encounters







             Date         Type         Specialty    Care Team    Description

 

             2021   Documentation Transplant   Kamala Newell 

 

             2021   Orders Only  Transplant   Karin Kirkpatrick P, SLE (

systemic lupus 

erythematosus related syndrome) ([facility]) (Primary Dx);



                                                    RN           ESRD (end stage

 renal disease) (Formerly [facility]);



                                                                 Pre-transplant 

evaluation for chronic kidney disease

 

             2021   Telephone    Transplant   Catherine Bravo Appointm

ent (I returned



                                                                 Miss Mcduffie phon

e call and



                                                                 she's aware I a

m waiting



                                                                 on eval auth fr

om [employer]



                                                                 Insurance. I se

nt a staff



                                                                 message to the 

financial



                                                                 team and per Vi

humza S she



                                                                 waiting on the 

case



                                                                 manager with Heather herrera to



                                                                 call her back w

[facility] eval



                                                                 auth)

 

             2021   Telephone    Transplant   Chris Peace Appointment

 

             2021   Documentation Transplant   Newell, Kamala 

 

             2021   Abstract     Transplant   Newell, Kamala 

 

             2021   Documentation Transplant   Newell, Kamala 

 

             2020   Documentation Transplant   Newell, Kamala 

 

             2020   Documentation Transplant   Newell, Kamala 

 

             2020   Abstract     Transplant   Newell, Kamala 

 

             2020   Documentation Transplant   Maggie Paredes  

 

             2020   Documentation Transplant   Newell, Kamala 

 

             2020   Documentation Transplant   Newell, Kamala 

 

             2020   Telephone    Transplant   Kamala Newell Appointment (

Cld pt to



                                                                 obtain confirm 

if she had



                                                                 recieved link f

or



                                                                 education video

 and



                                                                 consents. Per p

t she has



                                                                 not and gave me

 a new



                                                                 email address. 

)

 

             10/29/2020   Documentation Transplant   Kamala Newell 

 

             10/23/2020   Documentation Transplant   Kamala Newell 

 

             10/23/2020   Abstract     Transplant   Kamala Newell 

 

             10/20/2020   Documentation Transplant   Golden Mullins 

 

             10/20/2020   Abstract     Transplant   Golden Mullins 

 

             10/20/2020   Telephone    Transplant   Golden Mullins Kidney Transp

lant



                                                                 Pre-evaluation

 

             10/15/2020   Documentation Golden Ferrell 

 

             2020   Lab Requisition Lab                       



after 2020



Social History







             Tobacco Use  Types        Packs/Day    Years Used   Date

 

             Never Assessed                                        









                          Sex Assigned at Birth     Date Recorded

 

                          Not on file               







Last Filed Vital Signs







                Vital Sign      Reading         Time Taken      Comments

 

                Blood Pressure  -               -               

 

                Pulse           -               -               

 

                Temperature     -               -               

 

                Respiratory Rate -               -               

 

                Oxygen Saturation -               -               

 

                Inhaled Oxygen Concentration -               -               

 

                Weight          56.7 kg (125 lb) 10/23/2020  9:40 AM CDT 

 

                Height          149.9 cm (4' 11") 10/23/2020  9:40 AM CDT 

 

                Body Mass Index 25.25           10/23/2020  9:40 AM CDT 







Plan of Treatment

Not on file



Procedures







             Procedure Name Priority     Date/Time    Associated Diagnosis Comme

nts

 

             SARS-COV2/RT-PCR Routine      2020  7:00 PM              Resu

lts for this



             ([facility])              CDT                       procedure 

are in



                                                                 the results



                                                                 section.



after 2020



Results

SARS-CoV2/RT-PCR ([facility]) (2020  7:00 PM CDT)





             Component    Value        Ref Range    Performed At Pathologist



                                                                 Signature

 

             SARS-COV2/RT-PCR Negative     Not Detected, [facility]  [facility] 



                                       Negative, See [facility] 



                                       external report CENTER       



                                       for linked test              

 

             SARS-COV-2   [facility] 



             PERFORMING LAB                           [facility]       









                                        Specimen

 

                                        Other - Nasopharyngeal wall structure (b

margot structure)









                          Narrative                 Performed At

 

                          Negative results do not preclude [facility]



                          infection and should not be used as the sole 



                          basis for patient management decisions. 



                          Negative results must be combined with 



                          clinical observations, patient history, and 



                          epidemiological information. A false negative 



                          result may occur if a specimen is improperly 



                                        collected, transported or handled.



                                        



                                         



                                        



                          The limit of detection for this assay is 250 



                                        copies/mL.



                                        



                                         



                                        



                          This SARS CoV-2 test is a rapid, real-time 



                          RT-PCR test intended for the qualitative 



                          detection of nucleic acid from SARS-CoV-2 in a 



                          nasopharyngeal swab specimen collected from 



                          individuals suspected of COVID-19 by their 



                                        healthcare provider.



                                        



                                         



                                        



                          This test has not been Food and Drug 



                          Administration (FDA) cleared or approved and 



                          has been authorized by FDA under an Emergency 



                          Use Authorization (EUA). This EUA will be 



                          effective until the declaration that 



                          circumstances exist justifying the 



                          authorization of the emergency use of in vitro 



                          diagnostic tests for detection and/or 



                          diagnosis of COVID-19 is terminated under 



                          Section 564(b)(2) of the Act or the EUA is 



                                        revoked under Section 564(g) of the Act.



                                        



                                         



                                        



                                        Fact Sheet for Healthcare Providers:



                                        



                          https://www.[employer]/Documents/Xpert%20Xpre 



                          ss%20SARS%20CoV-2/Fact%20Sheets/3023802%20SAR 



                          S-COV-2%20HEALTHCARE%20PROVIDERS%20FACT%20SHEE 



                                        T.pdf



                                        



                                         



                                        



                                        Fact Sheet for Healthcare Patients:



                                        



                          https://www.[employer]/Documents/Xpert%20Xpre 



                          ss%20SARS%20CoV-2/Fact%20Sheets/3023801%20SAR 



                                        S-COV-2%20PATIENT%20FACT%20SHEET.pdf



                                        



                                         



                                        



                                        Performing Laboratory:



                                        



                                        [address]



                                        



                          [place] [address]         









                Performing Organization Address         City/State/Zipcode Phone

 Number

 

                [address]

 [address] [phone]



                CENTER                                          



after 2020



Insurance







          Payer     Benefit Plan / Group Subscriber ID Effective Dates Phone    

 Address   Type

 

          MEDICARE  MEDICARE A B [id number] 10/1/2020-Present                  

   Medicare









           Guarantor Name Account Type Relation to Date of Birth Phone      Bill

ing



                                 Patient                          Address

 

           McduffieDanay Personal/Family Self       1995 [phone]



                                                       (Home)     [id number]







                                                                  [place]



                                                                  [address]

## 2021-02-09 NOTE — XMS REPORT
Clinical Summary

                           Created on:2021



Patient:Jayde Mcduffie

Sex:Female

:1995

External Reference #:[id number]





Demographics







                          Address                   [address] [address]



                                                    [place] [address]

 

                          Mobile Phone              [phone]

 

                          Email Address             [email or web address]

 

                          Preferred Language        English

 

                          Marital Status            Single

 

                          Temple Affiliation     Unknown

 

                          Race                      Black or 

 

                          Ethnic Group              Not  or 









Author







                          Organization              [facility]

 

                          Address                   [address] [place] [address]









Support







                Name            Relationship    Address         Phone

 

                Patt Peng    Unavailable     [address] [place], [place] [phone]



                                                         



                                                [place] [address] 









Care Team Providers







                    Name                Role                Phone

 

                    Asked, No Pcp       Primary Care Provider Unavailable









Allergies







             Active Allergy Reactions    Severity     Noted Date   Comments

 

             Penicillins  Shortness Of Breath High         2018   







Medications

No known medications



Active Problems

Not on file



Encounters







             Date         Type         Specialty    Care Team    Description

 

             2021   Telephone    Transplant   Heather Ch MA Referral -

 Kidney Txp



after 2020



Surgical History







                Surgery         Date            Site/Laterality Comments

 

                LYMPH NODE BIOPSY                 Left            







Social History







             Tobacco Use  Types        Packs/Day    Years Used   Date

 

             Never Smoker                                        









                Smokeless Tobacco: Never Used                                 









                Alcohol Use     Drinks/Week     oz/Week         Comments

 

                Yes                                             Occasionally









                          Sex Assigned at Birth     Date Recorded

 

                          Not on file               







Last Filed Vital Signs

Not on file



Plan of Treatment







                Health Maintenance Due Date        Last Done       Comments

 

                COVID-19 VACCINE (1 of 2) 2010                      

 

                HEPATITIS C SCREENING 2012                      

 

                CERVICAL CANCER SCREENING 2015                      

 

                INFLUENZA VACCINE 2020      







Results

Not on fileafter 2020



Insurance







          Payer     Benefit Plan / Subscriber ID Effective Dates Phone     Addre

ss   Type



                    Group                                             

 

          [employer]      [employer] [id number] 2016-Present           [address]



                                        



                                                            [place] [address] 









           Guarantor Name Account Type Relation to Date of Birth Phone      Bill

ing Address



                                 Patient                          

 

           Jayde Mcduffie Personal/Family Self       1995            [address]



                                                                  [address] [address]







                                                                  [place] [address]

1







Advance Directives

For more information, please contact: [phone]





                Type            Date Recorded   Patient Representative Explanati

on

 

                Advance Directives, 2020  5:13 AM                 



                Living Will and                                 



                Medical Power of                                 



                                                        

 

                Advance Directives, 2020  5:10 AM                 



                Living Will and                                 



                Medical Power of

## 2021-02-09 NOTE — XMS REPORT
Clinical Summary

                           Created on:2021



Patient:Jayde Mcduffie

Sex:Female

:1995

External Reference #:[id number]





Demographics







                          Address                   [address] 



                                                    [place] [address]

 

                          Home Phone                [phone]

 

                          Work Phone                [phone]

 

                          Mobile Phone              [phone]

 

                          Preferred Language        ENG

 

                          Marital Status            Single

 

                          Mandaen Affiliation     Unknown

 

                          Race                      Unknown

 

                          Ethnic Group              Unknown









Author







                          Organization              [facility]

ict

 

                          Address                   [facility] [place] [address]









Support







                Name            Relationship    Address         Phone

 

                Patt Peng    Unavailable     [address] [facility] [address]  [phone]



                                                [place] [address] 









Care Team Providers







                    Name                Role                Phone

 

                    Unavailable         Primary Care Provider Unavailable









Allergies







             Active Allergy Reactions    Severity     Noted Date   Comments

 

             Amoxicillin-Pot Clavulanate                           2009   

 

             Penicillins                            2009   







Medications







          Medication Sig       Dispensed Refills   Start Date End Date  Status

 

          hydroxychloroquine Take 200 mg by           0                         

    Active



          (PLAQUENIL) 200 mg tablet mouth daily.                                

         

 

          ferrous sulfate 325 mg Take 325 mg by           0                     

        Active



          (65 mg iron) tablet mouth daily                                       

  



                    (with                                             



                    breakfast).                                         

 

          aspirin (ASPIRIN) 81 mg Chew and            0                         

    Active



          chewable tablet swallow 81 mg                                         



                    by mouth                                          



                    daily.                                            

 

          sertraline (ZOLOFT) 50 mg Take 50 mg by           0                   

          Active



          tablet    mouth daily.                                         

 

          atovaquone (MEPRON) 750 Take 10 mL by 210 mL    0         10/08/2017  

         Active



          mg/5 mL oral suspension mouth daily.                                  

       

 

          folic acid (FOLVITE) 1 mg Take 1 tablet 90 tablet 3         10/08/2017

           Active



          tablet    by mouth                                          



                    daily.                                            

 

          sennosides-docusate Take 1 tablet 30 tablet 3         10/08/2017      

     Active



          sodium (SENNA PLUS) by mouth                                          



          8.6-50 mg tablet daily.                                            

 

          hydroxychloroquine Take 1 tablet 30 tablet 3         10/08/2017       

    Active



          (PLAQUENIL) 200 mg tablet by mouth                                    

      



                    daily.                                            

 

          omeprazole (PRILOSEC) 20 Take 2    60 capsule 3         10/08/2017    

       Active



          mg delayed release capsules by                                        

 



          capsule   mouth daily.                                         

 

          mycophenolate (CELLCEPT) Take 3 tablets 90 tablet 3         10/08/2017

           Active



          500 mg tabletIndications: by mouth 2                                  

       



          Acquired hemolytic times daily.                                       

  



          anemia, Systemic lupus                                                

   



          erythematosus with                                                   



          glomerular disease,                                                   



          unspecified SLE type                                                  

 

 

                          predniSONE (DELTASONE) 20 Take 3 tablets by mouth alma

y Take 60mg (3 tablets) 

daily for 4 weeks 90 tablet    2            10/08/2017                Active



          mg tablet Take 50mg (2.5 tablets) daily for 2 weeks                   

                      



                    Then take 40mg (2 tablets) daily until seen in clinic.      

                                   

 

          ondansetron (ZOFRAN) 4 mg Take 1 tablet 20 tablet 0         10/08/2017

           Active



          tablet    by mouth every                                         



                    8 hours as                                         



                    needed for up                                         



                    to 5 doses for                                         



                    Nausea.                                           







Active Problems







                          Problem                   Noted Date

 

                          Thyroid lesion            10/01/2017

 

                          Lymphadenopathy, cervical 2017

 

                          Anemia                    2017

 

                          Systemic lupus erythematosus with glomerular disease 0

2017

 

                          Cold agglutinin disease   2017

 

                          Menorrhagia with regular cycle 

 

                          Uterine leiomyoma         

 

                          Acquired hemolytic anemia 

 

                          Current use of steroid medication 

 

                          High risk medication use  

 

                          Lupus nephritis           

 

                          Proteinuria               







Family History







                Medical History Relation        Name            Comments

 

                Heart failure   Father                          

 

                Diabetes        Maternal Grandfather                 

 

                Heart failure   Maternal Grandfather                 

 

                Stomach cancer  Maternal Grandmother                 

 

                Cerebral aneurysm Mother                          

 

                Heart failure   Mother                          

 

                Hyperthyroidism Mother                          









                Relation        Name            Status          Comments

 

                Father                                          

 

                Maternal Grandfather                                 

 

                Maternal Grandmother                                 

 

                Mother                                          







Social History







             Tobacco Use  Types        Packs/Day    Years Used   Date

 

             Never Smoker                                        









                Smokeless Tobacco: Never Used                                 









                Alcohol Use     Drinks/Week     oz/Week         Comments

 

                No                                              









                          Sex Assigned at Birth     Date Recorded

 

                          Not on file               







Last Filed Vital Signs

Not on file



Plan of Treatment







                Health Maintenance Due Date        Last Done       Comments

 

                Pap Cervical Cancer Scrn 2015                      

 

                IMM Influenza Seasonal Oct to March (>/= 19 yrs) 10/01/2020     

                 







Results

Not on fileafter 2020



Insurance







          Payer     Benefit Plan / Subscriber ID Effective Dates Phone     Addre

ss   Type



                    Group                                             

 

          [facility]      SELF-PAY  [id number] 2017-Prese [phone] [id number] 



           SELF-PAY   UNSCREENED            [address]          [place], [place] 



                                                            [id number]     









           Guarantor Name Account Type Relation to Date of    Phone      Billing



                                 Patient    Birth                 Address

 

           Jayde Mcduffie Personal/Family Self       1995 [phone]









             [address]                                             (Home)



                                        [place]



                                                       [phone]



                                                       (Work)     







Advance Directives







                Code Status     Date Activated  Date Inactivated Comments

 

                Full Code       2017 12:28 PM 10/8/2017  8:31 PM

## 2021-02-09 NOTE — XMS REPORT
Summary of Care

                           Created on:2021



Patient:Jayde Ibarra

Sex:Female

:1995

External Reference #:[id number]





Demographics







                          Address                   [id number]



                                                    [place] [address]

 

                          Home Phone                [phone]

 

                          Mobile Phone              [phone]

 

                          Email Address             [email or web address]

 

                          Preferred Language        English

 

                          Marital Status            

 

                          Synagogue Affiliation     Unknown

 

                          Race                      Black or 

 

                          Ethnic Group              Not  or 









Author







                          Organization              [facility]

 

                          Address                   [address] [place] [address]









Support







                Name            Relationship    Address         Phone

 

                Yeny Tay   Unavailable     [address] [phone]





                                                [place] [address] 









Care Team Providers







                    Name                Role                Phone

 

                    Pcp,  Does Not Have A Primary Care Provider [phone]









Reason for Visit







                          Reason                    Comments

 

                          Intake Referral           







Encounter Details







             Date         Type         Department   Care Team    Description

 

             2021   Telephone    [facility] Transplant- Whit Coreas Intake Referral



                                                [facility] PAULINO MD



                                        



                                                            Ctr



                                        [address] [facility] [id number]



                                        



                                                [address] [place] [address]



                                        



                                                            [address]



                                        [phone]



                                        



                                                            [place], [place] [address] [phone] (Fax)        



                                       [phone]              







Allergies







             Active Allergy Reactions    Severity     Noted Date   Comments

 

             Penicillins  Hives                     2018   



documented as of this encounter (statuses as of 2021)



Medications







          Medication Sig       Dispensed Refills   Start Date End Date  Status

 

          mycophenolate mofetil Take  by            0                           

  Active



          (CELLCEPT ORAL) mouth.                                            

 

          hydroxychloroquine Take  by            0                             A

ctive



          sulfate (PLAQUENIL ORAL) mouth.                                       

     

 

          Fe gluconate/vit C/folic Take  by            0                        

     Active



          acid (IRON-C ORAL) mouth.                                            

 

          sulfamethoxazole-trimetho Take 1 tablet 20 tablet 0         2018

           Active



          prim 800-160 mg per by mouth every                                    

     



          tablet    12 (twelve)                                         



                    hours.                                            

 

          benzonatate 100 mg Take 1 capsule 14 capsule 0         2018     

      Active



          capsule   by mouth 3                                         



                    (three) times                                         



                    daily as                                          



                    needed for                                         



                    Cough.                                            

 

          predniSONE 20 mg Take 1 tablet 5 tablet  0         2020         

  Active



          tabletIndications: by mouth                                          



          Paresthesia daily.                                            



documented as of this encounter (statuses as of 2021)



Active Problems

No known active problemsdocumented as of this encounter (statuses as of 
2021)



Social History







             Tobacco Use  Types        Packs/Day    Years Used   Date

 

             Never Assessed                                        









                          Sex Assigned at Birth     Date Recorded

 

                          Not on file               



documented as of this encounter



Last Filed Vital Signs

Not on filedocumented in this encounter



Miscellaneous Notes

Telephone Encounter - Linda Treviño - 2021 10:14 AM CSTSubmitted for 
Financial Clearance



Referral received via: Phone

How did the patient hear about our program: Internet



Which organ are you referring the patient for transplant or liver surgery? 
Kidney



Does the patient have HIV/AIDS: no

Does the patient have cancer: no

Does the patient have any potential living donors: yes

Is the patient listed for transplant at this time: no

Has the patient had a previous transplant (s): no

What caused the patients' disease? Diagnosis: Lupus

Does the patient have diabetes: No

Does the patient use assistive devices: No

Does the patient have any special needs? Oxygen? no



Patient Height: 4'11"

Patient Weight: 120 lbs



Referring MD: Thalia Oneal

Specialty: Nephrologist (Kidney)

Phone: [phone]

Fax: [phone]

Care Team Physician



Primary Care MD: Elsa QUINN Jr.

Phone: [phone]

Address: [address] [place] [address]

Fax: [phone]



Currently on dialysis: yes

What type of dialysis: Hemodialysis (HD)

Dialysis Center: [facility]

Dialysis Phone: [phone]

Dialysis Fax: [phone]



Insurance Information:



Medicare

[id number]



[employer]

Policy: [id number]

Phone: [phone]



[facility]

[id number] signed by Linda Treviño at 2021 10:31 AM CST
documented in this encounter



Plan of Treatment







                Health Maintenance Due Date        Last Done       Comments

 

                VARICELLA VACCINES (1 of 2 - 1995                      



                2-dose childhood series)                                 

 

                Depression Screening 2006                      

 

                DTaP,Tdap,and Td Vaccines (1 - 2013                      



                Tdap)                                           

 

                PAP SMEAR       2015                      

 

                INFLUENZA VACCINE (#1) 2020      

 

                PNEUMOCOCCAL 0-64 YEARS COMBINED Aged Out                       

 No longer eligible based on



                SERIES                                          patient's age to

 complete



                                                                this topic



documented as of this encounter



Results

Not on filedocumented in this encounter



Insurance







          Payer     Benefit Plan / Subscriber ID Effective Dates Phone     Addre

ss   Type



                    Group                                             

 

          [employer]ER[employer] [id number] 3/13/2020-Prese                     HM

O/PPO/PO



          NON-CONTRACT NON-CONTRACT           nt                            S



          GENERIC   GENERIC                                           



documented as of this encounter

## 2021-02-09 NOTE — XMS REPORT
Summary of Care

                           Created on:2021



Patient:Jayde Ibarra

Sex:Female

:1995

External Reference #:[id number]





Demographics







                          Address                   [id number]



                                                    [place] [address]

 

                          Home Phone                [phone]

 

                          Mobile Phone              [phone]

 

                          Email Address             [email or web address]

 

                          Preferred Language        English

 

                          Marital Status            

 

                          Restorationist Affiliation     Unknown

 

                          Race                      Black or 

 

                          Ethnic Group              Not  or 









Author







                          Organization              [facility]

 

                          Address                   [address] [place] [address]









Support







                Name            Relationship    Address         Phone

 

                Yeny Tay   Unavailable     [address] [phone]





                                                [place] [address] 









Care Team Providers







                    Name                Role                Phone

 

                    Pcp,  Does Not Have A Primary Care Provider [phone]









Encounter Details







             Date         Type         Department   Care Team    Description

 

             2021   Hospital Encounter [facility] Josh Arguelles, 



                                                            Registration



                                        MD



                                        



                                                [address] [place] [address]   [id number]



                                        



                                                [id number] [phone] (Fax) 







Allergies







             Active Allergy Reactions    Severity     Noted Date   Comments

 

             Penicillins  Hives                     2018   



documented as of this encounter (statuses as of 2021)



Medications







          Medication Sig       Dispensed Refills   Start Date End Date  Status

 

          mycophenolate mofetil Take  by            0                           

  Active



          (CELLCEPT ORAL) mouth.                                            

 

          hydroxychloroquine Take  by            0                             A

ctive



          sulfate (PLAQUENIL ORAL) mouth.                                       

     

 

          Fe gluconate/vit C/folic Take  by            0                        

     Active



          acid (IRON-C ORAL) mouth.                                            

 

          sulfamethoxazole-trimetho Take 1 tablet 20 tablet 0         2018

           Active



          prim 800-160 mg per by mouth every                                    

     



          tablet    12 (twelve)                                         



                    hours.                                            

 

          benzonatate 100 mg Take 1 capsule 14 capsule 0         2018     

      Active



          capsule   by mouth 3                                         



                    (three) times                                         



                    daily as                                          



                    needed for                                         



                    Cough.                                            

 

          predniSONE 20 mg Take 1 tablet 5 tablet  0         2020         

  Active



          tabletIndications: by mouth                                          



          Paresthesia daily.                                            



documented as of this encounter (statuses as of 2021)



Active Problems

No known active problemsdocumented as of this encounter (statuses as of 
2021)



Social History







             Tobacco Use  Types        Packs/Day    Years Used   Date

 

             Never Assessed                                        









                          Sex Assigned at Birth     Date Recorded

 

                          Not on file               









                    COVID-19 Exposure   Response            Date Recorded

 

                    In the last month, have you been in contact Unable to assess

    2021 11:09 AM 

CST



                    with someone who was confirmed or suspected                 

    



                    to have Coronavirus / COVID-19?                     



documented as of this encounter



Last Filed Vital Signs

Not on filedocumented in this encounter



Plan of Treatment







                Health Maintenance Due Date        Last Done       Comments

 

                VARICELLA VACCINES (1 of 2 - 1995                      



                2-dose childhood series)                                 

 

                Depression Screening 2006                      

 

                SARS-CoV-2 (COVID-19) Vaccine (1 2010                     

 



                of 2)                                           

 

                DTaP,Tdap,and Td Vaccines (1 - 2013                      



                Tdap)                                           

 

                PAP SMEAR       2015                      

 

                INFLUENZA VACCINE (#1) 2020      

 

                PNEUMOCOCCAL 0-64 YEARS COMBINED Aged Out                       

 No longer eligible based on



                SERIES                                          patient's age to

 complete



                                                                this topic



documented as of this encounter



Results

Not on filedocumented in this encounter



Insurance







          Payer     Benefit Plan / Subscriber ID Effective Dates Phone     Addre

ss   Type



                    Group                                             

 

          [employer] [id number]  2021-Present                  

   Medicare Adv



          SPRING    SPRING                                            HMO









           Guarantor Name Account Type Relation to Date of    Phone      Billing



                                 Patient    Birth                 Address

 

           Kaylah Ibarra Personal/Family Self       1995 [phone] 

[facility] [address]                                          (Home)



                                        [id number] [phone] [address]



                                                       (Work)     [id number]



documented as of this encounter

## 2021-02-09 NOTE — XMS REPORT
Continuity of Care Document

                           Created on:2021



Patient:NELLIE JOLLY

Sex:Female

:1995

External Reference #:[id number]





Demographics







                          Address                   [address] [address]



                                                    [place] [address]

 

                          Home Phone                [phone]

 

                          Work Phone                [phone]

 

                          Mobile Phone              [phone]

 

                          Email Address             [email or web address]

 

                          Preferred Language        English

 

                          Marital Status            Unknown

 

                          Pentecostalism Affiliation     Unknown

 

                          Race                      Unknown

 

                          Additional Race(s)        Unavailable



                                                    Unavailable



                                                    Black or 

 

                          Ethnic Group              Unknown









Author







                          Organization              [facility]

t

 

                          Address                   [address]



                                                    [place] [address]

 

                          Phone                     [phone]









Support







                Name            Relationship    Address         Phone

 

                Yeny Tay  Spouse          [phone]





                                                [place] [address] 

 

                GinettePatt   Other           [address] [address] [address] [phone]



                                                         



                                                [place] [address] 

 

                Patt Peng   Aunt            [address] [facility] [address]  [phone]



                                                [place] [address] 

 

                Marina Peng Relative        Unavailable     [phone]









Care Team Providers







                    Name                Role                Phone

 

                    Asked, No Pcp       Primary Care Physician Unavailable

 

                    Reece                Attending Clinician Unavailable

 

                    Kaya MURRAY,  P     Attending Clinician Unavailable

 

                    [place]          Attending Clinician Unavailable

 

                    Joss MILES,  K N Attending Clinician [phone]

 

                    Jing CALIXTO          Attending Clinician Unavailable

 

                    Kobi               Attending Clinician Unavailable

 

                    Lena              Attending Clinician Unavailable

 

                    Harpreet               Attending Clinician Unavailable









Payers







           Payer Name Policy Type Policy     Effective Date Expiration Date Sour

ce



                                 Number                           

 

           MEDICAREMEDICARE A            [id number] 2020-10-01            [facility]



           [id number]                       00:00:00              - M

[facility]

 

           CVCPCVCP              [id number] 2016            [facility]



           [id number]          00:00:00              Met

hodist



           [id number], [address]                                             



           [address] [place] [address]                                             



           [id number]                                               







Problems







       Condition Condition Condition Status Onset  Resolution Last   Treating Co

mments 

Source



       Name   Details Category        Date   Date   Treatment Clinician        



                                                 Date                 

 

       Thyroid Thyroid Disease Active 2017                             [facility]



       lesion lesion               0-01                               Health



                                   00:00:                             



                                   00                                 

 

       Lymphadeno Lymphadeno Disease Active                              H

pham



       bharat, bharat,                                              Health



       cervical cervical               00:00:                             



                                   00                                 

 

       Anemia Anemia Disease Active                              [facility]



                                                                  Health



                                   00:00:                             



                                   00                                 

 

       Systemic Systemic Disease Active                              [facility]



       lupus  lupus                                               Health



       erythemato erythemato               00:00:                             



       blanche with blanche with               00                                 



       glomerular glomerular                                                  



       disease disease                                                  

 

       Cold   Cold   Disease Active                              [facility]



       agglutinin agglutinin                                              He

alth



       disease disease               00:00:                             



                                   00                                 

 

       Menorrhagi Menorrhagi Disease Active                                    H

arris



       a with a with                                                  Health



       regular regular                                                  



       cycle  cycle                                                   

 

       Uterine Uterine Disease Active                                    [facility]



       leiomyoma leiomyoma                                                  [facility]

th

 

       Acquired Acquired Disease Active                                    [facility]



       hemolytic hemolytic                                                  [facility]

th



       anemia anemia                                                  

 

       Current Current Disease Active                                    [facility]



       use of use of                                                  Health



       steroid steroid                                                  



       medication medication                                                  

 

       High risk High risk Disease Active                                    [facility]

ris



       medication medication                                                  He

alth



       use    use                                                     

 

       Lupus  Lupus  Disease Active                                    [facility]



       nephritis nephritis                                                  [facility]

th

 

       Proteinuri Proteinuri Disease Active                                    H

arris



       a      a                                                       Health







Allergies, Adverse Reactions, Alerts







       Allergy Allergy Status Severity Reaction(s) Onset  Inactive Treating Comm

ents 

Source



       Name   Type                        Date   Date   Clinician        

 

       Penicill Propensi Active               2020                      [facility]



       ins    ty to                       0-23                        [facility]



              adverse                      00:00:                      [facility]



              reaction                      00                          Center



              s                                                       

 

       Penicill Propensi Active        Shortness Of                       

[facility]



       ins    ty to                Breath 731                        [facility]



              adverse                      00:00:                      st



              reaction                      00                          



              s to                                                    



              drug                                                    

 

       Amoxicil Propensi Active                                     [place]



       hansa-Pot ty to                       808                        [facility]



       Clavulan adverse                      00:00:                      



       ate    reaction                      00                          



              s to                                                    



              drug                                                    

 

       Penicill Propensi Active                                     [facility]



       ins    ty to                       808                        Health



              adverse                      00:00:                      



              reaction                      00                          



              s to                                                    



              drug                                                    







Family History







           Family Member Diagnosis  Comments   Start Date Stop Date  Source

 

           Natural mother Heart failure                                  [facility]

 

           [facility]

 

           Natural mother Cerebral aneurysm                                  [facility]

 

           Natural father Heart failure                                  [facility]

 

           Maternal grandfather Diabetes                                    [facility]

is [facility]

 

           Maternal grandfather Heart failure                                  H

[facility]

 

           Maternal grandmother Stomach cancer                                  

[facility]







Social History







           Social Habit Start Date Stop Date  Quantity   Comments   Source

 

           Sex Assigned At                                             [facility]



           Birth                                                  [facility]

 

           Tobacco use and 2018 Never used            [facility]



           exposure   00:00:00   00:00:00                         [facility]

 

           Alcohol intake 2018 Current drinker of            Javi brink



                      00:00:00   00:00:00   alcohol (finding)            [facility]

st

 

           Alcohol Comment 2018 Occasionally            [facility]



                      00:00:00   00:00:00                         [facility]









                Smoking Status  Start Date      Stop Date       Source

 

                Never smoker                                    [facility]







Medications







       Ordered Filled Start  Stop   Current Ordering Indication Dosage Frequency

 Signature

                    Comments            Components          Source



     Medication Medication Date Date Medication? Clinician                (SIG) 

          



     Name Name                                                   

 

     hydroxychlo      2017      Yes            200mg QD   Take 200           H

arris



     roquine      0-08                               mg by           [facility]



     (PLAQUENIL)      18:25:                               mouth           



     200 mg      57                                 daily.           



     tablet                                                        

 

     ferrous      2017      Yes            325mg QD   Take 325           [facility]

s



     sulfate 325      0-08                               mg by           [facility]



     mg (65 mg      18:25:                               mouth           



     iron)      57                                 daily           



     tablet                                         (with           



                                                  breakfast)           



                                                  .              

 

     aspirin      2017      Yes            81mg QD   Chew and           [facility]



     (ASPIRIN)      0-08                               swallow 81           [facility]

th



     81 mg      18:25:                               mg by           



     chewable      57                                 mouth           



     tablet                                         daily.           

 

     sertraline      2017      Yes            50mg QD   Take 50 mg           H

arris



     (ZOLOFT) 50      0-08                               by mouth           [facility]

th



     mg tablet      18:25:                               daily.           



               57                                                

 

     atovaquone      2017      Yes            1500mg QD   Take 10 mL          

 [facility]



     (MEPRON)      0-08                               by mouth           [facility]



     750 mg/5 mL      00:00:                               daily.           



     oral      00                                                



     suspension                                                        

 

     folic acid      2017      Yes            1mg  QD   Take 1           [facility]

s



     (FOLVITE) 1      0-08                               tablet by           [facility]

lth



     mg tablet      00:00:                               mouth           



               00                                 daily.           

 

     sennosides-      2017      Yes            1{tbl} QD   Take 1           [facility]

rris



     docusate      0-08                               tablet by           Health



     sodium      00:00:                               mouth           



     (SENNA      00                                 daily.           



     PLUS)                                                        



     8.6-50 mg                                                        



     tablet                                                        

 

     hydroxychlo      2017      Yes            200mg QD   Take 1           [facility]

ris



     roquine      0-08                               tablet by           [facility]



     (PLAQUENIL)      00:00:                               mouth           



     200 mg      00                                 daily.           



     tablet                                                        

 

     omeprazole      2017      Yes            40mg QD   Take 2           [facility]

s



     (PRILOSEC)      0-08                               capsules           Healt

h



     20 mg      00:00:                               by mouth           



     delayed      00                                 daily.           



     release                                                        



     capsule                                                        

 

     mycophenola      2017      Yes       Systemic 1500mg Q.5D Take 3         

  [facility]



     te        0-08                lupus           tablets by           [facility]



     (CELLCEPT)      00:00:                erythematos           mouth 2        

   



     500 mg      00                  us with           times           



     tablet                          glomerular           daily.           



                                   disease,                          



                                   unspecified                          



                                   SLE type                          

 

     predniSONE      2017      Yes            60mg QD   Take 3           [facility]

s



     (DELTASONE)      0-08                               tablets by           He

alth



     20 mg      00:00:                               mouth           



     tablet      00                                 daily Take           



                                                  60mg (3           



                                                  tablets)           



                                                  daily for           



                                                  4              



                                                  weeksTake           



                                                  50mg (2.5           



                                                  tablets)           



                                                  daily for           



                                                  2              



                                                  weeksThen           



                                                  take 40mg           



                                                  (2             



                                                  tablets)           



                                                  daily           



                                                  until seen           



                                                  in clinic.           

 

     ondansetron      -      Yes            4mg       Take 1           [facility]

is



     (ZOFRAN) 4      0-08                               tablet by           [facility]

th



     mg tablet      00:00:                               mouth           



               00                                 every 8           



                                                  hours as           



                                                  needed for           



                                                  up to 5           



                                                  doses for           



                                                  Nausea.           







Vital Signs







             Vital Name   Observation Time Observation Value Comments     Source

 

             Body height  2020-10-23 09:40:00 149.9 cm                  [facility]

 

             Body weight  2020-10-23 09:40:00 56.7 kg                   [facility]

 

             BMI          2020-10-23 09:40:00 25.25 kg/m2               [facility]







Procedures







                Procedure       Date / Time Performed Performing Clinician Sour

e

 

                SARS-COV2/RT-PCR ([facility] 2020 19:00:00                 [facility]

[facility]



                [facility])                                     [facility]







Plan of Care







             Planned Activity Planned Date Details      Comments     Source

 

             Future Scheduled 2020-10-01   IMM Influenza              Benito a

lt



             Test         00:00:00     Seasonal Oct to              



                                       March (>/= 19 yrs)              



                                       [code = IMM               



                                       Influenza Seasonal              



                                       Oct to March (>/= 19              



                                       yrs)]                     

 

             Future Scheduled 2020   INFLUENZA VACCINE              [facility]

n [facility]



             Test         00:00:00     [code = INFLUENZA              



                                       VACCINE]                  

 

             Future Scheduled 2015   Screening for              [facility] Me

[facility]



             Test         00:00:00     malignant neoplasm              



                                       of cervix                 



                                       (procedure) [code =              



                                       580899554]                

 

             Future Scheduled 2015   Screening for              Benito Finn

lt



             Test         00:00:00     malignant neoplasm              



                                       of cervix                 



                                       (procedure) [code =              



                                       671471346]                

 

             Future Scheduled 2012   Hepatitis C               [facility]

[facility]



             Test         00:00:00     screening                 



                                       (procedure) [code =              



                                       080355766]                

 

             Future Scheduled 2010   COVID-19 VACCINE (1              [facility]

ton [facility]



             Test         00:00:00     of 2) [code =              



                                       COVID-19 VACCINE (1              



                                       of 2)]                    







Encounters







        Start   End     Encounter Admission Attending Care    Care    Encounter 

Source



        Date/Time Date/Time Type    Type    Clinicians Facility Department ID   

   

 

        2021 Telephone         [facility]    [phone] 

[id number] 



        00:00:00 00:00:00                 Chiqui martin MITRA LORA [facility] [phone]     

    



                                                [facility]   [id number]         



                                                [place]  [phone]         



                                                AND EMERALD Chew             



                                                DIABETES                 



                                                CLINIC                  

 

        2017-11-10 2017-11-10 Outpatient                 [facility]     [id number]

72 [place]



        00:00:00 00:00:00                                                 Health

 

        2017-10-03 2017-10-03 Outpatient                 [facility]     [id number]

78 [place]



        07:37:31 07:37:31                                                 Health

 

        2017-10-01 2017-10-01 Outpatient                 [facility]     [id number]

67 [place]



        07:21:26 07:21:26                                                 Health

 

        2017 Outpatient                 [facility]     [id number]

80 [facility]



        15:25:08 15:25:08                                                 Health

 

        2017 Outpatient                 [facility]     [id number]

51 [facility]



        10:38:27 10:38:27                                                 Health

 

        2017 Emergency                 [facility]     [id number]

2 [facility]



        04:19:42 04:19:42                                                 Health

 

        2017 [facility]     [id number]

0 [facility]



        00:52:05 00:52:05                                                 Health

 

        2017 Emergency                 [facility]     [id number]

4 [facility]



        18:41:35 18:41:35                                                 Health







Results







           Test Description Test Time  Test Comments Results    Result Comments 

Source









                    SARS-CoV2/RT-PCR ([facility]) 2020 04:00:00 









                      Test Item  Value      Reference Range Interpretation Comme

nts









             SARS-COV2/RT-PCR (test code = Negative     Not Detected,           

   



             26385-9)                  Negative, See external              



                                       report for linked test              

 

             SARS-COV-2 PERFORMING LAB [facility]                                  



             (test code = 68994-3)                                        

 

             LIANA (test code = LIANA) Negative results do not                      

     



                          preclude SARS-CoV-2 infection                         

  



                          and should not be used as the                         

  



                          sole basis for patient                           



                          management decisions.                           



                          Negative results must be                           



                          combined with clinical                           



                          observations, patient                           



                          history, and epidemiological                          

 



                          information. A false negative                         

  



                          result may occur if a                           



                          specimen is improperly                           



                          collected, transported or                           



                          handled. The limit of                           



                          detection for this assay is                           



                          250 copies/mL. This SARS                           



                          CoV-2 test is a rapid,                           



                          real-time RT-PCR test                           



                          intended for the qualitative                          

 



                          detection of nucleic acid                           



                          from SARS-CoV-2 in a                           



                          nasopharyngeal swab specimen                          

 



                          collected from individuals                           



                          suspected of COVID-19 by                           



                          their healthcare provider.                           



                          This test has not been Food                           



                          and Drug Administration (FDA)                         

  



                          cleared or approved and has                           



                          been authorized by FDA under                          

 



                          an Emergency Use                           



                          Authorization (EUA). This EUA                         

  



                          will be effective until the                           



                          declaration that                           



                          circumstances exist                           



                          justifying the authorization                          

 



                          of the emergency use of in                           



                          vitro diagnostic tests for                           



                          detection and/or diagnosis of                         

  



                          COVID-19 is terminated under                          

 



                          Section 564(b)(2) of the Act                          

 



                          or the EUA is revoked under                           



                          Section 564(g) of the Act.                           



                          Fact Sheet for Healthcare                           



                          Providers:https://www.3TIER/Documents/Xpert%20Xpress                         

  



                          %20SARS%20CoV-2/Fact%20Sheets                         

  



                          /302-3802%93COOW-XFG-9%20HEAL                         

  



                          THCARE%20PROVIDERS%20FACT%20S                         

  



                          HEET.pdf Fact Sheet for                           



                          Healthcare                             



                          Patients:https://www.cepheid.                         

  



                          com/Documents/Xpert%20Xpress%                         

  



                          20SARS%20CoV-2/Fact%20Sheets/                         

  



                          302-3801%86NDPQ-WYI-5%20PATIE                         

  



                          NT%20FACT%20SHEET.pdf                           



                          Performing Laboratory:[facility]                         

  



                          [facility] [place] [address]                         

  



[facility]2020 
04:00:00





             Test Item    Value        Reference Range Interpretation Comments

 

             SARS-COV2/RT-PCR (test code Negative     Not Detected, Negative,   

           



             = [id number])                See external report for              



                                       linked test               

 

             SARS-COV-2 PERFORMING LAB [facility]                                  



             (test code = 4362374)                                        



Negative results do not preclude SARS-CoV-2 infection and should not be used as 
the sole basis for patient management decisions. Negative results must be 
combined with clinical observations, patient history, and epidemiological 
information. A false negative result may occur if a specimen is improperly
collected, transported or handled.The limit of detection for this assay is 250 
copies/mL.This SARS CoV-2 test is a rapid, real-time RT-PCR test intended for 
the qualitative detection of nucleic acid from SARS-CoV-2 in a nasopharyngeal 
swab specimen collected from individuals suspected of COVID-19 by their 
healthcare provider.This test has not been Food and Drug Administration (FDA) 
cleared or approved and has been authorized by FDA under an Emergency Use 
Authorization (EUA). This EUA will be effective until the declaration that 
circumstances exist justifying the authorization of the emergency use of in 
vitro diagnostic tests for detection and/or diagnosis of COVID-19 is terminated 
under Section 564(b)(2) of the Act or the EUA is revoked under Section 564(g) of
the Act.Fact Sheet for Healthcare Pro
viders:https://www.Cody/Documents/Xpert%20Xpress%20SARS%20CoV-2/Fact%20Sh

eets/3023802%90NXCL-CPM-3%20HEALTHCARE%20PROVIDERS%20FACT%20SHEET.pdfFact Sheet
for Healthcare Patients:https://www.Discomixdownload.com/Documents/Xpert%20Xpress%20SARS%20CoV-2/Fact%20Sheets/3023801%20SARS-COV

-2%20PATIENT%20FACT%20SHEET.pdfPerforming Laboratory:[facility] [place] [address]

## 2021-02-09 NOTE — XMS REPORT
Summary of Care

                           Created on:2021



Patient:Jayde Ibarra

Sex:Female

:1995

External Reference #:[id number]





Demographics







                          Address                   [id number]



                                                    [place] [address]

 

                          Home Phone                [phone]

 

                          Mobile Phone              [phone]

 

                          Email Address             [email or web address]

 

                          Preferred Language        English

 

                          Marital Status            

 

                          Mosque Affiliation     Unknown

 

                          Race                      Black or 

 

                          Ethnic Group              Not  or 









Author







                          Organization              [facility]

 

                          Address                   [address] [place] [address]









Support







                Name            Relationship    Address         Phone

 

                Yeny Tay   Unavailable     [address] [phone]





                                                [place] [address] 









Care Team Providers







                    Name                Role                Phone

 

                    Pcp,  Does Not Have A Primary Care Provider [phone]









Reason for Visit







                          Reason                    Comments

 

                          Intake Referral           







Encounter Details







             Date         Type         Department   Care Team    Description

 

             2021   Telephone    [facility] Transplant- Whit Coreas Intake Referral



                                                [facility] MITRA PAULINO MD



                                        



                                                            Ctr



                                        [address] [facility] [id number]



                                        



                                                [address] [place] [address]



                                        



                                                            [address]tr[address]



                                        [phone]



                                        



                                                            [place], [place] [address] [phone] (Fax)        



                                       [phone]              







Allergies







             Active Allergy Reactions    Severity     Noted Date   Comments

 

             Penicillins  Hives                     2018   



documented as of this encounter (statuses as of 2021)



Medications







          Medication Sig       Dispensed Refills   Start Date End Date  Status

 

          mycophenolate mofetil Take  by            0                           

  Active



          (CELLCEPT ORAL) mouth.                                            

 

          hydroxychloroquine Take  by            0                             A

ctive



          sulfate (PLAQUENIL ORAL) mouth.                                       

     

 

          Fe gluconate/vit C/folic Take  by            0                        

     Active



          acid (IRON-C ORAL) mouth.                                            

 

          sulfamethoxazole-trimetho Take 1 tablet 20 tablet 0         2018

           Active



          prim 800-160 mg per by mouth every                                    

     



          tablet    12 (twelve)                                         



                    hours.                                            

 

          benzonatate 100 mg Take 1 capsule 14 capsule 0         2018     

      Active



          capsule   by mouth 3                                         



                    (three) times                                         



                    daily as                                          



                    needed for                                         



                    Cough.                                            

 

          predniSONE 20 mg Take 1 tablet 5 tablet  0         2020         

  Active



          tabletIndications: by mouth                                          



          Paresthesia daily.                                            



documented as of this encounter (statuses as of 2021)



Active Problems

No known active problemsdocumented as of this encounter (statuses as of 
2021)



Social History







             Tobacco Use  Types        Packs/Day    Years Used   Date

 

             Never Assessed                                        









                          Sex Assigned at Birth     Date Recorded

 

                          Not on file               









                    COVID-19 Exposure   Response            Date Recorded

 

                    In the last month, have you been in contact Unable to assess

    2021 11:09 AM 

CST



                    with someone who was confirmed or suspected                 

    



                    to have Coronavirus / COVID-19?                     



documented as of this encounter



Last Filed Vital Signs

Not on filedocumented in this encounter



Miscellaneous Notes

Telephone Encounter - Linda Treviño - 2021  4:16 PM CSTNOT FINANCIALLY
CLEARED//NON-CONTRACTED//REDIRECTED/LETTER SENTElectronically signed by Kamila 
Linda A at 2021  4:17 PM CSTTelephone Encounter - Linda Treviño - 
2021 10:14 AM CSTSubmitted for Financial Clearance



Referral received via: Phone

How did the patient hear about our program: Internet



Which organ are you referring the patient for transplant or liver surgery? 
Kidney



Does the patient have HIV/AIDS: no

Does the patient have cancer: no

Does the patient have any potential living donors: yes

Is the patient listed for transplant at this time: no

Has the patient had a previous transplant (s): no

What caused the patients' disease? Diagnosis: Lupus

Does the patient have diabetes: No

Does the patient use assistive devices: No

Does the patient have any special needs? Oxygen? no



Patient Height: 4'11"

Patient Weight: 120 lbs



Referring MD: Thalia Oneal

Specialty: Nephrologist (Kidney)

Phone: [phone]

Fax: [phone]

Care Team Physician



Primary Care MD: Elsa QUINN Jr.

Phone: [phone]

Address: [address] [place] [address]

Fax: [phone]



Currently on dialysis: yes

What type of dialysis: Hemodialysis (HD)

Dialysis Center: [facility]

Dialysis Phone: [phone]

Dialysis Fax: [phone]



Insurance Information:



Medicare

[id number]



[facility]

Policy: [id number]

Phone: [phone]



[facility]

[id number] signed by Linda Treviño at 2021 10:31 AM CST
documented in this encounter



Plan of Treatment







                Health Maintenance Due Date        Last Done       Comments

 

                VARICELLA VACCINES (1 of 2 - 1995                      



                2-dose childhood series)                                 

 

                Depression Screening 2006                      

 

                DTaP,Tdap,and Td Vaccines (1 - 2013                      



                Tdap)                                           

 

                PAP SMEAR       2015                      

 

                INFLUENZA VACCINE (#1) 2020      

 

                PNEUMOCOCCAL 0-64 YEARS COMBINED Aged Out                       

 No longer eligible based on



                SERIES                                          patient's age to

 complete



                                                                this topic



documented as of this encounter



Results

Not on filedocumented in this encounter



Insurance







          Payer     Benefit Plan / Subscriber ID Effective Phone     Address   T

ype



                    Group               Dates                         

 

          [employer] [id number] 3/13/2020-Pres                     HMO

/PPO/POS



          NON-CONTRACT NON-CONTRACT           ent                           



          GENERIC   GENERIC                                           

 

          [employer] [id number]  2021-Prese                    

 Medicare Adv



          SPRING    SPRING              nt                            HMO



documented as of this encounter

## 2021-02-09 NOTE — XMS REPORT
Summary of Care

                           Created on:2021



Patient:Jayde Ibarra

Sex:Female

:1995

External Reference #:[id number]





Demographics







                          Address                   [id number]



                                                    [place] [address]

 

                          Home Phone                [phone]

 

                          Mobile Phone              [phone]

 

                          Email Address             [email or web address]

 

                          Preferred Language        English

 

                          Marital Status            

 

                          Sikh Affiliation     Unknown

 

                          Race                      Black or 

 

                          Ethnic Group              Not  or 









Author







                          Organization              [facility]

 

                          Address                   [address] [place] [address]









Support







                Name            Relationship    Address         Phone

 

                Yeny Tay   Unavailable     [address] [phone] 



                                                [place] [address] 









Care Team Providers







                    Name                Role                Phone

 

                    Pcp,  Does Not Have A Primary Care Provider [phone]









Encounter Details







             Date         Type         Department   Care Team    Description

 

             2021   Letter (Out) [facility] Transplant- Tonya Arguelles 



                                                [facility]



                                        



                                                            Ctr



                                        [address] [facility]



                                        



                                                [address] [place] [address]



                                        



                                                            [address]



                                        [phone]



                                        



                                                            [place], [place] [address]

[phone] (Fax)        



                                       [phone]              







Allergies







             Active Allergy Reactions    Severity     Noted Date   Comments

 

             Penicillins  Hives                     2018   



documented as of this encounter (statuses as of 2021)



Medications







          Medication Sig       Dispensed Refills   Start Date End Date  Status

 

          mycophenolate mofetil Take  by            0                           

  Active



          (CELLCEPT ORAL) mouth.                                            

 

          hydroxychloroquine Take  by            0                             A

ctive



          sulfate (PLAQUENIL ORAL) mouth.                                       

     

 

          Fe gluconate/vit C/folic Take  by            0                        

     Active



          acid (IRON-C ORAL) mouth.                                            

 

          sulfamethoxazole-trimetho Take 1 tablet 20 tablet 0         2018

           Active



          prim 800-160 mg per by mouth every                                    

     



          tablet    12 (twelve)                                         



                    hours.                                            

 

          benzonatate 100 mg Take 1 capsule 14 capsule 0         2018     

      Active



          capsule   by mouth 3                                         



                    (three) times                                         



                    daily as                                          



                    needed for                                         



                    Cough.                                            

 

          predniSONE 20 mg Take 1 tablet 5 tablet  0         2020         

  Active



          tabletIndications: by mouth                                          



          Paresthesia daily.                                            



documented as of this encounter (statuses as of 2021)



Active Problems

No known active problemsdocumented as of this encounter (statuses as of 
2021)



Social History







             Tobacco Use  Types        Packs/Day    Years Used   Date

 

             Never Assessed                                        









                          Sex Assigned at Birth     Date Recorded

 

                          Not on file               









                    COVID-19 Exposure   Response            Date Recorded

 

                    In the last month, have you been in contact Unable to assess

    2021 11:09 AM 

CST



                    with someone who was confirmed or suspected                 

    



                    to have Coronavirus / COVID-19?                     



documented as of this encounter



Last Filed Vital Signs

Not on filedocumented in this encounter



Plan of Treatment







                Health Maintenance Due Date        Last Done       Comments

 

                VARICELLA VACCINES (1 of 2 - 1995                      



                2-dose childhood series)                                 

 

                Depression Screening 2006                      

 

                SARS-CoV-2 (COVID-19) Vaccine (1 2010                     

 



                of 2)                                           

 

                DTaP,Tdap,and Td Vaccines (1 - 2013                      



                Tdap)                                           

 

                PAP SMEAR       2015                      

 

                INFLUENZA VACCINE (#1) 2020      

 

                PNEUMOCOCCAL 0-64 YEARS COMBINED Aged Out                       

 No longer eligible based on



                SERIES                                          patient's age to

 complete



                                                                this topic



documented as of this encounter



Results

Not on filedocumented in this encounter



Insurance







          Payer     Benefit Plan / Subscriber ID Effective Phone     Address   T

ype



                    Group               Dates                         

 

          COMMERCIAL COMMERCIAL [id number] 3/13/2020-Pres                     HMO

/PPO/POS



          NON-CONTRACT NON-CONTRACT           ent                           



          GENERIC   GENERIC                                           

 

          [employer] [id number]  2021-Prese                    

 Medicare Adv



          SPRING    SPRING              nt                            HMO



documented as of this encounter

## 2024-09-27 NOTE — PN
Date of Progress Note:  08/01/2020



Chief Complaint:  End-stage renal disease, on dialysis.



Subjective:  The patient recently was initiated on dialysis for severe hyperazotemia and end-stage re
nal disease.  Due to longstanding history of lupus, the patient had a renal biopsy done at [facility] at least 7 years ago and it was lupus nephritis.  The patient is a 25-year-old female, 
who recently was discharged from hospital after she was diagnosed with end-stage renal disease and Ne
urology was following patient for small blood vessel disease.  On recent workup, changes are progress
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  The patient was to have LP, although procedure was po
stponed because of low platelet. 



Today, the patient is in her baseline mental status.  She denies confusion.  She denies headache or v
ision changes.



Physical Examination:

Lungs:  Diminished breath sounds in bases. 

Heart:  S1, S2. 

Abdomen:  Soft, benign. 

Extremities:  Minimal edema.  No oozing.  No skin rash.



Laboratory Data:  Hemoglobin 9.2, WBC 5.7, platelet count 79,000.  Chemistry showed sodium 144, potas
sium 4.1, chloride 110, CO2 24, BUN 60, creatinine 5.46, calcium 8.9, albumin 2.9, total protein 7.2.




Impression And Plan:  

1.End-stage renal disease.  The patient will resume dialysis today and procedure parameters is adjus
klarissa in view of recent altered mental status.  The patient is feeling better today.  The patient will 
continue medication as started by neurologist with steroids and Plaquenil as well as CellCept for lup
us.

2.Hypertension.  Blood pressure is in acceptable control.  Monitor blood pressure during dialysis.  
Adjust ultrafiltration according to blood pressure and titrate ultrafiltration down to prevent intrad
ialytic hypotension.

3.Anemia, multifactorial.  Hemoglobin is 9.2.  Continue ELMER for anemia due to chronic kidney.

4.Screen showed human immunodeficiency virus reactive test.  The patient will be evaluated by Infect
ious Disease.





KEN/MODL

DD:  08/01/2020 21:29:59Voice ID:  [id number]

DT:  08/01/2020 23:28:53Report ID:  [id number] Please review. Protocol Failed; No Protocol      Recent fills: 6/28/2024, 7/29/2024, 8/28/2024  Last Rx written: 8/28/2024  Last office visit: 6/6/2024      Future Appointments  Date Type Provider Dept   11/14/24 Appointment Sun Vega MD [facility]   Showing future appointments within next 150 days with a meds authorizing provider and meeting all other requirements        Requested Prescriptions   Pending Prescriptions Disp Refills    LORazepam 1 MG Oral Tab 30 tablet 0     Sig: Take 1 tablet (1 mg total) by mouth nightly as needed.       Controlled Substance Medication Failed - 9/22/2024  5:46 PM        Failed - This medication is a controlled substance - forward to provider to refill               Future Appointments         Provider Department Appt Notes    In 1 month Sun Vega MD [facility] follow up blood work.    In 1 month Denny Stanley MD [facility] Left knee pain, possible surgery    In 2 months Zoe Black MD [facility] 5 months          Recent Outpatient Visits              2 months ago Type 2 diabetes mellitus with hyperglycemia, without long-term current use of insulin ([facility])    [facility] Zoe Black MD    Office Visit    2 months ago Primary osteoarthritis of left knee    [facility] Denny Stanley MD    Office Visit    3 months ago Encounter for annual health examination    [facility] Sun Vega MD    Office Visit    6 months ago Acute cough    [facility] Sun Vega MD    Office Visit    6 months ago Type 2 diabetes mellitus with hyperglycemia, without long-term current use of insulin ([facility])    [facility]  Zoe Black MD    Office Visit